# Patient Record
Sex: MALE | Race: WHITE | Employment: UNEMPLOYED | URBAN - METROPOLITAN AREA
[De-identification: names, ages, dates, MRNs, and addresses within clinical notes are randomized per-mention and may not be internally consistent; named-entity substitution may affect disease eponyms.]

---

## 2017-02-16 RX ORDER — METOPROLOL TARTRATE 50 MG/1
TABLET, FILM COATED ORAL
Qty: 180 TABLET | Refills: 0 | Status: SHIPPED | OUTPATIENT
Start: 2017-02-16 | End: 2017-07-10

## 2017-07-11 RX ORDER — METOPROLOL TARTRATE 50 MG/1
TABLET, FILM COATED ORAL
Qty: 180 TABLET | Refills: 0 | Status: SHIPPED | OUTPATIENT
Start: 2017-07-11 | End: 2017-10-30

## 2017-10-30 ENCOUNTER — OFFICE VISIT (OUTPATIENT)
Dept: INTERNAL MEDICINE CLINIC | Facility: CLINIC | Age: 57
End: 2017-10-30

## 2017-10-30 VITALS
SYSTOLIC BLOOD PRESSURE: 172 MMHG | HEART RATE: 80 BPM | OXYGEN SATURATION: 97 % | TEMPERATURE: 99 F | BODY MASS INDEX: 33.27 KG/M2 | HEIGHT: 66 IN | DIASTOLIC BLOOD PRESSURE: 100 MMHG | WEIGHT: 207 LBS | RESPIRATION RATE: 20 BRPM

## 2017-10-30 DIAGNOSIS — R05.9 COUGH: Primary | ICD-10-CM

## 2017-10-30 PROCEDURE — 99212 OFFICE O/P EST SF 10 MIN: CPT | Performed by: INTERNAL MEDICINE

## 2017-10-30 RX ORDER — QUETIAPINE 25 MG/1
25 TABLET, FILM COATED ORAL 2 TIMES DAILY
COMMUNITY
End: 2021-02-10

## 2017-10-30 RX ORDER — SULFAMETHOXAZOLE AND TRIMETHOPRIM 800; 160 MG/1; MG/1
1 TABLET ORAL 2 TIMES DAILY
Qty: 20 TABLET | Refills: 0 | Status: SHIPPED | OUTPATIENT
Start: 2017-10-30 | End: 2017-11-09

## 2017-10-30 RX ORDER — METOPROLOL TARTRATE 100 MG/1
100 TABLET ORAL 2 TIMES DAILY
Qty: 60 TABLET | Refills: 1 | Status: SHIPPED | OUTPATIENT
Start: 2017-10-30 | End: 2017-12-29

## 2017-10-30 RX ORDER — CODEINE PHOSPHATE AND GUAIFENESIN 10; 100 MG/5ML; MG/5ML
5 SOLUTION ORAL EVERY 6 HOURS PRN
Qty: 240 ML | Refills: 0 | Status: SHIPPED | OUTPATIENT
Start: 2017-10-30 | End: 2017-11-09

## 2017-10-30 NOTE — PROGRESS NOTES
Tracy Roach  10/22/1960 is a 65year old male who presents for upper respiratory symptoms    Patient presents with:  URI        HPI:   Pt reports  respiratory symptoms for  Few days   .        Current Outpatient Prescriptions:  QUEtiapine Fumarate auscultation. air entry equal.no chest wall tenderness. wheeze trace  CARDIO: RRR without murmur  GI: good BS's,no masses, HSM or tenderness    ASSESSMENT AND PLAN:   Rubi Smart was seen today for uri.     Diagnoses and all orders for this visit:    Cough  -

## 2018-02-14 RX ORDER — METOPROLOL TARTRATE 100 MG/1
TABLET ORAL
Qty: 60 TABLET | Refills: 1 | Status: SHIPPED | OUTPATIENT
Start: 2018-02-14 | End: 2018-06-18

## 2018-06-20 RX ORDER — METOPROLOL TARTRATE 100 MG/1
TABLET ORAL
Qty: 60 TABLET | Refills: 0 | Status: SHIPPED | OUTPATIENT
Start: 2018-06-20 | End: 2018-06-25

## 2018-06-25 ENCOUNTER — OFFICE VISIT (OUTPATIENT)
Dept: INTERNAL MEDICINE CLINIC | Facility: CLINIC | Age: 58
End: 2018-06-25

## 2018-06-25 VITALS
RESPIRATION RATE: 16 BRPM | SYSTOLIC BLOOD PRESSURE: 140 MMHG | WEIGHT: 246 LBS | TEMPERATURE: 98 F | BODY MASS INDEX: 40 KG/M2 | HEART RATE: 118 BPM | OXYGEN SATURATION: 97 % | DIASTOLIC BLOOD PRESSURE: 90 MMHG

## 2018-06-25 DIAGNOSIS — I10 ESSENTIAL HYPERTENSION, BENIGN: Primary | ICD-10-CM

## 2018-06-25 DIAGNOSIS — Z00.00 LABORATORY EXAMINATION ORDERED AS PART OF A ROUTINE GENERAL MEDICAL EXAMINATION: ICD-10-CM

## 2018-06-25 PROCEDURE — 99213 OFFICE O/P EST LOW 20 MIN: CPT | Performed by: INTERNAL MEDICINE

## 2018-06-25 RX ORDER — METOPROLOL TARTRATE 100 MG/1
TABLET ORAL
Qty: 180 TABLET | Refills: 0 | Status: SHIPPED | OUTPATIENT
Start: 2018-06-25 | End: 2019-02-19

## 2018-06-25 NOTE — PROGRESS NOTES
Angelia Smith  10/22/1960 is a 65year old male. Patient presents with:   Follow - Up       HPI:   BP meds for 2 weeks could not get a refill    Current Outpatient Prescriptions:  Metoprolol Tartrate 100 MG Oral Tab TAKE 1 TABLET BY MOUTH TWO TIMES up.    Diagnoses and all orders for this visit:    Essential hypertension, benign  -     EKG 12-LEAD;  Future  -     URINALYSIS, ROUTINE; Future    Laboratory examination ordered as part of a routine general medical examination  -     Metoprolol Tartrate 10

## 2019-02-19 ENCOUNTER — TELEPHONE (OUTPATIENT)
Dept: INTERNAL MEDICINE CLINIC | Facility: CLINIC | Age: 59
End: 2019-02-19

## 2019-02-19 DIAGNOSIS — Z00.00 LABORATORY EXAMINATION ORDERED AS PART OF A ROUTINE GENERAL MEDICAL EXAMINATION: ICD-10-CM

## 2019-02-19 RX ORDER — METOPROLOL TARTRATE 100 MG/1
TABLET ORAL
Qty: 60 TABLET | Refills: 0 | Status: SHIPPED | OUTPATIENT
Start: 2019-02-19 | End: 2019-02-26 | Stop reason: ALTCHOICE

## 2019-02-19 NOTE — TELEPHONE ENCOUNTER
Pt stated he has been taking it daily and that is why he just now needs a refill. Pt notified he would need to get labs done as well as schedule cpx since he is overdue.  Pt verbalized understanding and stated that he would have labs done this coming Monday

## 2019-02-19 NOTE — TELEPHONE ENCOUNTER
See refill for additional documentation. Pt originally called requesting order for EKG as he was instructed to have it done in 6/2018. Notified pt that EKG is already placed and he could have it done whenever he was able.  Pt stated that he was going to

## 2019-02-19 NOTE — TELEPHONE ENCOUNTER
Medication(s) to Refill:   Requested Prescriptions     Pending Prescriptions Disp Refills   • Metoprolol Tartrate 100 MG Oral Tab 180 tablet 0     Sig: TAKE 1 TABLET BY MOUTH TWO TIMES DAILY       Last Time Medication was Filled:  6/25/18      Last Offic

## 2019-02-25 ENCOUNTER — HOSPITAL (OUTPATIENT)
Dept: OTHER | Age: 59
End: 2019-02-25
Attending: INTERNAL MEDICINE

## 2019-02-25 ENCOUNTER — DIAGNOSTIC TRANS (OUTPATIENT)
Dept: OTHER | Age: 59
End: 2019-02-25

## 2019-02-25 LAB
AMB EXT CHOL/HDL RATIO: 5.7
AMB EXT CHOLESTEROL, TOTAL: 189 MG/DL
AMB EXT HDL CHOLESTEROL: 33 MG/DL
AMB EXT LDL CHOLESTEROL, DIRECT: 98 MG/DL
AMB EXT NON HDL CHOL: 156 MG/DL
AMB EXT TRIGLYCERIDES: 292 MG/DL

## 2019-02-26 ENCOUNTER — OFFICE VISIT (OUTPATIENT)
Dept: INTERNAL MEDICINE CLINIC | Facility: CLINIC | Age: 59
End: 2019-02-26

## 2019-02-26 VITALS
DIASTOLIC BLOOD PRESSURE: 106 MMHG | WEIGHT: 224.5 LBS | SYSTOLIC BLOOD PRESSURE: 180 MMHG | BODY MASS INDEX: 36.08 KG/M2 | OXYGEN SATURATION: 97 % | TEMPERATURE: 98 F | HEIGHT: 66 IN | RESPIRATION RATE: 18 BRPM | HEART RATE: 68 BPM

## 2019-02-26 DIAGNOSIS — E78.1 PURE HYPERGLYCERIDEMIA: ICD-10-CM

## 2019-02-26 DIAGNOSIS — R79.89 ELEVATED SERUM CREATININE: ICD-10-CM

## 2019-02-26 DIAGNOSIS — I10 ESSENTIAL HYPERTENSION, BENIGN: Primary | Chronic | ICD-10-CM

## 2019-02-26 PROCEDURE — 99212 OFFICE O/P EST SF 10 MIN: CPT | Performed by: INTERNAL MEDICINE

## 2019-02-26 RX ORDER — METOPROLOL SUCCINATE 200 MG/1
200 TABLET, EXTENDED RELEASE ORAL DAILY
Qty: 30 TABLET | Refills: 3 | Status: SHIPPED | OUTPATIENT
Start: 2019-02-26 | End: 2019-06-28

## 2019-02-26 RX ORDER — LOSARTAN POTASSIUM AND HYDROCHLOROTHIAZIDE 25; 100 MG/1; MG/1
1 TABLET ORAL DAILY
Qty: 90 TABLET | Refills: 3 | Status: SHIPPED | OUTPATIENT
Start: 2019-02-26 | End: 2021-07-22 | Stop reason: ALTCHOICE

## 2019-02-26 NOTE — PATIENT INSTRUCTIONS
Patient wants limited care and because of financial constraints  Does not want any additional workup

## 2019-02-26 NOTE — PROGRESS NOTES
Mor Moss  10/22/1960 is a 63year old male. Patient presents with: Follow - Up       HPI:   Here  just for follow-up blood pressure. Cannot afford a complete physical since he is self-pay.   Had EKG done at [facility] will send that over    Cu .   Heart sounds: normal S1S2. Murmurs: none. Rhythm: regular. LUNGS:   Airflow: normal air movement. Auscultation: no wheezing/rhonchi/rales.    Breath sounds bilaterally: symmetrical.       ASSESSMENT AND PLAN:   Edith Bales was seen today for follow -

## 2019-06-28 DIAGNOSIS — I10 ESSENTIAL HYPERTENSION, BENIGN: Chronic | ICD-10-CM

## 2019-06-29 RX ORDER — METOPROLOL SUCCINATE 200 MG/1
TABLET, EXTENDED RELEASE ORAL
Qty: 30 TABLET | Refills: 2 | Status: SHIPPED | OUTPATIENT
Start: 2019-06-29 | End: 2019-09-25

## 2019-06-29 NOTE — TELEPHONE ENCOUNTER
Last OV: 2/26/19 with Dr. Crispin Pascual  Last refill date: 2/26/19   #/refills: #30, 3 refills  When pt was asked to return for OV: 4 weeks  Upcoming appt: no upcoming appt  Last labs 2/25/19

## 2019-09-25 DIAGNOSIS — I10 ESSENTIAL HYPERTENSION, BENIGN: Chronic | ICD-10-CM

## 2019-09-26 NOTE — TELEPHONE ENCOUNTER
Protocol failed - labs and appointment needed - detailed message left for pt to contact office to schedule CPX.      Requesting METOPROLOL SUCCINATE  MG Oral Tablet 24 Hr  LOV: 2/26/19  RTC: 4 weeks  Last Relevant Labs: N/A  Filled: 6/29/19 #30 with 2

## 2019-09-30 RX ORDER — METOPROLOL SUCCINATE 200 MG/1
TABLET, EXTENDED RELEASE ORAL
Qty: 30 TABLET | Refills: 1 | Status: SHIPPED | OUTPATIENT
Start: 2019-09-30 | End: 2019-12-13

## 2019-12-13 DIAGNOSIS — I10 ESSENTIAL HYPERTENSION, BENIGN: Chronic | ICD-10-CM

## 2019-12-13 DIAGNOSIS — Z00.00 LABORATORY EXAMINATION ORDERED AS PART OF A ROUTINE GENERAL MEDICAL EXAMINATION: Primary | ICD-10-CM

## 2019-12-16 RX ORDER — METOPROLOL SUCCINATE 200 MG/1
TABLET, EXTENDED RELEASE ORAL
Qty: 30 TABLET | Refills: 0 | Status: SHIPPED | OUTPATIENT
Start: 2019-12-16 | End: 2020-01-13

## 2019-12-16 NOTE — TELEPHONE ENCOUNTER
Metoprolol er 200 mg 1 tab daily filled 9/30/19 30 with 1 refill     LOV 2/26/19  Return in about 4 weeks (around 3/26/2019  No upcoming apt on file   Labs no labs on file     Apt made for 1/2/20 advised pt to get labs done prior to office visit.

## 2020-01-02 ENCOUNTER — TELEPHONE (OUTPATIENT)
Dept: INTERNAL MEDICINE CLINIC | Facility: CLINIC | Age: 60
End: 2020-01-02

## 2020-01-02 ENCOUNTER — OFFICE VISIT (OUTPATIENT)
Dept: INTERNAL MEDICINE CLINIC | Facility: CLINIC | Age: 60
End: 2020-01-02

## 2020-01-02 VITALS
SYSTOLIC BLOOD PRESSURE: 110 MMHG | TEMPERATURE: 98 F | HEART RATE: 88 BPM | HEIGHT: 65.75 IN | OXYGEN SATURATION: 97 % | RESPIRATION RATE: 16 BRPM | DIASTOLIC BLOOD PRESSURE: 70 MMHG | WEIGHT: 223 LBS | BODY MASS INDEX: 36.27 KG/M2

## 2020-01-02 DIAGNOSIS — I10 ESSENTIAL HYPERTENSION, BENIGN: Primary | Chronic | ICD-10-CM

## 2020-01-02 LAB
A/G RATIO: 1.3
ALBUMIN: 4.3 G/DL
ALKALINE PHOSPHATASE: 53
ALT: 35
ANION GAP: 12
AST: 24
BILIRUBIN, TOTAL: 0.8 MG/DL
BUN/CREATININE RATIO: 11
BUN: 17
CALCIUM: 10.2
CARBON DIOXIDE: 29
CHLORIDE: 105
CREATININE: 1.52 MG/DL
GFR AFRICAN AMERICAN: 58
GFR NON-AFRICAN AMERICAN: 50
GLOBULIN: 3.3
GLUCOSE: 92
POTASSIUM: 3.9
PROTEIN: 7.6
SODIUM: 142

## 2020-01-02 PROCEDURE — 99212 OFFICE O/P EST SF 10 MIN: CPT | Performed by: INTERNAL MEDICINE

## 2020-01-02 RX ORDER — LOSARTAN POTASSIUM 100 MG/1
100 TABLET ORAL DAILY
Qty: 90 TABLET | Refills: 3 | Status: SHIPPED | OUTPATIENT
Start: 2020-01-02 | End: 2020-04-01

## 2020-01-02 NOTE — PROGRESS NOTES
Dave Farr  10/22/1960 is a 63year old male. Patient presents with:  Medication Follow-Up       HPI:   For med refill. Cannot afford any diagnostic testing.   House is currently in foreclosure  Current Outpatient Medications   Medication Sig D none.   Rhythm: regular. LUNGS:   Airflow: normal air movement. Auscultation: no wheezing/rhonchi/rales. Breath sounds bilaterally: symmetrical.       ASSESSMENT AND PLAN:   Eugene Menchaca was seen today for medication follow-up.     Diagnoses and all orders f

## 2020-01-08 ENCOUNTER — TELEPHONE (OUTPATIENT)
Dept: INTERNAL MEDICINE CLINIC | Facility: CLINIC | Age: 60
End: 2020-01-08

## 2020-01-08 DIAGNOSIS — I10 ESSENTIAL HYPERTENSION, BENIGN: Chronic | ICD-10-CM

## 2020-01-08 NOTE — TELEPHONE ENCOUNTER
Spoke with pharmacist Zaheer Sharma and notified her no end date is supposed to be on there. She verbalized understanding.

## 2020-01-08 NOTE — TELEPHONE ENCOUNTER
Manisha Brown calling to clarify directions for losartan 100 MG Oral Tab. 90 tablets dispensed with 3 fills however End date 04/01/2020. Please call back to clarify end date.   [phone]

## 2020-01-12 DIAGNOSIS — I10 ESSENTIAL HYPERTENSION, BENIGN: Chronic | ICD-10-CM

## 2020-01-13 RX ORDER — METOPROLOL SUCCINATE 200 MG/1
TABLET, EXTENDED RELEASE ORAL
Qty: 30 TABLET | Refills: 0 | Status: SHIPPED | OUTPATIENT
Start: 2020-01-13 | End: 2020-02-11

## 2020-01-13 NOTE — TELEPHONE ENCOUNTER
Passed protocol    Requesting METOPROLOL SUCCINATE  MG Oral Tablet 24 Hr  LOV: 1/2/2020  RTC: 6 months  Last Relevant Labs: NA  Filled: 12/16/19 #30 with 0 refills    No future appointments.

## 2020-02-10 DIAGNOSIS — I10 ESSENTIAL HYPERTENSION, BENIGN: Chronic | ICD-10-CM

## 2020-02-11 RX ORDER — METOPROLOL SUCCINATE 200 MG/1
TABLET, EXTENDED RELEASE ORAL
Qty: 30 TABLET | Refills: 0 | Status: SHIPPED | OUTPATIENT
Start: 2020-02-11 | End: 2020-03-11

## 2020-02-11 NOTE — TELEPHONE ENCOUNTER
Passed protocol    Requesting METOPROLOL SUCCINATE  MG Oral Tablet 24 Hr  LOV: 1/2/2020  RTC: 6 months  Last Relevant Labs: 2/25/19  Filled: 1/13/2020 #30 with 0 refills    No future appointments.

## 2020-03-10 DIAGNOSIS — I10 ESSENTIAL HYPERTENSION, BENIGN: Chronic | ICD-10-CM

## 2020-03-11 RX ORDER — METOPROLOL SUCCINATE 200 MG/1
TABLET, EXTENDED RELEASE ORAL
Qty: 30 TABLET | Refills: 5 | Status: SHIPPED | OUTPATIENT
Start: 2020-03-11 | End: 2020-09-11

## 2020-03-11 NOTE — TELEPHONE ENCOUNTER
Last OV: 1/2/2020 with Dr. Tamra Moody  Last refill date: 2/11/2020     #/refills: #30, 0 refills  When pt was asked to return for OV: 6 months  Upcoming appt/reason: no upcoming appt  Last labs 2/25/19

## 2020-09-10 DIAGNOSIS — I10 ESSENTIAL HYPERTENSION, BENIGN: Chronic | ICD-10-CM

## 2020-09-11 RX ORDER — METOPROLOL SUCCINATE 200 MG/1
TABLET, EXTENDED RELEASE ORAL
Qty: 30 TABLET | Refills: 0 | Status: SHIPPED | OUTPATIENT
Start: 2020-09-11 | End: 2020-10-15

## 2020-10-08 DIAGNOSIS — I10 ESSENTIAL HYPERTENSION, BENIGN: Chronic | ICD-10-CM

## 2020-10-09 NOTE — TELEPHONE ENCOUNTER
Labs and appointment needed - LVM for pt to contact office - pt due for annual physical. Please call and schedule     Requesting METOPROLOL SUCCINATE  MG Oral Tablet 24 Hr  LOV: 1/2/20  RTC: 6 months  Last Relevant Labs: 2/25/19  Filled: 9/11/20 #30

## 2020-10-15 RX ORDER — METOPROLOL SUCCINATE 200 MG/1
TABLET, EXTENDED RELEASE ORAL
Qty: 30 TABLET | Refills: 0 | Status: SHIPPED | OUTPATIENT
Start: 2020-10-15 | End: 2020-11-09

## 2020-10-15 NOTE — TELEPHONE ENCOUNTER
Medication(s) to Refill:   Requested Prescriptions     Pending Prescriptions Disp Refills   • METOPROLOL SUCCINATE  MG Oral Tablet 24 Hr [Pharmacy Med Name: Metoprolol Succinate ER Oral Tablet Extended Release 24 Hour 200 MG] 30 tablet 0     Sig: JUSTYNA

## 2020-11-09 ENCOUNTER — OFFICE VISIT (OUTPATIENT)
Dept: INTERNAL MEDICINE CLINIC | Facility: CLINIC | Age: 60
End: 2020-11-09

## 2020-11-09 VITALS
SYSTOLIC BLOOD PRESSURE: 124 MMHG | RESPIRATION RATE: 20 BRPM | DIASTOLIC BLOOD PRESSURE: 88 MMHG | OXYGEN SATURATION: 97 % | BODY MASS INDEX: 38.74 KG/M2 | TEMPERATURE: 98 F | WEIGHT: 238.19 LBS | HEIGHT: 65.75 IN | HEART RATE: 79 BPM

## 2020-11-09 DIAGNOSIS — I10 ESSENTIAL HYPERTENSION, BENIGN: Primary | Chronic | ICD-10-CM

## 2020-11-09 DIAGNOSIS — E78.1 PURE HYPERGLYCERIDEMIA: ICD-10-CM

## 2020-11-09 PROCEDURE — 3008F BODY MASS INDEX DOCD: CPT | Performed by: INTERNAL MEDICINE

## 2020-11-09 PROCEDURE — 99212 OFFICE O/P EST SF 10 MIN: CPT | Performed by: INTERNAL MEDICINE

## 2020-11-09 PROCEDURE — 3079F DIAST BP 80-89 MM HG: CPT | Performed by: INTERNAL MEDICINE

## 2020-11-09 PROCEDURE — 3074F SYST BP LT 130 MM HG: CPT | Performed by: INTERNAL MEDICINE

## 2020-11-09 RX ORDER — METOPROLOL SUCCINATE 200 MG/1
200 TABLET, EXTENDED RELEASE ORAL DAILY
Qty: 90 TABLET | Refills: 1 | Status: SHIPPED | OUTPATIENT
Start: 2020-11-09 | End: 2021-02-11

## 2020-11-09 NOTE — PROGRESS NOTES
Neeru Morse  10/22/1960 is a 62year old male.     Patient presents with:  Hypertension  Patient here for BP check cannot afford to go to a physical cannot go through any blood work just wants a refill on his medicines     HPI:   Patient refused a c none.   Rhythm: regular. LUNGS:   Airflow: normal air movement. Auscultation: no wheezing/rhonchi/rales. Breath sounds bilaterally: symmetrical.       ASSESSMENT AND PLAN:   Irlanda Jo was seen today for hypertension.     Diagnoses and all orders for this

## 2020-11-09 NOTE — PATIENT INSTRUCTIONS
Patient will try to get the blood work done at a place that is affordable  Commendations once lab work is available

## 2020-12-26 ENCOUNTER — TELEPHONE (OUTPATIENT)
Dept: INTERNAL MEDICINE CLINIC | Facility: CLINIC | Age: 60
End: 2020-12-26

## 2020-12-28 NOTE — TELEPHONE ENCOUNTER
Future Appointments   Date Time Provider Chris aHrvey   12/29/2020  3:45 PM Akil Jackson MD [facility] [place]     Vm are you ok with waiting to see pt tomorrow? Per Bev, records from [facility] have been received.

## 2020-12-29 ENCOUNTER — OFFICE VISIT (OUTPATIENT)
Dept: INTERNAL MEDICINE CLINIC | Facility: CLINIC | Age: 60
End: 2020-12-29
Payer: COMMERCIAL

## 2020-12-29 VITALS
RESPIRATION RATE: 18 BRPM | DIASTOLIC BLOOD PRESSURE: 72 MMHG | OXYGEN SATURATION: 99 % | TEMPERATURE: 98 F | SYSTOLIC BLOOD PRESSURE: 90 MMHG | HEART RATE: 80 BPM

## 2020-12-29 DIAGNOSIS — C41.9 METASTATIC BONE CANCER (HCC): ICD-10-CM

## 2020-12-29 DIAGNOSIS — R91.8 HILAR MASS: Primary | ICD-10-CM

## 2020-12-29 DIAGNOSIS — Z00.00 LABORATORY EXAMINATION ORDERED AS PART OF A ROUTINE GENERAL MEDICAL EXAMINATION: ICD-10-CM

## 2020-12-29 PROCEDURE — 99213 OFFICE O/P EST LOW 20 MIN: CPT | Performed by: INTERNAL MEDICINE

## 2020-12-29 PROCEDURE — 3078F DIAST BP <80 MM HG: CPT | Performed by: INTERNAL MEDICINE

## 2020-12-29 PROCEDURE — 3074F SYST BP LT 130 MM HG: CPT | Performed by: INTERNAL MEDICINE

## 2020-12-29 RX ORDER — LISINOPRIL 10 MG/1
1 TABLET ORAL DAILY
COMMUNITY
End: 2021-01-15 | Stop reason: ALTCHOICE

## 2020-12-29 RX ORDER — HYDROCODONE BITARTRATE AND ACETAMINOPHEN 5; 325 MG/1; MG/1
1 TABLET ORAL EVERY 6 HOURS PRN
Qty: 50 TABLET | Refills: 0 | Status: SHIPPED | OUTPATIENT
Start: 2020-12-29 | End: 2021-01-18

## 2020-12-29 NOTE — PROGRESS NOTES
Keyana Daugherty  10/22/1960 is a 62year old male. Patient presents with:  ER F/U       HPI:   Patient and to [facility] for low back pain and hemoptysis.   Had a chest x-ray done which showed the patient to have a left hilar mass CT of the lumba (36.4 °C) (Oral)   Resp 18   SpO2 99%   HEENT:   jvp not raised. Ear canals: normal.   Ear drums: normal .   Ears: unremarkable. Mouth: unremarkable. Nasal septum: midline. Pharynx: normal.   Sinuses: non-tender.    HEART:   Clicks: no.   Distal Pul

## 2021-01-08 ENCOUNTER — TELEPHONE (OUTPATIENT)
Dept: HEMATOLOGY/ONCOLOGY | Facility: HOSPITAL | Age: 61
End: 2021-01-08

## 2021-01-08 PROBLEM — R91.8 LUNG MASS: Status: ACTIVE | Noted: 2021-01-08

## 2021-01-08 PROBLEM — M54.50 ACUTE MIDLINE LOW BACK PAIN, UNSPECIFIED WHETHER SCIATICA PRESENT: Status: ACTIVE | Noted: 2021-01-08

## 2021-01-08 PROBLEM — D49.2 LUMBAR SPINE TUMOR: Status: ACTIVE | Noted: 2021-01-08

## 2021-01-08 PROBLEM — J44.9 CHRONIC OBSTRUCTIVE PULMONARY DISEASE, UNSPECIFIED COPD TYPE (HCC): Status: ACTIVE | Noted: 2021-01-08

## 2021-01-08 NOTE — TELEPHONE ENCOUNTER
Spoke to patient. Got message from Dr Belinda Feng today. Had appt ont eh 21st, but needs to see me sooner - will squeeze him in at noon on Tuesday the 12th. He needs to go to [facility] and get all his imaging on disc and bring it with him.   He i

## 2021-01-11 ENCOUNTER — SOCIAL WORK SERVICES (OUTPATIENT)
Dept: HEMATOLOGY/ONCOLOGY | Facility: HOSPITAL | Age: 61
End: 2021-01-11

## 2021-01-11 ENCOUNTER — TELEPHONE (OUTPATIENT)
Dept: HEMATOLOGY/ONCOLOGY | Facility: HOSPITAL | Age: 61
End: 2021-01-11

## 2021-01-11 NOTE — PROGRESS NOTES
was transferred call as Patient does not have Cancer Care coverage under his Insurance.   educated on [employer], advised Patient to contact his Insurance to see if there is any expansion coverage they could get, and educate

## 2021-01-11 NOTE — TELEPHONE ENCOUNTER
Spoke with pt had to cx appt due to pts ins does not have any cancer treatment care on policy. I transferred patient to [facility] to assist with financial assistance. Spoke with Marshall Carpenter at [facility] to confirm policy ref# Marshall Carpenter [id number] spencer.

## 2021-01-12 ENCOUNTER — APPOINTMENT (OUTPATIENT)
Dept: HEMATOLOGY/ONCOLOGY | Facility: HOSPITAL | Age: 61
End: 2021-01-12
Attending: INTERNAL MEDICINE
Payer: COMMERCIAL

## 2021-01-12 NOTE — TELEPHONE ENCOUNTER
On 1/11 spoke with the patient and explained to him that his ins policy has minimal coverage and does not cover for cancer care.  Taiwo Huang than called his ins company to check and Leonides Law at [facility] explained to him that he is only covered under a multiplan with

## 2021-01-14 DIAGNOSIS — R91.8 HILAR MASS: ICD-10-CM

## 2021-01-14 DIAGNOSIS — C41.9 METASTATIC BONE CANCER (HCC): ICD-10-CM

## 2021-01-14 NOTE — TELEPHONE ENCOUNTER
Failed protocol - labs needed - Labs ordered.      Requesting losartan 100 MG Oral Tab   LOV: 11/9/20  RTC: 3 months  Last Relevant Labs: 3/6/19  Filled: 1/2/20 #90 with 03 refills    Future Appointments   Date Time Provider Chris Harvey   1/20/2021  9

## 2021-01-15 NOTE — TELEPHONE ENCOUNTER
Spoke with pt and was able to clean up his med list - while speaking with him he also mentioned that he needs a refill of the norco.     Orders pended.

## 2021-01-18 RX ORDER — HYDROCODONE BITARTRATE AND ACETAMINOPHEN 5; 325 MG/1; MG/1
1 TABLET ORAL EVERY 6 HOURS PRN
Qty: 50 TABLET | Refills: 0 | Status: SHIPPED | OUTPATIENT
Start: 2021-01-18 | End: 2021-02-18

## 2021-01-18 RX ORDER — LOSARTAN POTASSIUM 100 MG/1
TABLET ORAL
Qty: 90 TABLET | Refills: 0 | Status: SHIPPED | OUTPATIENT
Start: 2021-01-18 | End: 2021-02-18 | Stop reason: DRUGHIGH

## 2021-01-20 ENCOUNTER — OFFICE VISIT (OUTPATIENT)
Dept: PAIN CLINIC | Facility: CLINIC | Age: 61
End: 2021-01-20
Payer: COMMERCIAL

## 2021-01-20 VITALS
BODY MASS INDEX: 32.14 KG/M2 | SYSTOLIC BLOOD PRESSURE: 108 MMHG | OXYGEN SATURATION: 98 % | HEART RATE: 78 BPM | DIASTOLIC BLOOD PRESSURE: 62 MMHG | HEIGHT: 66 IN | WEIGHT: 200 LBS | RESPIRATION RATE: 16 BRPM

## 2021-01-20 DIAGNOSIS — D49.2 LUMBAR SPINE TUMOR: ICD-10-CM

## 2021-01-20 DIAGNOSIS — M84.48XA PATHOLOGICAL FRACTURE OF LUMBAR VERTEBRA, INITIAL ENCOUNTER: Primary | ICD-10-CM

## 2021-01-20 DIAGNOSIS — R91.8 LUNG MASS: ICD-10-CM

## 2021-01-20 DIAGNOSIS — M54.50 ACUTE MIDLINE LOW BACK PAIN, UNSPECIFIED WHETHER SCIATICA PRESENT: ICD-10-CM

## 2021-01-20 PROCEDURE — 3008F BODY MASS INDEX DOCD: CPT | Performed by: ANESTHESIOLOGY

## 2021-01-20 PROCEDURE — 3078F DIAST BP <80 MM HG: CPT | Performed by: ANESTHESIOLOGY

## 2021-01-20 PROCEDURE — 3074F SYST BP LT 130 MM HG: CPT | Performed by: ANESTHESIOLOGY

## 2021-01-20 PROCEDURE — 99204 OFFICE O/P NEW MOD 45 MIN: CPT | Performed by: ANESTHESIOLOGY

## 2021-01-20 RX ORDER — GABAPENTIN 300 MG/1
300 CAPSULE ORAL 3 TIMES DAILY
Qty: 90 CAPSULE | Refills: 0 | Status: SHIPPED | OUTPATIENT
Start: 2021-01-20 | End: 2021-02-18

## 2021-01-20 NOTE — H&P
Name: Nicolas Dailey   : 10/22/1960   DOS: 2021     Chief complaint: Low back pain    History of present illness:  Nicolas Dailey is a 62year old male who presents today for evaluation of bilateral low back pain with aching and throbbing down Grandparents,Family history of CAD    • Other (Blood Clots) Other         Aunt, Family history of problem with boold clots      Social History    Tobacco Use      Smoking status: Current Every Day Smoker        Packs/day: 1.00        Years: 45.00 tumor  Acute midline low back pain, unspecified whether sciatica present. Plan:     The patient is a pleasant 64-year-old gentleman with recent finding of hilar mass with likely metastatic disease to the lumbar spine.   He is still pending further onco

## 2021-01-20 NOTE — PATIENT INSTRUCTIONS
Refill policies:    • Allow 2-3 business days for refills; controlled substances may take longer.   • Contact your pharmacy at least 5 days prior to running out of medication and have them send an electronic request or submit request through the “request re Depending on your insurance carrier, approval may take 3-10 days. It is highly recommended patients contact their insurance carrier directly to determine coverage.   If test is done without insurance authorization, patient may be responsible for the entire pharmacy at least 5 days prior to running out of medication and have them send an electronic request or submit request through the “request refill” option in your [facility] account.   Refills are not addressed on weekends; covering physicians do not authorize directly to determine coverage. If test is done without insurance authorization, patient may be responsible for the entire amount billed. Precertification and Prior Authorizations:   If your physician has recommended that you have a procedure or addit

## 2021-01-20 NOTE — PROGRESS NOTES
Location of Pain: both pelvic area and both legs    Date Pain Began: 12/20/2020          Work Related:   No        Receiving Work Comp/Disability:   No    Numeric Rating Scale:  Pain at Present:  4

## 2021-01-21 ENCOUNTER — APPOINTMENT (OUTPATIENT)
Dept: HEMATOLOGY/ONCOLOGY | Age: 61
End: 2021-01-21
Attending: INTERNAL MEDICINE
Payer: COMMERCIAL

## 2021-02-05 ENCOUNTER — TELEPHONE (OUTPATIENT)
Dept: INTERNAL MEDICINE CLINIC | Facility: CLINIC | Age: 61
End: 2021-02-05

## 2021-02-05 NOTE — TELEPHONE ENCOUNTER
Forms received for patient's FMLA. Mychart sent to patient, needs phone visit or in person visit to complete these forms.     Forms my dr. Dae Tinajero upcoming appts folder in pod # 2

## 2021-02-10 ENCOUNTER — NURSE ONLY (OUTPATIENT)
Dept: HEMATOLOGY/ONCOLOGY | Facility: HOSPITAL | Age: 61
End: 2021-02-10
Attending: INTERNAL MEDICINE
Payer: COMMERCIAL

## 2021-02-10 VITALS
DIASTOLIC BLOOD PRESSURE: 57 MMHG | HEIGHT: 66 IN | HEART RATE: 78 BPM | TEMPERATURE: 98 F | WEIGHT: 227 LBS | RESPIRATION RATE: 18 BRPM | OXYGEN SATURATION: 98 % | BODY MASS INDEX: 36.48 KG/M2 | SYSTOLIC BLOOD PRESSURE: 97 MMHG

## 2021-02-10 DIAGNOSIS — M54.10 RADICULAR PAIN OF BOTH LOWER EXTREMITIES: ICD-10-CM

## 2021-02-10 DIAGNOSIS — R91.8 LUNG MASS: Primary | ICD-10-CM

## 2021-02-10 DIAGNOSIS — M89.9 LYTIC BONE LESIONS ON XRAY: ICD-10-CM

## 2021-02-10 LAB
ALBUMIN SERPL-MCNC: 3.6 G/DL (ref 3.4–5)
ALBUMIN/GLOB SERPL: 0.9 {RATIO} (ref 1–2)
ALP LIVER SERPL-CCNC: 81 U/L
ALT SERPL-CCNC: 21 U/L
ANION GAP SERPL CALC-SCNC: 5 MMOL/L (ref 0–18)
AST SERPL-CCNC: 12 U/L (ref 15–37)
BASOPHILS # BLD AUTO: 0.06 X10(3) UL (ref 0–0.2)
BASOPHILS NFR BLD AUTO: 0.6 %
BILIRUB SERPL-MCNC: 0.3 MG/DL (ref 0.1–2)
BUN BLD-MCNC: 18 MG/DL (ref 7–18)
BUN/CREAT SERPL: 14 (ref 10–20)
CALCIUM BLD-MCNC: 9.7 MG/DL (ref 8.5–10.1)
CEA SERPL-MCNC: 12 NG/ML (ref ?–5)
CHLORIDE SERPL-SCNC: 104 MMOL/L (ref 98–112)
CO2 SERPL-SCNC: 30 MMOL/L (ref 21–32)
CREAT BLD-MCNC: 1.29 MG/DL
DEPRECATED RDW RBC AUTO: 42.7 FL (ref 35.1–46.3)
EOSINOPHIL # BLD AUTO: 0.18 X10(3) UL (ref 0–0.7)
EOSINOPHIL NFR BLD AUTO: 1.9 %
ERYTHROCYTE [DISTWIDTH] IN BLOOD BY AUTOMATED COUNT: 13.6 % (ref 11–15)
GLOBULIN PLAS-MCNC: 3.9 G/DL (ref 2.8–4.4)
GLUCOSE BLD-MCNC: 96 MG/DL (ref 70–99)
HCT VFR BLD AUTO: 42.8 %
HGB BLD-MCNC: 14.4 G/DL
IMM GRANULOCYTES # BLD AUTO: 0.1 X10(3) UL (ref 0–1)
IMM GRANULOCYTES NFR BLD: 1.1 %
LDH SERPL L TO P-CCNC: 152 U/L
LYMPHOCYTES # BLD AUTO: 2.26 X10(3) UL (ref 1–4)
LYMPHOCYTES NFR BLD AUTO: 24.1 %
M PROTEIN MFR SERPL ELPH: 7.5 G/DL (ref 6.4–8.2)
MCH RBC QN AUTO: 30.5 PG (ref 26–34)
MCHC RBC AUTO-ENTMCNC: 33.6 G/DL (ref 31–37)
MCV RBC AUTO: 90.7 FL
MONOCYTES # BLD AUTO: 0.77 X10(3) UL (ref 0.1–1)
MONOCYTES NFR BLD AUTO: 8.2 %
NEUTROPHILS # BLD AUTO: 6 X10 (3) UL (ref 1.5–7.7)
NEUTROPHILS # BLD AUTO: 6 X10(3) UL (ref 1.5–7.7)
NEUTROPHILS NFR BLD AUTO: 64.1 %
OSMOLALITY SERPL CALC.SUM OF ELEC: 290 MOSM/KG (ref 275–295)
PATIENT FASTING Y/N/NP: NO
PLATELET # BLD AUTO: 181 10(3)UL (ref 150–450)
POTASSIUM SERPL-SCNC: 3.9 MMOL/L (ref 3.5–5.1)
RBC # BLD AUTO: 4.72 X10(6)UL
SODIUM SERPL-SCNC: 139 MMOL/L (ref 136–145)
WBC # BLD AUTO: 9.4 X10(3) UL (ref 4–11)

## 2021-02-10 PROCEDURE — 36415 COLL VENOUS BLD VENIPUNCTURE: CPT

## 2021-02-10 PROCEDURE — 99205 OFFICE O/P NEW HI 60 MIN: CPT | Performed by: INTERNAL MEDICINE

## 2021-02-10 RX ORDER — MIRTAZAPINE 45 MG/1
45 TABLET, FILM COATED ORAL NIGHTLY
Qty: 90 TABLET | Refills: 3 | Status: SHIPPED | OUTPATIENT
Start: 2021-02-10 | End: 2021-06-07

## 2021-02-10 RX ORDER — QUETIAPINE 100 MG/1
25 TABLET, FILM COATED ORAL NIGHTLY
Qty: 90 TABLET | Refills: 3 | Status: SHIPPED | OUTPATIENT
Start: 2021-02-10 | End: 2021-02-18

## 2021-02-10 RX ORDER — HYDROCODONE BITARTRATE AND ACETAMINOPHEN 10; 325 MG/1; MG/1
1-2 TABLET ORAL EVERY 6 HOURS PRN
Qty: 120 TABLET | Refills: 0 | Status: SHIPPED | OUTPATIENT
Start: 2021-02-10 | End: 2021-02-22

## 2021-02-11 ENCOUNTER — TELEPHONE (OUTPATIENT)
Dept: HEMATOLOGY/ONCOLOGY | Facility: HOSPITAL | Age: 61
End: 2021-02-11

## 2021-02-11 ENCOUNTER — SOCIAL WORK SERVICES (OUTPATIENT)
Dept: HEMATOLOGY/ONCOLOGY | Facility: HOSPITAL | Age: 61
End: 2021-02-11

## 2021-02-11 DIAGNOSIS — I10 ESSENTIAL HYPERTENSION, BENIGN: Chronic | ICD-10-CM

## 2021-02-11 RX ORDER — METOPROLOL SUCCINATE 200 MG/1
200 TABLET, EXTENDED RELEASE ORAL DAILY
Qty: 90 TABLET | Refills: 0 | Status: ON HOLD | OUTPATIENT
Start: 2021-02-11 | End: 2021-03-06

## 2021-02-11 NOTE — TELEPHONE ENCOUNTER
Kenisha Cullen calling asking if Dr Dominic Javier will fill Metoprolol Succinate  MG Oral Tablet 24 as his new ins with previous doctor is out of network.  He also said that tl will only give him a 7 day supply on HYDROcodone-acetaminophen  MG Oral Tab a

## 2021-02-11 NOTE — TELEPHONE ENCOUNTER
Patient has not read nooked message. Please call patient to make appt for FMLA paperwork.  In office or video

## 2021-02-11 NOTE — CONSULTS
[facility]    PATIENT'S NAME: Natalya Bjorn PHYSICIAN: Nickie Estevez MD   PATIENT ACCOUNT #: [de-identified] LOCATION: [address] [place] [address] RECORD #: [id number] YOB: 1960   CONSULTATION DATE: 02/10/2021       CANCER determine the best biopsy target. He thought he would need an EBUS to reach the left hilar mass. He thought that it was most likely to be stage IV malignancy based upon the lytic lesion in the spine.   His biggest concern and biggest symptom have been juli ago, but then he reportedly recovered normally from this. He has a history of alcohol and tobacco abuse. He was a heavy drinker in the past but stopped drinking about 7 years ago, but he has gone back to drinking 4 beers per day to relieve the pain.   He He has not had a colonoscopy at any recent time. The remainder of a 10-point complete review of systems is unremarkable with pertinent positives and negatives in the HPI. PHYSICAL EXAMINATION:    GENERAL:  He is a relatively well-appearing male.   He small cell and non-small cell carcinoma, such as adenocarcinoma, squamous cell carcinoma, or large cell carcinoma. We talked about the role for molecular testing, including looking for targetable mutations and PD-L1 testing.   We also discussed the fact th

## 2021-02-11 NOTE — PROGRESS NOTES
called and spoke to Patient.  Patient stated that he “thinks he does not have a job anymore” as he had filled out paperwork for his leave, but does not have any benefits through his employer, and stated that he did not fully understand the exp

## 2021-02-11 NOTE — TELEPHONE ENCOUNTER
ANNABEL Walker  asked me to call Chris Campos about his refill request for Metoprolol Succinate. ANNABEL Walker has already sent a refill order to [facility]. It will be available for pickup tomorrow. Chris Campos said he wants the refill to go to [place] in [facility].  He wants nayeli

## 2021-02-11 NOTE — TELEPHONE ENCOUNTER
Spoke with patient. He is scheduled for the CT scan tomorrow and Bone Scan next Friday. Metoprolol refilled by ANNABEL Walker. Will await form from [place] for Hydrocodone dose limit.  Gave patient phone number to call to schedule an appointment with a PCP in [place]

## 2021-02-12 ENCOUNTER — HOSPITAL ENCOUNTER (OUTPATIENT)
Dept: CT IMAGING | Facility: HOSPITAL | Age: 61
Discharge: HOME | End: 2021-02-12
Attending: INTERNAL MEDICINE
Payer: COMMERCIAL

## 2021-02-12 DIAGNOSIS — R91.8 LUNG MASS: ICD-10-CM

## 2021-02-12 DIAGNOSIS — C78.7 LIVER METASTASES (HCC): ICD-10-CM

## 2021-02-12 DIAGNOSIS — M89.9 LYTIC BONE LESIONS ON XRAY: ICD-10-CM

## 2021-02-12 DIAGNOSIS — R91.8 MASS OF LEFT LUNG: Primary | ICD-10-CM

## 2021-02-12 PROCEDURE — 71260 CT THORAX DX C+: CPT | Performed by: INTERNAL MEDICINE

## 2021-02-12 PROCEDURE — 74177 CT ABD & PELVIS W/CONTRAST: CPT | Performed by: INTERNAL MEDICINE

## 2021-02-15 ENCOUNTER — LAB ENCOUNTER (OUTPATIENT)
Dept: LAB | Age: 61
End: 2021-02-15
Attending: INTERNAL MEDICINE
Payer: COMMERCIAL

## 2021-02-15 ENCOUNTER — TELEPHONE (OUTPATIENT)
Dept: HEMATOLOGY/ONCOLOGY | Facility: HOSPITAL | Age: 61
End: 2021-02-15

## 2021-02-15 DIAGNOSIS — R91.8 MASS OF LEFT LUNG: ICD-10-CM

## 2021-02-15 DIAGNOSIS — C78.7 LIVER METASTASES (HCC): ICD-10-CM

## 2021-02-15 LAB — SARS-COV-2 RNA RESP QL NAA+PROBE: NOT DETECTED

## 2021-02-15 NOTE — TELEPHONE ENCOUNTER
Patient was informed that he is schedule for covid test today at 2:45 and on 2/17 for biopsy of the liver at 12 pm. Address and locations given to the patient.  He agrees with the plan

## 2021-02-15 NOTE — PROGRESS NOTES
Nursing Consultation Note  Patient: Angelia Smith  YOB: 1960  Age: 62year old  Radiation Oncologist: Dr. Lanney Leyden  Referring Physician: Drea Junior, Dr. Lillian Brar, Dr. Pete Varghese, Dr. Nan Montalvo  Diagnosis: BONE METS  Consult Date: 2 for frequency. Musculoskeletal: Positive for back pain. Skin: Negative. Allergic/Immunologic: Negative. Neurological: Negative. Hematological: Negative. Psychiatric/Behavioral: Negative.            Allergies:    Pcn [Penicillins]           C Laterality Date   • HERNIA SURGERY  1990    Hernia repair    • TONSILLECTOMY      as a child       Social History    Socioeconomic History      Marital status: Single      Spouse name: Not on file      Number of children: 0      Years of education: Not on Asked         Service: Not Asked        Blood Transfusions: Not Asked        Occupational Exposure: Not Asked        Hobby Hazards: Not Asked        Sleep Concern: Not Asked        Back Care: Not Asked        Bike Helmet: Not Asked        Self-Exam

## 2021-02-16 ENCOUNTER — HOSPITAL ENCOUNTER (OUTPATIENT)
Dept: RADIATION ONCOLOGY | Facility: HOSPITAL | Age: 61
Discharge: HOME | End: 2021-02-16
Attending: RADIOLOGY
Payer: COMMERCIAL

## 2021-02-16 VITALS
BODY MASS INDEX: 36.77 KG/M2 | RESPIRATION RATE: 20 BRPM | HEIGHT: 65.98 IN | WEIGHT: 228.81 LBS | HEART RATE: 70 BPM | SYSTOLIC BLOOD PRESSURE: 120 MMHG | DIASTOLIC BLOOD PRESSURE: 82 MMHG | TEMPERATURE: 98 F | OXYGEN SATURATION: 96 %

## 2021-02-16 DIAGNOSIS — C79.51 SECONDARY MALIGNANT NEOPLASM OF BONE (HCC): Primary | ICD-10-CM

## 2021-02-16 PROCEDURE — 99214 OFFICE O/P EST MOD 30 MIN: CPT

## 2021-02-16 RX ORDER — SODIUM CHLORIDE 9 MG/ML
INJECTION, SOLUTION INTRAVENOUS CONTINUOUS
Status: CANCELLED | OUTPATIENT
Start: 2021-02-16

## 2021-02-16 RX ORDER — MIDAZOLAM HYDROCHLORIDE 1 MG/ML
1 INJECTION INTRAMUSCULAR; INTRAVENOUS EVERY 5 MIN PRN
Status: CANCELLED | OUTPATIENT
Start: 2021-02-17 | End: 2021-02-17

## 2021-02-16 RX ORDER — DEXAMETHASONE 4 MG/1
TABLET ORAL
Qty: 6 TABLET | Refills: 0 | Status: SHIPPED | OUTPATIENT
Start: 2021-02-16 | End: 2021-02-25 | Stop reason: ALTCHOICE

## 2021-02-16 RX ORDER — NALOXONE HYDROCHLORIDE 0.4 MG/ML
80 INJECTION, SOLUTION INTRAMUSCULAR; INTRAVENOUS; SUBCUTANEOUS AS NEEDED
Status: CANCELLED | OUTPATIENT
Start: 2021-02-16

## 2021-02-16 RX ORDER — MIDAZOLAM HYDROCHLORIDE 1 MG/ML
1 INJECTION INTRAMUSCULAR; INTRAVENOUS EVERY 5 MIN PRN
Status: CANCELLED | OUTPATIENT
Start: 2021-02-16

## 2021-02-16 RX ORDER — FLUMAZENIL 0.1 MG/ML
0.2 INJECTION, SOLUTION INTRAVENOUS AS NEEDED
Status: CANCELLED | OUTPATIENT
Start: 2021-02-16

## 2021-02-16 NOTE — PATIENT INSTRUCTIONS
- WE WILL CALL TO SCHEDULE YOUR CT SIMULATION/MAPPING SESSION FOR RADIATION      - IF YOU HAVE ANY QUESTIONS OR CONCERNS REGARDING RADIATION THERAPY, PLEASE CALL [phone]

## 2021-02-17 ENCOUNTER — HOSPITAL ENCOUNTER (OUTPATIENT)
Dept: RADIATION ONCOLOGY | Facility: HOSPITAL | Age: 61
Discharge: HOME | End: 2021-02-17
Attending: RADIOLOGY
Payer: COMMERCIAL

## 2021-02-17 ENCOUNTER — TELEPHONE (OUTPATIENT)
Dept: HEMATOLOGY/ONCOLOGY | Facility: HOSPITAL | Age: 61
End: 2021-02-17

## 2021-02-17 ENCOUNTER — HOSPITAL ENCOUNTER (OUTPATIENT)
Dept: ULTRASOUND IMAGING | Facility: HOSPITAL | Age: 61
Discharge: HOME | End: 2021-02-17
Attending: INTERNAL MEDICINE
Payer: COMMERCIAL

## 2021-02-17 ENCOUNTER — TELEPHONE (OUTPATIENT)
Dept: INTERNAL MEDICINE CLINIC | Facility: CLINIC | Age: 61
End: 2021-02-17

## 2021-02-17 ENCOUNTER — NURSE ONLY (OUTPATIENT)
Dept: LAB | Facility: HOSPITAL | Age: 61
End: 2021-02-17
Attending: INTERNAL MEDICINE
Payer: COMMERCIAL

## 2021-02-17 VITALS
RESPIRATION RATE: 14 BRPM | HEART RATE: 84 BPM | HEIGHT: 66 IN | WEIGHT: 220 LBS | TEMPERATURE: 97 F | BODY MASS INDEX: 35.36 KG/M2 | SYSTOLIC BLOOD PRESSURE: 90 MMHG | DIASTOLIC BLOOD PRESSURE: 72 MMHG | OXYGEN SATURATION: 97 %

## 2021-02-17 DIAGNOSIS — R91.8 MASS OF LEFT LUNG: ICD-10-CM

## 2021-02-17 DIAGNOSIS — C78.7 LIVER METASTASES (HCC): ICD-10-CM

## 2021-02-17 DIAGNOSIS — R91.8 MASS OF LEFT LUNG: Primary | ICD-10-CM

## 2021-02-17 LAB
INR BLD: 0.98 (ref 0.89–1.11)
PSA SERPL DL<=0.01 NG/ML-MCNC: 13.3 SECONDS (ref 12.4–14.6)

## 2021-02-17 PROCEDURE — 77470 SPECIAL RADIATION TREATMENT: CPT | Performed by: RADIOLOGY

## 2021-02-17 PROCEDURE — 77290 THER RAD SIMULAJ FIELD CPLX: CPT | Performed by: RADIOLOGY

## 2021-02-17 PROCEDURE — 36415 COLL VENOUS BLD VENIPUNCTURE: CPT

## 2021-02-17 PROCEDURE — 77334 RADIATION TREATMENT AID(S): CPT | Performed by: RADIOLOGY

## 2021-02-17 PROCEDURE — 85610 PROTHROMBIN TIME: CPT

## 2021-02-17 NOTE — CONSULTS
[facility] RADIATION [facility]COLOGY CONSULTATION     PATIENT:   Neeraj Drew MD:  Drea Junior MD      DIAGNOSIS:   Probable metastatic lung cancer       CC: Bone mets    HPI   69-year-old man here for consult.     He presented w rhythm  ABDOMEN: Soft and nontender  EXT:  No focal weakness    IMPRESSION:   52-year-old man with a large left lung mass, mediastinal adenopathy, multiple lytic bone lesions, left kidney mass, right liver mass    -Consistent with metastatic lung cancer an

## 2021-02-17 NOTE — TELEPHONE ENCOUNTER
Received call from Radiology that patient arrived for liver biopsy today, but due to hypotension, biopsy was cancelled. Patient's cardiologist has already been notified and will reach out to patient for further instructions. Dr. Robin Mccray notified.

## 2021-02-17 NOTE — IMAGING NOTE
Lynnette Kaye received at [facility]. For US Liver Biopsy. Patient's BP was low SBP 84-90. Dr Brandy Zabala was informed. Procedure was cancelled and needs to be rescheduled. Pt was informed of the plan.  Dr Michael Britton was also informed of low BP and may need

## 2021-02-17 NOTE — TELEPHONE ENCOUNTER
Nichelle with [facility] radiology called stating that pt presented today for liver bx. BP is 84/68 and per Preeti Madison, pt will not be able to have bx today. Pt did take all BP meds last [address] and per Preeti Madison pt is asymptomatic at this time.  Preeti Madison inquiring if pt needs to pro

## 2021-02-17 NOTE — IMAGING NOTE
Pt came in for Liver bx and  Has BP 84/68. Dr Petersen Parents notified as  Per his  Recommendation procedure cancelled and  reached out to Dr Julieta Huynh office to notify the vitals. Pt remain asymptomatic. Dr Julieta Huynh office will follow up with patient. Patient discharg

## 2021-02-18 ENCOUNTER — TELEPHONE (OUTPATIENT)
Dept: HEMATOLOGY/ONCOLOGY | Facility: HOSPITAL | Age: 61
End: 2021-02-18

## 2021-02-18 ENCOUNTER — OFFICE VISIT (OUTPATIENT)
Dept: INTERNAL MEDICINE CLINIC | Facility: CLINIC | Age: 61
End: 2021-02-18
Payer: COMMERCIAL

## 2021-02-18 VITALS
RESPIRATION RATE: 20 BRPM | TEMPERATURE: 99 F | WEIGHT: 231 LBS | OXYGEN SATURATION: 99 % | DIASTOLIC BLOOD PRESSURE: 68 MMHG | SYSTOLIC BLOOD PRESSURE: 96 MMHG | HEART RATE: 86 BPM | BODY MASS INDEX: 37.12 KG/M2 | HEIGHT: 66 IN

## 2021-02-18 DIAGNOSIS — I10 ESSENTIAL HYPERTENSION, BENIGN: Primary | ICD-10-CM

## 2021-02-18 PROCEDURE — 99213 OFFICE O/P EST LOW 20 MIN: CPT | Performed by: INTERNAL MEDICINE

## 2021-02-18 PROCEDURE — 3078F DIAST BP <80 MM HG: CPT | Performed by: INTERNAL MEDICINE

## 2021-02-18 PROCEDURE — 3008F BODY MASS INDEX DOCD: CPT | Performed by: INTERNAL MEDICINE

## 2021-02-18 PROCEDURE — 3074F SYST BP LT 130 MM HG: CPT | Performed by: INTERNAL MEDICINE

## 2021-02-18 RX ORDER — QUETIAPINE 100 MG/1
100 TABLET, FILM COATED ORAL NIGHTLY
Qty: 90 TABLET | Refills: 3 | Status: SHIPPED | OUTPATIENT
Start: 2021-02-18 | End: 2021-11-23

## 2021-02-18 RX ORDER — GABAPENTIN 300 MG/1
300 CAPSULE ORAL 3 TIMES DAILY
Qty: 90 CAPSULE | Refills: 0 | Status: ON HOLD | OUTPATIENT
Start: 2021-02-18 | End: 2021-03-04

## 2021-02-18 NOTE — PROGRESS NOTES
Angelia Smith [facility] 10/22/1960 is a 62year old male. Patient presents with:  Hypertension       HPI:   Had a liver biopsy scheduled blood pressure was systolic 85-97 patient was asymptomatic however that was deferred till his blood pressure came up. extremities no. Dizziness no. Dyspnea on exertion none. Fainting none. Fatigue no. High blood pressure on medication(s). Irregular heart beat no. Leg edema no. Murmurs no. Orthopnea no.       EXAM:   BP 96/68   Pulse 86   Temp 98.6 °F (37 °C) (Oral)   Resp

## 2021-02-18 NOTE — TELEPHONE ENCOUNTER
Appointment scheduled    Future Appointments   Date Time Provider Chris Harvey   2/18/2021  3:30 PM Mickey Barber MD [facility] [place]

## 2021-02-18 NOTE — TELEPHONE ENCOUNTER
Spoke to patient. Biopsy cancelled due to hypotension. Seeing Dr Edgar Schaefer today. He will call and reschedule biopsy. Getting bone scan done tomorrow. Hasn't taken dex yet - picking it up today.     Martha Barajas

## 2021-02-19 ENCOUNTER — HOSPITAL ENCOUNTER (OUTPATIENT)
Dept: NUCLEAR MEDICINE | Facility: HOSPITAL | Age: 61
Discharge: HOME | End: 2021-02-19
Attending: INTERNAL MEDICINE
Payer: COMMERCIAL

## 2021-02-19 DIAGNOSIS — M89.9 LYTIC BONE LESIONS ON XRAY: ICD-10-CM

## 2021-02-19 DIAGNOSIS — M89.9 LYTIC BONE LESIONS ON XRAY: Primary | ICD-10-CM

## 2021-02-19 DIAGNOSIS — R91.8 LUNG MASS: ICD-10-CM

## 2021-02-19 PROCEDURE — 77290 THER RAD SIMULAJ FIELD CPLX: CPT | Performed by: RADIOLOGY

## 2021-02-19 PROCEDURE — 77307 TELETHX ISODOSE PLAN CPLX: CPT | Performed by: RADIOLOGY

## 2021-02-19 PROCEDURE — 77334 RADIATION TREATMENT AID(S): CPT | Performed by: RADIOLOGY

## 2021-02-19 PROCEDURE — 78306 BONE IMAGING WHOLE BODY: CPT | Performed by: INTERNAL MEDICINE

## 2021-02-19 PROCEDURE — 78832 RP LOCLZJ TUM SPECT W/CT 2: CPT | Performed by: INTERNAL MEDICINE

## 2021-02-22 ENCOUNTER — OFFICE VISIT (OUTPATIENT)
Dept: INTERNAL MEDICINE CLINIC | Facility: CLINIC | Age: 61
End: 2021-02-22
Payer: COMMERCIAL

## 2021-02-22 ENCOUNTER — TELEPHONE (OUTPATIENT)
Dept: INTERNAL MEDICINE CLINIC | Facility: CLINIC | Age: 61
End: 2021-02-22

## 2021-02-22 VITALS — SYSTOLIC BLOOD PRESSURE: 142 MMHG | DIASTOLIC BLOOD PRESSURE: 80 MMHG

## 2021-02-22 DIAGNOSIS — I10 ESSENTIAL HYPERTENSION, BENIGN: Primary | ICD-10-CM

## 2021-02-22 PROCEDURE — 3077F SYST BP >= 140 MM HG: CPT | Performed by: INTERNAL MEDICINE

## 2021-02-22 PROCEDURE — 3079F DIAST BP 80-89 MM HG: CPT | Performed by: INTERNAL MEDICINE

## 2021-02-22 RX ORDER — HYDROCODONE BITARTRATE AND ACETAMINOPHEN 10; 325 MG/1; MG/1
1-2 TABLET ORAL EVERY 6 HOURS PRN
Qty: 120 TABLET | Refills: 0 | Status: SHIPPED | OUTPATIENT
Start: 2021-02-22 | End: 2021-02-24

## 2021-02-22 NOTE — TELEPHONE ENCOUNTER
Per VM   Pt's bp is good and he is good to go for his liver biopsy   Pt informed   Verbalizes understanding

## 2021-02-22 NOTE — PROGRESS NOTES
Ir Floor  10/22/1960 is a 62year old male.     Patient presents with:  Blood Pressure       HPI:   Here for BP check  Current Outpatient Medications   Medication Sig Dispense Refill   • umeclidinium-vilanterol 62.5-25 MCG/INH Inhalation Aerosol Use      Smoking status: Current Every Day Smoker        Packs/day: 1.00        Years: 45.00        Pack years: 39        Types: Cigarettes        Start date: 1/8/1981      Smokeless tobacco: Never Used    Vaping Use      Vaping Use: Never used    Alcohol

## 2021-02-23 ENCOUNTER — HOSPITAL ENCOUNTER (OUTPATIENT)
Dept: RADIATION ONCOLOGY | Facility: HOSPITAL | Age: 61
Discharge: HOME | End: 2021-02-23
Attending: RADIOLOGY
Payer: COMMERCIAL

## 2021-02-23 PROCEDURE — 77412 RADIATION TX DELIVERY LVL 3: CPT | Performed by: RADIOLOGY

## 2021-02-23 PROCEDURE — 77280 THER RAD SIMULAJ FIELD SMPL: CPT | Performed by: RADIOLOGY

## 2021-02-24 ENCOUNTER — TELEPHONE (OUTPATIENT)
Dept: HEMATOLOGY/ONCOLOGY | Facility: HOSPITAL | Age: 61
End: 2021-02-24

## 2021-02-24 ENCOUNTER — ORDER TRANSCRIPTION (OUTPATIENT)
Dept: ADMINISTRATIVE | Facility: HOSPITAL | Age: 61
End: 2021-02-24

## 2021-02-24 DIAGNOSIS — Z01.818 PREOP EXAMINATION: Primary | ICD-10-CM

## 2021-02-24 DIAGNOSIS — C79.51 SECONDARY MALIGNANT NEOPLASM OF BONE (HCC): Primary | ICD-10-CM

## 2021-02-24 DIAGNOSIS — Z11.59 ENCOUNTER FOR SCREENING FOR OTHER VIRAL DISEASES: ICD-10-CM

## 2021-02-24 PROCEDURE — 77412 RADIATION TX DELIVERY LVL 3: CPT | Performed by: RADIOLOGY

## 2021-02-24 PROCEDURE — 77387 GUIDANCE FOR RADJ TX DLVR: CPT | Performed by: RADIOLOGY

## 2021-02-24 RX ORDER — HYDROCODONE BITARTRATE AND ACETAMINOPHEN 10; 325 MG/1; MG/1
1-2 TABLET ORAL EVERY 6 HOURS PRN
Qty: 120 TABLET | Refills: 0 | Status: SHIPPED | OUTPATIENT
Start: 2021-02-24 | End: 2021-03-17

## 2021-02-24 NOTE — TELEPHONE ENCOUNTER
Rescheduled liver biopsy for March 3rd @ 10 am. Covid test is on Sunday 2/28 @ 215 pm. Patient made aware of these appointments.

## 2021-02-24 NOTE — TELEPHONE ENCOUNTER
Jes Arias calling trying to reschedule liver bx but is getting the run around from scheduling because they are not sure if he has to do covid test again.  Thank you milad

## 2021-02-25 PROCEDURE — 77387 GUIDANCE FOR RADJ TX DLVR: CPT | Performed by: RADIOLOGY

## 2021-02-25 PROCEDURE — 77412 RADIATION TX DELIVERY LVL 3: CPT | Performed by: RADIOLOGY

## 2021-02-25 RX ORDER — IBUPROFEN 200 MG
400 TABLET ORAL EVERY 6 HOURS PRN
Status: ON HOLD | COMMUNITY
End: 2021-03-04

## 2021-02-25 NOTE — PATIENT INSTRUCTIONS
POST-RADIATION INSTRUCTIONS:   - CALL [phone] FOR A FOLLOW-UP WITH DR. CHRISTOPHER 1 MONTH AFTER RADIATION COMPLETION  - FOLLOW-UP WITH DR. BAXTER AFTER RADIATION COMPLETION  - SIDE EFFECTS OF RADIATION WILL GRADUALLY SUBSIDE.  IT MAY TAKE 1-2 WEEKS POST-RA

## 2021-02-26 ENCOUNTER — HOSPITAL ENCOUNTER (OUTPATIENT)
Dept: RADIATION ONCOLOGY | Facility: HOSPITAL | Age: 61
Discharge: HOME | End: 2021-02-26
Attending: RADIOLOGY
Payer: COMMERCIAL

## 2021-02-26 ENCOUNTER — DOCUMENTATION ONLY (OUTPATIENT)
Dept: RADIATION ONCOLOGY | Facility: HOSPITAL | Age: 61
End: 2021-02-26

## 2021-02-26 DIAGNOSIS — C79.51 SECONDARY MALIGNANT NEOPLASM OF BONE (HCC): Primary | ICD-10-CM

## 2021-02-26 PROCEDURE — 77336 RADIATION PHYSICS CONSULT: CPT | Performed by: RADIOLOGY

## 2021-02-26 PROCEDURE — 77387 GUIDANCE FOR RADJ TX DLVR: CPT | Performed by: RADIOLOGY

## 2021-02-26 PROCEDURE — 77412 RADIATION TX DELIVERY LVL 3: CPT | Performed by: RADIOLOGY

## 2021-02-26 RX ORDER — SENNA AND DOCUSATE SODIUM 50; 8.6 MG/1; MG/1
TABLET, FILM COATED ORAL
Qty: 60 TABLET | Refills: 2 | Status: ON HOLD | OUTPATIENT
Start: 2021-02-26 | End: 2021-06-04

## 2021-02-28 ENCOUNTER — LAB ENCOUNTER (OUTPATIENT)
Dept: LAB | Facility: HOSPITAL | Age: 61
End: 2021-02-28
Attending: INTERNAL MEDICINE
Payer: COMMERCIAL

## 2021-02-28 DIAGNOSIS — Z01.818 PREOP EXAMINATION: ICD-10-CM

## 2021-02-28 DIAGNOSIS — Z11.59 ENCOUNTER FOR SCREENING FOR OTHER VIRAL DISEASES: ICD-10-CM

## 2021-03-01 ENCOUNTER — HOSPITAL ENCOUNTER (OUTPATIENT)
Dept: RADIATION ONCOLOGY | Facility: HOSPITAL | Age: 61
Discharge: HOME | End: 2021-03-01
Attending: RADIOLOGY
Payer: COMMERCIAL

## 2021-03-01 LAB — SARS-COV-2 RNA RESP QL NAA+PROBE: NOT DETECTED

## 2021-03-02 RX ORDER — FLUMAZENIL 0.1 MG/ML
0.2 INJECTION, SOLUTION INTRAVENOUS AS NEEDED
Status: DISCONTINUED | OUTPATIENT
Start: 2021-03-02 | End: 2021-03-05

## 2021-03-02 RX ORDER — SODIUM CHLORIDE 9 MG/ML
INJECTION, SOLUTION INTRAVENOUS CONTINUOUS
Status: DISCONTINUED | OUTPATIENT
Start: 2021-03-02 | End: 2021-03-05

## 2021-03-02 RX ORDER — MIDAZOLAM HYDROCHLORIDE 1 MG/ML
1 INJECTION INTRAMUSCULAR; INTRAVENOUS EVERY 5 MIN PRN
Status: DISCONTINUED | OUTPATIENT
Start: 2021-03-02 | End: 2021-03-05

## 2021-03-02 RX ORDER — FLUMAZENIL 0.1 MG/ML
0.2 INJECTION, SOLUTION INTRAVENOUS AS NEEDED
Status: DISCONTINUED | OUTPATIENT
Start: 2021-03-02 | End: 2021-03-02

## 2021-03-02 RX ORDER — NALOXONE HYDROCHLORIDE 0.4 MG/ML
80 INJECTION, SOLUTION INTRAMUSCULAR; INTRAVENOUS; SUBCUTANEOUS AS NEEDED
Status: DISCONTINUED | OUTPATIENT
Start: 2021-03-02 | End: 2021-03-02

## 2021-03-02 RX ORDER — NALOXONE HYDROCHLORIDE 0.4 MG/ML
80 INJECTION, SOLUTION INTRAMUSCULAR; INTRAVENOUS; SUBCUTANEOUS AS NEEDED
Status: DISCONTINUED | OUTPATIENT
Start: 2021-03-02 | End: 2021-03-05

## 2021-03-02 RX ORDER — MIDAZOLAM HYDROCHLORIDE 1 MG/ML
1 INJECTION INTRAMUSCULAR; INTRAVENOUS EVERY 5 MIN PRN
Status: DISCONTINUED | OUTPATIENT
Start: 2021-03-02 | End: 2021-03-02

## 2021-03-03 ENCOUNTER — TELEPHONE (OUTPATIENT)
Dept: INTERNAL MEDICINE CLINIC | Facility: CLINIC | Age: 61
End: 2021-03-03

## 2021-03-03 ENCOUNTER — HOSPITAL ENCOUNTER (OUTPATIENT)
Dept: ULTRASOUND IMAGING | Facility: HOSPITAL | Age: 61
Discharge: HOME | End: 2021-03-03
Attending: INTERNAL MEDICINE
Payer: COMMERCIAL

## 2021-03-03 ENCOUNTER — APPOINTMENT (OUTPATIENT)
Dept: CT IMAGING | Facility: HOSPITAL | Age: 61
DRG: 309 | End: 2021-03-03
Attending: EMERGENCY MEDICINE
Payer: COMMERCIAL

## 2021-03-03 ENCOUNTER — APPOINTMENT (OUTPATIENT)
Dept: GENERAL RADIOLOGY | Facility: HOSPITAL | Age: 61
DRG: 309 | End: 2021-03-03
Attending: EMERGENCY MEDICINE
Payer: COMMERCIAL

## 2021-03-03 ENCOUNTER — EKG ENCOUNTER (OUTPATIENT)
Dept: LAB | Facility: HOSPITAL | Age: 61
End: 2021-03-03
Attending: INTERNAL MEDICINE
Payer: COMMERCIAL

## 2021-03-03 ENCOUNTER — HOSPITAL ENCOUNTER (INPATIENT)
Facility: HOSPITAL | Age: 61
LOS: 3 days | Discharge: HOME | DRG: 309 | End: 2021-03-06
Attending: EMERGENCY MEDICINE | Admitting: HOSPITALIST
Payer: COMMERCIAL

## 2021-03-03 VITALS
HEART RATE: 99 BPM | OXYGEN SATURATION: 99 % | HEIGHT: 66 IN | RESPIRATION RATE: 26 BRPM | BODY MASS INDEX: 35.52 KG/M2 | TEMPERATURE: 97 F | DIASTOLIC BLOOD PRESSURE: 79 MMHG | WEIGHT: 221 LBS | SYSTOLIC BLOOD PRESSURE: 121 MMHG

## 2021-03-03 DIAGNOSIS — F17.200 SMOKER: Chronic | ICD-10-CM

## 2021-03-03 DIAGNOSIS — C79.51 SECONDARY CANCER OF BONE (HCC): ICD-10-CM

## 2021-03-03 DIAGNOSIS — E78.1 PURE HYPERGLYCERIDEMIA: ICD-10-CM

## 2021-03-03 DIAGNOSIS — R07.89 CHEST PAIN, ATYPICAL: ICD-10-CM

## 2021-03-03 DIAGNOSIS — J44.9 CHRONIC OBSTRUCTIVE PULMONARY DISEASE, UNSPECIFIED COPD TYPE (HCC): ICD-10-CM

## 2021-03-03 DIAGNOSIS — F32.A DEPRESSION, UNSPECIFIED DEPRESSION TYPE: Chronic | ICD-10-CM

## 2021-03-03 DIAGNOSIS — I48.92 NEW ONSET ATRIAL FLUTTER (HCC): ICD-10-CM

## 2021-03-03 DIAGNOSIS — C78.7 LIVER METASTASES (HCC): ICD-10-CM

## 2021-03-03 DIAGNOSIS — R06.02 SHORTNESS OF BREATH: Primary | ICD-10-CM

## 2021-03-03 DIAGNOSIS — I10 ESSENTIAL HYPERTENSION, BENIGN: Chronic | ICD-10-CM

## 2021-03-03 DIAGNOSIS — R45.851 SUICIDAL IDEATION: ICD-10-CM

## 2021-03-03 DIAGNOSIS — R91.8 LUNG MASS: ICD-10-CM

## 2021-03-03 DIAGNOSIS — G89.3 NEOPLASM RELATED PAIN: ICD-10-CM

## 2021-03-03 DIAGNOSIS — R91.8 MASS OF LEFT LUNG: Primary | ICD-10-CM

## 2021-03-03 LAB
ALBUMIN SERPL-MCNC: 3.1 G/DL (ref 3.4–5)
ALBUMIN/GLOB SERPL: 0.9 {RATIO} (ref 1–2)
ALP LIVER SERPL-CCNC: 61 U/L
ALT SERPL-CCNC: 17 U/L
ANION GAP SERPL CALC-SCNC: 3 MMOL/L (ref 0–18)
AST SERPL-CCNC: 14 U/L (ref 15–37)
ATRIAL RATE: 288 BPM
ATRIAL RATE: 326 BPM
BASOPHILS # BLD AUTO: 0.04 X10(3) UL (ref 0–0.2)
BASOPHILS NFR BLD AUTO: 0.6 %
BILIRUB SERPL-MCNC: 0.3 MG/DL (ref 0.1–2)
BUN BLD-MCNC: 15 MG/DL (ref 7–18)
BUN/CREAT SERPL: 12.3 (ref 10–20)
CALCIUM BLD-MCNC: 10.3 MG/DL (ref 8.5–10.1)
CHLORIDE SERPL-SCNC: 103 MMOL/L (ref 98–112)
CO2 SERPL-SCNC: 29 MMOL/L (ref 21–32)
CREAT BLD-MCNC: 1.22 MG/DL
D-DIMER: 3.16 UG/ML FEU (ref ?–0.59)
DEPRECATED RDW RBC AUTO: 52 FL (ref 35.1–46.3)
EOSINOPHIL # BLD AUTO: 0.18 X10(3) UL (ref 0–0.7)
EOSINOPHIL NFR BLD AUTO: 2.5 %
ERYTHROCYTE [DISTWIDTH] IN BLOOD BY AUTOMATED COUNT: 15.2 % (ref 11–15)
GLOBULIN PLAS-MCNC: 3.6 G/DL (ref 2.8–4.4)
GLUCOSE BLD-MCNC: 95 MG/DL (ref 70–99)
HCT VFR BLD AUTO: 42.8 %
HGB BLD-MCNC: 14.1 G/DL
IMM GRANULOCYTES # BLD AUTO: 0.05 X10(3) UL (ref 0–1)
IMM GRANULOCYTES NFR BLD: 0.7 %
INR BLD: 0.96 (ref 0.89–1.11)
LYMPHOCYTES # BLD AUTO: 1.09 X10(3) UL (ref 1–4)
LYMPHOCYTES NFR BLD AUTO: 15.2 %
M PROTEIN MFR SERPL ELPH: 6.7 G/DL (ref 6.4–8.2)
MCH RBC QN AUTO: 31.5 PG (ref 26–34)
MCHC RBC AUTO-ENTMCNC: 32.9 G/DL (ref 31–37)
MCV RBC AUTO: 95.7 FL
MONOCYTES # BLD AUTO: 0.7 X10(3) UL (ref 0.1–1)
MONOCYTES NFR BLD AUTO: 9.8 %
NEUTROPHILS # BLD AUTO: 5.1 X10 (3) UL (ref 1.5–7.7)
NEUTROPHILS # BLD AUTO: 5.1 X10(3) UL (ref 1.5–7.7)
NEUTROPHILS NFR BLD AUTO: 71.2 %
OSMOLALITY SERPL CALC.SUM OF ELEC: 281 MOSM/KG (ref 275–295)
PLATELET # BLD AUTO: 164 10(3)UL (ref 150–450)
POTASSIUM SERPL-SCNC: 5 MMOL/L (ref 3.5–5.1)
PROCALCITONIN SERPL-MCNC: 0.13 NG/ML (ref ?–0.16)
PSA SERPL DL<=0.01 NG/ML-MCNC: 13.1 SECONDS (ref 12.4–14.6)
Q-T INTERVAL: 326 MS
Q-T INTERVAL: 328 MS
QRS DURATION: 72 MS
QRS DURATION: 76 MS
QTC CALCULATION (BEZET): 394 MS
QTC CALCULATION (BEZET): 405 MS
R AXIS: -12 DEGREES
R AXIS: 3 DEGREES
RBC # BLD AUTO: 4.47 X10(6)UL
SARS-COV-2 RNA RESP QL NAA+PROBE: NOT DETECTED
SODIUM SERPL-SCNC: 135 MMOL/L (ref 136–145)
T AXIS: 32 DEGREES
T AXIS: 43 DEGREES
TROPONIN I SERPL-MCNC: <0.045 NG/ML (ref ?–0.04)
TSI SER-ACNC: 1.22 MIU/ML (ref 0.36–3.74)
VENTRICULAR RATE: 88 BPM
VENTRICULAR RATE: 92 BPM
WBC # BLD AUTO: 7.2 X10(3) UL (ref 4–11)

## 2021-03-03 PROCEDURE — 88342 IMHCHEM/IMCYTCHM 1ST ANTB: CPT | Performed by: INTERNAL MEDICINE

## 2021-03-03 PROCEDURE — 99223 1ST HOSP IP/OBS HIGH 75: CPT | Performed by: HOSPITALIST

## 2021-03-03 PROCEDURE — 36415 COLL VENOUS BLD VENIPUNCTURE: CPT

## 2021-03-03 PROCEDURE — 85610 PROTHROMBIN TIME: CPT

## 2021-03-03 PROCEDURE — 71045 X-RAY EXAM CHEST 1 VIEW: CPT | Performed by: EMERGENCY MEDICINE

## 2021-03-03 PROCEDURE — 99152 MOD SED SAME PHYS/QHP 5/>YRS: CPT | Performed by: INTERNAL MEDICINE

## 2021-03-03 PROCEDURE — 71275 CT ANGIOGRAPHY CHEST: CPT | Performed by: EMERGENCY MEDICINE

## 2021-03-03 PROCEDURE — 76942 ECHO GUIDE FOR BIOPSY: CPT | Performed by: INTERNAL MEDICINE

## 2021-03-03 PROCEDURE — 47000 NEEDLE BIOPSY OF LIVER PERQ: CPT | Performed by: INTERNAL MEDICINE

## 2021-03-03 PROCEDURE — 93005 ELECTROCARDIOGRAM TRACING: CPT

## 2021-03-03 PROCEDURE — 88307 TISSUE EXAM BY PATHOLOGIST: CPT | Performed by: INTERNAL MEDICINE

## 2021-03-03 PROCEDURE — 93010 ELECTROCARDIOGRAM REPORT: CPT | Performed by: INTERNAL MEDICINE

## 2021-03-03 PROCEDURE — 88341 IMHCHEM/IMCYTCHM EA ADD ANTB: CPT | Performed by: INTERNAL MEDICINE

## 2021-03-03 PROCEDURE — 74177 CT ABD & PELVIS W/CONTRAST: CPT | Performed by: EMERGENCY MEDICINE

## 2021-03-03 RX ORDER — MIDAZOLAM HYDROCHLORIDE 1 MG/ML
1 INJECTION INTRAMUSCULAR; INTRAVENOUS EVERY 5 MIN PRN
Status: DISCONTINUED | OUTPATIENT
Start: 2021-03-03 | End: 2021-03-05

## 2021-03-03 RX ORDER — HYDROMORPHONE HYDROCHLORIDE 1 MG/ML
0.5 INJECTION, SOLUTION INTRAMUSCULAR; INTRAVENOUS; SUBCUTANEOUS EVERY 30 MIN PRN
Status: DISCONTINUED | OUTPATIENT
Start: 2021-03-03 | End: 2021-03-06

## 2021-03-03 RX ORDER — METOPROLOL SUCCINATE 50 MG/1
200 TABLET, EXTENDED RELEASE ORAL EVERY EVENING
Status: DISCONTINUED | OUTPATIENT
Start: 2021-03-03 | End: 2021-03-05

## 2021-03-03 RX ORDER — FLUMAZENIL 0.1 MG/ML
0.2 INJECTION, SOLUTION INTRAVENOUS AS NEEDED
Status: DISCONTINUED | OUTPATIENT
Start: 2021-03-03 | End: 2021-03-05

## 2021-03-03 RX ORDER — MIDAZOLAM HYDROCHLORIDE 1 MG/ML
INJECTION INTRAMUSCULAR; INTRAVENOUS
Status: DISCONTINUED
Start: 2021-03-03 | End: 2021-03-03

## 2021-03-03 RX ORDER — GABAPENTIN 300 MG/1
300 CAPSULE ORAL 3 TIMES DAILY
Status: DISCONTINUED | OUTPATIENT
Start: 2021-03-03 | End: 2021-03-04

## 2021-03-03 RX ORDER — MIRTAZAPINE 15 MG/1
45 TABLET, FILM COATED ORAL NIGHTLY
Status: DISCONTINUED | OUTPATIENT
Start: 2021-03-03 | End: 2021-03-06

## 2021-03-03 RX ORDER — HYDROCODONE BITARTRATE AND ACETAMINOPHEN 10; 325 MG/1; MG/1
1 TABLET ORAL EVERY 4 HOURS PRN
Status: DISCONTINUED | OUTPATIENT
Start: 2021-03-03 | End: 2021-03-06

## 2021-03-03 RX ORDER — HYDROCODONE BITARTRATE AND ACETAMINOPHEN 10; 325 MG/1; MG/1
1-2 TABLET ORAL EVERY 4 HOURS PRN
Status: DISCONTINUED | OUTPATIENT
Start: 2021-03-03 | End: 2021-03-03

## 2021-03-03 RX ORDER — NICOTINE 21 MG/24HR
1 PATCH, TRANSDERMAL 24 HOURS TRANSDERMAL DAILY
Status: DISCONTINUED | OUTPATIENT
Start: 2021-03-03 | End: 2021-03-06

## 2021-03-03 RX ORDER — HYDROMORPHONE HYDROCHLORIDE 1 MG/ML
0.5 INJECTION, SOLUTION INTRAMUSCULAR; INTRAVENOUS; SUBCUTANEOUS EVERY 30 MIN PRN
Status: CANCELLED | OUTPATIENT
Start: 2021-03-03 | End: 2021-03-03

## 2021-03-03 RX ORDER — FLUMAZENIL 0.1 MG/ML
INJECTION, SOLUTION INTRAVENOUS
Status: DISCONTINUED
Start: 2021-03-03 | End: 2021-03-03 | Stop reason: WASHOUT

## 2021-03-03 RX ORDER — CALCIUM CARBONATE 200(500)MG
500 TABLET,CHEWABLE ORAL 3 TIMES DAILY PRN
Status: COMPLETED | OUTPATIENT
Start: 2021-03-03 | End: 2021-03-04

## 2021-03-03 RX ORDER — IPRATROPIUM BROMIDE AND ALBUTEROL SULFATE 2.5; .5 MG/3ML; MG/3ML
3 SOLUTION RESPIRATORY (INHALATION)
Status: DISCONTINUED | OUTPATIENT
Start: 2021-03-03 | End: 2021-03-04

## 2021-03-03 RX ORDER — KETOROLAC TROMETHAMINE 30 MG/ML
30 INJECTION, SOLUTION INTRAMUSCULAR; INTRAVENOUS ONCE
Status: COMPLETED | OUTPATIENT
Start: 2021-03-03 | End: 2021-03-03

## 2021-03-03 RX ORDER — CALCIUM CARBONATE 200(500)MG
1000 TABLET,CHEWABLE ORAL 3 TIMES DAILY PRN
Status: DISCONTINUED | OUTPATIENT
Start: 2021-03-03 | End: 2021-03-03

## 2021-03-03 RX ORDER — ONDANSETRON 2 MG/ML
4 INJECTION INTRAMUSCULAR; INTRAVENOUS EVERY 4 HOURS PRN
Status: CANCELLED | OUTPATIENT
Start: 2021-03-03 | End: 2021-03-03

## 2021-03-03 RX ORDER — ENOXAPARIN SODIUM 100 MG/ML
40 INJECTION SUBCUTANEOUS DAILY
Status: DISCONTINUED | OUTPATIENT
Start: 2021-03-04 | End: 2021-03-05

## 2021-03-03 RX ORDER — NALOXONE HYDROCHLORIDE 0.4 MG/ML
INJECTION, SOLUTION INTRAMUSCULAR; INTRAVENOUS; SUBCUTANEOUS
Status: DISCONTINUED
Start: 2021-03-03 | End: 2021-03-03 | Stop reason: WASHOUT

## 2021-03-03 RX ORDER — NALOXONE HYDROCHLORIDE 0.4 MG/ML
80 INJECTION, SOLUTION INTRAMUSCULAR; INTRAVENOUS; SUBCUTANEOUS AS NEEDED
Status: DISCONTINUED | OUTPATIENT
Start: 2021-03-03 | End: 2021-03-05

## 2021-03-03 RX ORDER — CALCIUM CARBONATE 200(500)MG
500 TABLET,CHEWABLE ORAL 3 TIMES DAILY PRN
Status: DISCONTINUED | OUTPATIENT
Start: 2021-03-03 | End: 2021-03-03

## 2021-03-03 RX ORDER — SODIUM CHLORIDE 9 MG/ML
INJECTION, SOLUTION INTRAVENOUS CONTINUOUS
Status: DISCONTINUED | OUTPATIENT
Start: 2021-03-03 | End: 2021-03-05

## 2021-03-03 RX ORDER — ACETAMINOPHEN 325 MG/1
650 TABLET ORAL EVERY 6 HOURS PRN
Status: DISCONTINUED | OUTPATIENT
Start: 2021-03-03 | End: 2021-03-06

## 2021-03-03 RX ORDER — MAGNESIUM HYDROXIDE/ALUMINUM HYDROXICE/SIMETHICONE 120; 1200; 1200 MG/30ML; MG/30ML; MG/30ML
30 SUSPENSION ORAL 4 TIMES DAILY PRN
Status: DISCONTINUED | OUTPATIENT
Start: 2021-03-03 | End: 2021-03-06

## 2021-03-03 RX ORDER — ONDANSETRON 2 MG/ML
4 INJECTION INTRAMUSCULAR; INTRAVENOUS EVERY 6 HOURS PRN
Status: DISCONTINUED | OUTPATIENT
Start: 2021-03-03 | End: 2021-03-06

## 2021-03-03 RX ORDER — SODIUM CHLORIDE 9 MG/ML
INJECTION, SOLUTION INTRAVENOUS ONCE
Status: COMPLETED | OUTPATIENT
Start: 2021-03-03 | End: 2021-03-03

## 2021-03-03 RX ORDER — HYDROCODONE BITARTRATE AND ACETAMINOPHEN 10; 325 MG/1; MG/1
2 TABLET ORAL EVERY 4 HOURS PRN
Status: DISCONTINUED | OUTPATIENT
Start: 2021-03-03 | End: 2021-03-06

## 2021-03-03 RX ORDER — QUETIAPINE 100 MG/1
100 TABLET, FILM COATED ORAL NIGHTLY
Status: DISCONTINUED | OUTPATIENT
Start: 2021-03-03 | End: 2021-03-06

## 2021-03-03 RX ORDER — MELATONIN
3 NIGHTLY PRN
Status: DISCONTINUED | OUTPATIENT
Start: 2021-03-03 | End: 2021-03-06

## 2021-03-03 RX ADMIN — SODIUM CHLORIDE: 9 INJECTION, SOLUTION INTRAVENOUS at 11:38:00

## 2021-03-03 RX ADMIN — MIDAZOLAM HYDROCHLORIDE 1 MG: 1 INJECTION INTRAMUSCULAR; INTRAVENOUS at 11:41:00

## 2021-03-03 NOTE — ED NOTES
During assessment patient stated \"I am seeing someone for anxiety and depression. I don't want to be a burden to anyone anymore. I have a bunch of pills. I would probably just take all of those\". Denies intent or previous attempt.   Informed RN and MD

## 2021-03-03 NOTE — H&P
[facility]  History and Physical     Neeru Morse Patient Status:  Emergency    10/22/1960 MRN [id number]   Location [address] Attending Genny Gaytan MD   Hosp Day # 0 PCP Jarek Freed MD     Chief Complaint: (VERSED) 2 MG/2ML injection 1 mg, 1 mg, Intravenous, Q5 Min PRN, Messi ROCHA MD, 1 mg at 03/03/21 1141    •  Naloxone HCl (NARCAN) 0.4 MG/ML injection 80 mcg, 80 mcg, Intravenous, PRN, Maine Isaacs MD    •  Flumazenil (ROMAZICON) injection 0.2 mg, 0 14 point review of systems was completed. Pertinent positives and negatives noted in the HPI.     Physical Exam:    BP 96/67   Pulse 92   Temp 98.8 °F (37.1 °C) (Temporal)   Resp 21   Ht 5' 6\" (1.676 m)   Wt 220 lb (99.8 kg)   SpO2 97%   BMI 35.51 kg/m² dependence    Dispo: as above. Check echo. CHADVASC score low (1) if echo unremarkable.        Quality:  · DVT Prophylaxis: SCD  · CODE status: full  · Tirado: no    Plan of care discussed with ED physician    Deidra Lucas MD  3/3/2021

## 2021-03-03 NOTE — IMAGING NOTE
Received patient in holding. A flutter rate 90'. 12 lead EKG to confirm rhythm. Spoke with Cheo Schmidt RN in Dr Rosalino Mcgee office. Discussed the above.    Patient repots \" I feel frustrated, need this biopsy so I can get my cancer treated, this is second time it

## 2021-03-03 NOTE — CONSULTS
Pulmonary H&P/Consult     NAME: Rolando Portillo - ROOM: [facility] - MRN: [id number] - Age: 62year old - :  10/22/1960    Date of Admission: 3/3/2021 12:13 PM  Admission Diagnosis: No admission diagnoses are documented for this encounter.     Assessment/Plan Essential hypertension, benign    • Former cigarette smoker    • High blood pressure    • Insomnia     Dr. Linda Webber   • Muscle weakness    • Neuropathy    • Problems with swallowing    • Visual impairment     glasses     Past Surgical History:   Procedure La Labs   Lab 03/03/21  1229   GLU 95   BUN 15   CREATSERUM 1.22   GFRAA 75   GFRNAA 64   CA 10.3*   ALB 3.1*   *   K 5.0      CO2 29.0   ALKPHO 61   AST 14*   ALT 17   BILT 0.3   TP 6.7     No results for input(s): BNP in the last 168 hours.   Rec

## 2021-03-03 NOTE — PROGRESS NOTES
[facility]  TREATMENT SUMMARY     PATIENT:  Alexia Gallo MD: Baldomero Roman MD  DIAGNOSIS:  Presumed metastatic lung cancer    HISTORY   13-year-old man with a smoking history presented with low back pain and ri

## 2021-03-03 NOTE — PROCEDURES
[facility]  Procedure Note    Daniel Felton Patient Status:  Outpatient    10/22/1960 MRN [id number]   Location [address] [place] [address] Attending Sylvia Moreno MD   Hosp Day # 0 PCP Andrew Guillen MD     Procedure: focal liver biopsy    P

## 2021-03-03 NOTE — ED PROVIDER NOTES
Patient Seen in: [facility] Emergency Department      History   Patient presents with:  Arrythmia/Palpitations  Difficulty Breathing    Stated Complaint: aflutter during liver biopsy today    HPI/Subjective:   HPI    Patient diagnosed with a right l Visual impairment     glasses              Past Surgical History:   Procedure Laterality Date   • HERNIA SURGERY  1990    Hernia repair    • TONSILLECTOMY      as a child                Social History    Tobacco Use      Smoking status: Current Every Day S noted.  Extremities: Unremarkable. Calves nonswollen, symmetric, nontender. No pedal edema. Neurologic:  Mental status as above. Patient moves all extremities with good strength and coordination.      ED Course     Labs Reviewed   COMP METABOLIC PANEL ( characterized using MRI with Eovist.     3. Ill-defined hypoenhancing lesion within the left lower pole of the kidney which may represent a metastatic lesion.    4. Worsening metastatic lytic lesion of L4 with destruction of the posterior endplate and evide atelectasis and a left hilar mass. Sharlee Johnson is otherwise no acute abnormality. CTA chest  CONCLUSION:       1. No CT evidence of acute pulmonary embolism or acute aortic dissection.     2.  Interval complete atelectasis/consolidation of the left upper lobe diagnosis)  Chest pain, atypical  Lung mass  Chronic obstructive pulmonary disease, unspecified COPD type ([facility] [address]  Smoker  Essential hypertension, benign  Depression, unspecified depression type  Suicidal ideation  New onset atrial flutter ([facility]    German Seal

## 2021-03-03 NOTE — TELEPHONE ENCOUNTER
Antonio Silva RN from radiology called stating:    Patient currently there for liver biopsy. Patient was hypotensive at last appt and appt was r/s for today, patient again is hypotensive (88/70), and noted flutter.     Patient had order for EKG in system from

## 2021-03-03 NOTE — IMAGING NOTE
Pt here for liver biopsy. His mother Diego is waiting in her car. Pre-procedure vitals reveal atrial fibrillation w/controlled rate in the 90's. BP in the 82'X systolic bilaterally and pt denies any c/o dizziness or SOB.  No history of atrial fibrillation in

## 2021-03-03 NOTE — IMAGING NOTE
NPO status verified  Pertinent labs and radiology imaging reviewed  Consent signed and on file    Patient tolerated procedural sedation without any immediate complications noted. Biopsy site to right upper quarter with tegaderm  dressing. C/D/I.  Patient d

## 2021-03-03 NOTE — ED INITIAL ASSESSMENT (HPI)
Aflutter noted during out patient liver biopsy today. New onset for patient. Patient denies chest pain or palpitations. C/O SOB since procedure and left hip pain.

## 2021-03-03 NOTE — ED NOTES
Pt states he is at the end of his patience, has been here forever and we're not taking care of his pain,  No relief from toradol

## 2021-03-04 ENCOUNTER — APPOINTMENT (OUTPATIENT)
Dept: ULTRASOUND IMAGING | Facility: HOSPITAL | Age: 61
DRG: 309 | End: 2021-03-04
Attending: HOSPITALIST
Payer: COMMERCIAL

## 2021-03-04 ENCOUNTER — APPOINTMENT (OUTPATIENT)
Dept: CV DIAGNOSTICS | Facility: HOSPITAL | Age: 61
DRG: 309 | End: 2021-03-04
Attending: HOSPITALIST
Payer: COMMERCIAL

## 2021-03-04 PROBLEM — E83.52 HYPERCALCEMIA: Status: ACTIVE | Noted: 2021-03-04

## 2021-03-04 PROBLEM — G89.3 NEOPLASM RELATED PAIN: Status: ACTIVE | Noted: 2021-03-04

## 2021-03-04 PROBLEM — C79.51 SECONDARY CANCER OF BONE (HCC): Status: ACTIVE | Noted: 2021-03-04

## 2021-03-04 LAB
ALBUMIN SERPL-MCNC: 3 G/DL (ref 3.4–5)
ALBUMIN/GLOB SERPL: 0.9 {RATIO} (ref 1–2)
ALP LIVER SERPL-CCNC: 52 U/L
ALT SERPL-CCNC: 13 U/L
ANION GAP SERPL CALC-SCNC: 7 MMOL/L (ref 0–18)
AST SERPL-CCNC: 7 U/L (ref 15–37)
BILIRUB SERPL-MCNC: 0.5 MG/DL (ref 0.1–2)
BUN BLD-MCNC: 20 MG/DL (ref 7–18)
BUN/CREAT SERPL: 16.8 (ref 10–20)
CALCIUM BLD-MCNC: 9.6 MG/DL (ref 8.5–10.1)
CEA SERPL-MCNC: 22.1 NG/ML (ref ?–5)
CHLORIDE SERPL-SCNC: 104 MMOL/L (ref 98–112)
CO2 SERPL-SCNC: 26 MMOL/L (ref 21–32)
CREAT BLD-MCNC: 1.19 MG/DL
GLOBULIN PLAS-MCNC: 3.4 G/DL (ref 2.8–4.4)
GLUCOSE BLD-MCNC: 71 MG/DL (ref 70–99)
M PROTEIN MFR SERPL ELPH: 6.4 G/DL (ref 6.4–8.2)
OSMOLALITY SERPL CALC.SUM OF ELEC: 285 MOSM/KG (ref 275–295)
POTASSIUM SERPL-SCNC: 4.2 MMOL/L (ref 3.5–5.1)
SARS-COV-2 RNA RESP QL NAA+PROBE: NOT DETECTED
SODIUM SERPL-SCNC: 137 MMOL/L (ref 136–145)

## 2021-03-04 PROCEDURE — 93306 TTE W/DOPPLER COMPLETE: CPT | Performed by: HOSPITALIST

## 2021-03-04 PROCEDURE — 93970 EXTREMITY STUDY: CPT | Performed by: HOSPITALIST

## 2021-03-04 PROCEDURE — 99232 SBSQ HOSP IP/OBS MODERATE 35: CPT | Performed by: HOSPITALIST

## 2021-03-04 PROCEDURE — 99254 IP/OBS CNSLTJ NEW/EST MOD 60: CPT | Performed by: INTERNAL MEDICINE

## 2021-03-04 PROCEDURE — 90792 PSYCH DIAG EVAL W/MED SRVCS: CPT | Performed by: OTHER

## 2021-03-04 PROCEDURE — 99232 SBSQ HOSP IP/OBS MODERATE 35: CPT | Performed by: INTERNAL MEDICINE

## 2021-03-04 RX ORDER — GABAPENTIN 300 MG/1
600 CAPSULE ORAL 3 TIMES DAILY
Status: DISCONTINUED | OUTPATIENT
Start: 2021-03-04 | End: 2021-03-06

## 2021-03-04 RX ORDER — ALBUTEROL SULFATE 90 UG/1
2 AEROSOL, METERED RESPIRATORY (INHALATION) EVERY 4 HOURS PRN
Status: DISCONTINUED | OUTPATIENT
Start: 2021-03-04 | End: 2021-03-06

## 2021-03-04 RX ORDER — ALBUTEROL SULFATE 90 UG/1
2 AEROSOL, METERED RESPIRATORY (INHALATION) EVERY 4 HOURS PRN
Qty: 1 INHALER | Refills: 0 | Status: SHIPPED | OUTPATIENT
Start: 2021-03-04 | End: 2021-03-13

## 2021-03-04 RX ORDER — GABAPENTIN 300 MG/1
600 CAPSULE ORAL 3 TIMES DAILY
Qty: 90 CAPSULE | Refills: 0 | Status: SHIPPED | OUTPATIENT
Start: 2021-03-04 | End: 2021-03-25

## 2021-03-04 RX ORDER — IPRATROPIUM BROMIDE AND ALBUTEROL SULFATE 2.5; .5 MG/3ML; MG/3ML
3 SOLUTION RESPIRATORY (INHALATION)
Status: DISCONTINUED | OUTPATIENT
Start: 2021-03-04 | End: 2021-03-06

## 2021-03-04 RX ORDER — MORPHINE SULFATE 30 MG/1
30 TABLET, FILM COATED, EXTENDED RELEASE ORAL EVERY 12 HOURS SCHEDULED
Status: DISCONTINUED | OUTPATIENT
Start: 2021-03-04 | End: 2021-03-06

## 2021-03-04 RX ORDER — CALCIUM CARBONATE 200(500)MG
1000 TABLET,CHEWABLE ORAL 3 TIMES DAILY PRN
Status: DISCONTINUED | OUTPATIENT
Start: 2021-03-04 | End: 2021-03-06

## 2021-03-04 RX ORDER — ALBUTEROL SULFATE 90 UG/1
2 AEROSOL, METERED RESPIRATORY (INHALATION) 4 TIMES DAILY
Status: DISCONTINUED | OUTPATIENT
Start: 2021-03-04 | End: 2021-03-04

## 2021-03-04 RX ORDER — MORPHINE SULFATE 15 MG/1
30 TABLET, FILM COATED, EXTENDED RELEASE ORAL EVERY 12 HOURS SCHEDULED
Status: DISCONTINUED | OUTPATIENT
Start: 2021-03-04 | End: 2021-03-04

## 2021-03-04 NOTE — CONSULTS
[facility]  Report of Psychiatric Consultation    Irl Floor Patient Status:  Inpatient    10/22/1960 MRN [id number]   [facility] 3NE-A Attending Kanika Corbett MD   Hosp Day # 1 PCP Christi Sanchez MD     Date of Admission: 3/3/ him feel helpless and hopeless. He had NO intention or plan or hurting himself. He just had the transient thought that being dead would be easier, since he wouldn't have to worry about fighting cancer. Psychiatry Review Systems: No voices or visions.  Selvin Aaron drugs.     Allergies:    Pcn [Penicillins]           Comment:Allergy as a child    Medications:    Current Facility-Administered Medications:   •  ipratropium-albuterol (DUONEB) nebulizer solution 3 mL, 3 mL, Nebulization, QID  •  morphINE Sulfate ER (MS CO Flumazenil (ROMAZICON) injection 0.2 mg, 0.2 mg, Intravenous, PRN  •  Midazolam HCl (VERSED) 2 MG/2ML injection 1 mg, 1 mg, Intravenous, Q5 Min PRN  •  0.9% NaCl infusion, , Intravenous, Continuous  •  Naloxone HCl (NARCAN) 0.4 MG/ML injection 80 mcg, 80 m

## 2021-03-04 NOTE — PROGRESS NOTES
NURSING ADMISSION NOTE      Patient admitted via Cart  Oriented to room. Safety precautions initiated. Bed in low position. Call light in reach.     Pt A&Ox3 Room Air  Sitting upright in bed  C/O of sacral pain 10/10 Norco given per Mar.  C/O upset sto

## 2021-03-04 NOTE — CONSULTS
[facility]    Report of Cardiology Consultation    Leleavelina Cardona Patient Status:  Inpatient    10/22/1960 MRN [id number]   [facility] 3NE-A Attending Silviano Mcmullen MD   Hosp Day # 1 PCP Mary Brown MD     Date of Admission:  3/ History  Patient Parents    John Nithya (Mother)    Other Topics            Concern  Caffeine Concern        Yes  Exercise                No    Social History Narrative    Single, currently living with mother            Current Medications:    •  HY Midazolam HCl (VERSED) 2 MG/2ML injection 1 mg, 1 mg, Intravenous, Q5 Min PRN    •  0.9% NaCl infusion, , Intravenous, Continuous    •  Naloxone HCl (NARCAN) 0.4 MG/ML injection 80 mcg, 80 mcg, Intravenous, PRN        •  Senna-Docusate Sodium 8.6-50 MG Ora murmur. Lungs: Clear with normal effort. Normal excursions and effort. Abdomen: Soft, non-tender. BS-present. Extremities: Without clubbing, cyanosis. Peripheral pulsespresent. Neurologic: Alert and oriented, normal affect. Motor ok.   Skin: Warm and

## 2021-03-04 NOTE — PLAN OF CARE
Assumed patient care at 7:30  Droplet and contact precautions awaiting PCR   A/O x4   Room air   NSR on tele   Voids per bathroom and urinal   Pain noted in the back and gave medications per MAR   Up with SBA   Regular diet   Right AC SL  Electrolyte kaden

## 2021-03-04 NOTE — CONSULTS
[facility]  Report of Consultation    Yesi Law Patient Status:  Inpatient    10/22/1960 MRN [id number]   [facility] 3NE-A Attending Nini Senior MD   Hosp Day # 1 PCP Khadijah Bob MD     Reason for Consultation:    Ant Granados Hernia repair    • TONSILLECTOMY      as a child     Family History   Problem Relation Age of Onset   • Hypertension Other         Grandparents, Family history of   • Heart Disease Other         Grandparents,Family history of CAD    • Other (Blood Clots QID PRN  •  Calcium Carbonate Antacid (TUMS) chewable tab 500 mg, 500 mg, Oral, TID PRN    Facility-Administered Medications Ordered in Other Encounters:   •  0.9% NaCl infusion, , Intravenous, Continuous  •  Midazolam HCl (VERSED) 2 MG/2ML injection 1 mg, Time: 03/03/21  8:57 AM   Result Value Ref Range    PT 13.1 12.4 - 14.6 seconds    INR 0.96 0.89 - 1.11   EKG 12-LEAD    Collection Time: 03/03/21 10:01 AM   Result Value Ref Range    Ventricular rate 88 BPM    Atrial rate 326 BPM    P-R Interval  ms    QR I    Collection Time: 03/03/21 12:29 PM   Result Value Ref Range    Troponin <0.045 <0.045 ng/mL   CBC W/ DIFFERENTIAL    Collection Time: 03/03/21 12:29 PM   Result Value Ref Range    WBC 7.2 4.0 - 11.0 x10(3) uL    RBC 4.47 4.30 - 5.70 x10(6)uL    HGB 14 275 - 295 mOsm/kg    GFR, Non- 66 >=60    GFR, -American 77 >=60    AST 7 (L) 15 - 37 U/L    ALT 13 (L) 16 - 61 U/L    Alkaline Phosphatase 52 45 - 117 U/L    Bilirubin, Total 0.5 0.1 - 2.0 mg/dL    Total Protein 6.4 6.4 - 8.2 g/dL from COVID-19 infection, developing pneumonia, with nonspecific pneumonitis, or other etiologies not entirely excluded. Clinical correlation recommended.   There is bronchial wall thickening and scattered mucus plugging that could be due to underlying colon.  No large or small bowel dilatation. No free air or free fluid. ABDOMINAL WALL:  Medium-sized fat containing right inguinal hernia. Small fat containing left inguinal hernia.   PELVIC ORGANS:  Urinary bladder wall thickening could be due to chroni acetabulum. 7.  Splenomegaly. 8. Urinary bladder wall thickening could be due to chronic changes of outlet obstruction, with cystitis not excluded. Clinical correlation recommended.      9. Stable 5 mm noncalcified pulmonary nodules in the right sujatha Elevated D dimer - likely due to malignancy. OK to check US.     7)  COPD - per pulmonary    Drea Junior MD  3/4/2021  7:41 AM

## 2021-03-04 NOTE — PROGRESS NOTES
[facility] [address] Patient Status:  Inpatient    10/22/1960 MRN [id number]   [facility] 3NE-A Attending Nini Senior MD   Hosp Day # 1 PCP Khadijah Bob MD     SUBJECTIVE: Pt denies SOB, having some lower back pain. mcg, Intravenous, PRN  •  Flumazenil (ROMAZICON) injection 0.2 mg, 0.2 mg, Intravenous, PRN  •  0.9% NaCl infusion, , Intravenous, Continuous  •  fentaNYL citrate (SUBLIMAZE) 0.05 MG/ML injection 50 mcg, 50 mcg, Intravenous, Q5 Min PRN  •  Flumazenil (VIELKA ASSESSMENT/PLAN:  1. Shortness of breath  - In the setting of progressive likely primary lung malignancy with collapse of the left upper lobe. I suspect this is from extrinsic compression or endobronchial tumor burden.    - Pt also in aflutter which is ne

## 2021-03-04 NOTE — PROGRESS NOTES
PSYCH CONSULT    Date of Admission: 3/3/21  Date of Consult: 3/4/21  Reason for Consultation: Suicidal ideation    Impression:  Primary Psychiatric Diagnosis:  Passive suicidal ideation with NO intention or plan or hurting himself.      Major depressive dis

## 2021-03-04 NOTE — PAYOR COMM NOTE
--------------  ADMISSION REVIEW     Payor: Milady Grewal JOHN REINOSO  Subscriber #:  [id number]  Authorization Number: [id number]    Admit date: 3/3/21  Admit time: 2002     Admitting Physician: Donna Perry MD  Attending Physician:  Donna Perry MD Patient was questioned on suicidal thought. Patient tells me that he has had thoughts of suicide. He tells me that he has \"plenty of medicine that will do it \". He states that he is frustrated about his condition.  He is also frustrated that he is been fo Abdomen: Soft, protuberant but nondistended. Some tenderness in the right upper quadrant and suprapubic abdomen noted. Extremities: Unremarkable. Calves nonswollen, symmetric, nontender. No pedal edema. Neurologic:  Mental status as above.   Patient mov 5. Additional bony lesions within the right superior acetabulum and left iliac bone consistent with metastatic disease     MDM      Patient complaining of shortness of breath all day. With his history of cancer, pulmonary embolism must be excluded.   He pr   2. Interval complete atelectasis/consolidation of the left upper lobe due to central obstructing mass as seen on previous CT of the chest which is now obscured.  There is stable narrowing of the arterial vasculature of the left upper lobe near the hilum. New onset atrial flutter ([facility] [address]    Disposition:  Admit  3/3/2021  3:56 pm    Present on Admission  Date Reviewed: 2/26/2021          ICD-10-CM Noted POA    Shortness of breath R06.02 3/3/2021 Unknown     Luis Gregg MD on 3/3/2021  3:56 PM         H&P si Neurologic: No focal neurological deficits. CNII-XII grossly intact. Musculoskeletal: Moves all extremities. Extremities: No edema or cyanosis. Integument: No rashes or lesions. Psychiatric: Appropriate mood and affect.     Diagnostic Data:      Recent BP 96/67   Pulse 92   Temp 98.8 °F (37.1 °C) (Temporal)   Resp 21   Ht 5' 6\" (1.676 m)   Wt 220 lb (99.8 kg)   SpO2 97%   BMI 35.51 kg/m²     Assessment/Plan:  1.  Shortness of breath  - In the setting of progressive likely primary lung malignancy with col 1)  Probable metastatic lung cancer. Biopsy finally obtained. Call in to path to get results asap. Will send tissue for PDL-1 and next gen sequencing to see if he's a candidate for immunotherapy or targeted therapy.   In the meantime will likely start on Enoxaparin Sodium (LOVENOX) 40 MG/0.4ML injection 40 mg   Dose: 40 mg  Freq: Daily Route: SC  Start: 03/04/21 0930     8286-Given                gabapentin (NEURONTIN) cap 300 mg   Dose: 300 mg  Freq: 3 times daily Route: OR  Start: 03/03/21 2100 2120-G Dose: 4 mg  Freq:  Once Route: IV  Last Dose: 4 mg (03/04/21 0829)  Start: 03/04/21 0830 End: 03/04/21 0849    Admin Instructions:   administer as a single-dose IV infusion in a vented line separate from other drugs; infuse over no less than 15 minutes, ove FOR REVIEW/APPROVAL OF INPT ADMISSION

## 2021-03-04 NOTE — PROGRESS NOTES
[facility]  Progress Note     Mulu Wright Patient Status:  Inpatient    10/22/1960 MRN [id number]   [facility] 3NE-A Attending Jaron Staples MD   Hosp Day # 1 PCP Leroy Obrien MD     Chief Complaint: dyspnea    S: Patient f Nebulization QID   • morphINE Sulfate ER  30 mg Oral Q12H   • umeclidinium-vilanterol  1 puff Inhalation Daily   • Albuterol Sulfate HFA  2 puff Inhalation QID   • gabapentin  600 mg Oral TID   • mirtazapine  45 mg Oral Nightly   • Metoprolol Succinate ER

## 2021-03-05 ENCOUNTER — APPOINTMENT (OUTPATIENT)
Dept: HEMATOLOGY/ONCOLOGY | Facility: HOSPITAL | Age: 61
End: 2021-03-05
Attending: INTERNAL MEDICINE
Payer: COMMERCIAL

## 2021-03-05 ENCOUNTER — NURSE NAVIGATOR ENCOUNTER (OUTPATIENT)
Dept: HEMATOLOGY/ONCOLOGY | Facility: HOSPITAL | Age: 61
End: 2021-03-05

## 2021-03-05 ENCOUNTER — APPOINTMENT (OUTPATIENT)
Dept: MRI IMAGING | Facility: HOSPITAL | Age: 61
DRG: 309 | End: 2021-03-05
Attending: INTERNAL MEDICINE
Payer: COMMERCIAL

## 2021-03-05 LAB
ANION GAP SERPL CALC-SCNC: 7 MMOL/L (ref 0–18)
BUN BLD-MCNC: 17 MG/DL (ref 7–18)
BUN/CREAT SERPL: 13.5 (ref 10–20)
CALCIUM BLD-MCNC: 8.8 MG/DL (ref 8.5–10.1)
CHLORIDE SERPL-SCNC: 104 MMOL/L (ref 98–112)
CO2 SERPL-SCNC: 25 MMOL/L (ref 21–32)
CREAT BLD-MCNC: 1.26 MG/DL
GLUCOSE BLD-MCNC: 79 MG/DL (ref 70–99)
OSMOLALITY SERPL CALC.SUM OF ELEC: 282 MOSM/KG (ref 275–295)
POTASSIUM SERPL-SCNC: 4 MMOL/L (ref 3.5–5.1)
SODIUM SERPL-SCNC: 136 MMOL/L (ref 136–145)

## 2021-03-05 PROCEDURE — 70553 MRI BRAIN STEM W/O & W/DYE: CPT | Performed by: INTERNAL MEDICINE

## 2021-03-05 PROCEDURE — 99232 SBSQ HOSP IP/OBS MODERATE 35: CPT | Performed by: OTHER

## 2021-03-05 PROCEDURE — 05HB33Z INSERTION OF INFUSION DEVICE INTO RIGHT BASILIC VEIN, PERCUTANEOUS APPROACH: ICD-10-PCS | Performed by: HOSPITALIST

## 2021-03-05 PROCEDURE — 99232 SBSQ HOSP IP/OBS MODERATE 35: CPT | Performed by: INTERNAL MEDICINE

## 2021-03-05 PROCEDURE — 99233 SBSQ HOSP IP/OBS HIGH 50: CPT | Performed by: NURSE PRACTITIONER

## 2021-03-05 PROCEDURE — B54MZZA ULTRASONOGRAPHY OF RIGHT UPPER EXTREMITY VEINS, GUIDANCE: ICD-10-PCS | Performed by: HOSPITALIST

## 2021-03-05 PROCEDURE — 99233 SBSQ HOSP IP/OBS HIGH 50: CPT | Performed by: HOSPITALIST

## 2021-03-05 PROCEDURE — 3E03305 INTRODUCTION OF OTHER ANTINEOPLASTIC INTO PERIPHERAL VEIN, PERCUTANEOUS APPROACH: ICD-10-PCS | Performed by: INTERNAL MEDICINE

## 2021-03-05 RX ORDER — SENNOSIDES 8.6 MG
8.6 TABLET ORAL 2 TIMES DAILY
Status: DISCONTINUED | OUTPATIENT
Start: 2021-03-05 | End: 2021-03-06

## 2021-03-05 RX ORDER — DIPHENHYDRAMINE HYDROCHLORIDE 50 MG/ML
50 INJECTION INTRAMUSCULAR; INTRAVENOUS ONCE
Status: COMPLETED | OUTPATIENT
Start: 2021-03-05 | End: 2021-03-05

## 2021-03-05 RX ORDER — POLYETHYLENE GLYCOL 3350 17 G/17G
17 POWDER, FOR SOLUTION ORAL DAILY
Status: DISCONTINUED | OUTPATIENT
Start: 2021-03-05 | End: 2021-03-06

## 2021-03-05 RX ORDER — SODIUM CHLORIDE 9 MG/ML
30 INJECTION INTRAVENOUS ONCE
Status: DISCONTINUED | OUTPATIENT
Start: 2021-03-05 | End: 2021-03-06

## 2021-03-05 RX ORDER — FAMOTIDINE 10 MG/ML
20 INJECTION, SOLUTION INTRAVENOUS ONCE
Status: COMPLETED | OUTPATIENT
Start: 2021-03-05 | End: 2021-03-05

## 2021-03-05 RX ORDER — METOPROLOL TARTRATE 100 MG/1
100 TABLET ORAL
Status: DISCONTINUED | OUTPATIENT
Start: 2021-03-05 | End: 2021-03-06

## 2021-03-05 RX ORDER — DILTIAZEM HYDROCHLORIDE 5 MG/ML
5 INJECTION INTRAVENOUS EVERY 2 HOUR PRN
Status: DISCONTINUED | OUTPATIENT
Start: 2021-03-05 | End: 2021-03-06

## 2021-03-05 NOTE — PROGRESS NOTES
Heme/Onc Progress Note    Patient Name: Andrade Nunez   YOB: 1960   Medical Record Number: [id number]   CSN: [id number]   Attending Physician: Ange Chanel M.D. Subjective:  Patient seen twice today - early am and now.   Has iopsy Oral, TID  •  Albuterol Sulfate  (90 Base) MCG/ACT inhaler 2 puff, 2 puff, Inhalation, Q4H PRN  •  Calcium Carbonate Antacid (TUMS) chewable tab 1,000 mg, 1,000 mg, Oral, TID PRN  •  HYDROmorphone HCl (DILAUDID) 1 MG/ML injection 0.5 mg, 0.5 mg, Int mg/dL    Sodium 136 136 - 145 mmol/L    Potassium 4.0 3.5 - 5.1 mmol/L    Chloride 104 98 - 112 mmol/L    CO2 25.0 21.0 - 32.0 mmol/L    Anion Gap 7 0 - 18 mmol/L    BUN 17 7 - 18 mg/dL    Creatinine 1.26 0.70 - 1.30 mg/dL    BUN/CREA Ratio 13.5 10.0 - 20. PDL-1 and next gen sequencing to see if he's a candidate for immunotherapy alone or targeted therapy. Given progressive bone destruction in pelvis will gave zometa  (had mild hypercalcemia on presentation - better with hydration).   Will then finalize maki

## 2021-03-05 NOTE — PLAN OF CARE
Assumed care at 1. Pt is A&O x4. On Ra, . 02 sats WNL, denies any SOB. NSR on tele. l Voids. C/o hip and leg pain, gave Mozier PRN per [facility] and scheduled MS contin PO. Standby assist with cane. Liver biopsy site C/D/I. Regular diet. Saline locked.  Cards

## 2021-03-05 NOTE — PROGRESS NOTES
[facility]UL[facility]ITICAL C[facility]    S: pt is having some dyspnea, not much cough.      Meds:  • metoprolol tartrate  100 mg Oral 2x Daily(Beta Blocker)   • ipratropium-albuterol  3 mL Nebulization QID   • morphINE Sulfate ER  30 mg Oral Q12H   • umeclidinium-christina secondary to atrial flutter, metastatic lung cancer with collapse of the left upper lobe, and underlying severe COPD. CTA was neg for PE  - aflutter as below  - COPD Meds as below  - treatment of lung cancer per onc  2.  Aflutter/fib- Rate controlled  - Car

## 2021-03-05 NOTE — PROGRESS NOTES
IV Chemotherapy Education    Drug names:   Carboplatin, paclitaxel     Learner:  Patient    Barriers / Limitations:  Drowsy from medication    Chemotherapy education goals:  · Learn the drug names  · Administration schedule  · Routes of administration  · T

## 2021-03-05 NOTE — PROGRESS NOTES
[facility]  Progress Note     Mulu Wright Patient Status:  Inpatient    10/22/1960 MRN [id number]   [address] Attending Jaron Staples MD   Hosp Day # 2 PCP Leroy Obrien MD     Chief Complaint: dyspnea    S: Patient f Markers  Recent Labs   Lab 03/03/21  1229   DDIMER 3.16*       Imaging: Imaging data reviewed in Epic.     Medications:   • metoprolol tartrate  100 mg Oral 2x Daily(Beta Blocker)   • PACLItaxel (TAXOL) chemo infusion  175 mg/m2 Intravenous Once    Followed

## 2021-03-05 NOTE — PROGRESS NOTES
Pharmacy note: Chemotherapy order clarification    (1) Paclitaxel dose was rounded to 366  mg based on BSA from updated weight in Epic and will be diluted in 500  ml NS which will run over 3 hours per protocol.     (2) Carboplatin dose was calculated at 580

## 2021-03-05 NOTE — PROGRESS NOTES
Next generation genomic sequencing order sent in online to [facility] for tumor only testing. Specimen [id number] collected 03/03/2021 to be tested;  Additional testing requested: PDL1, MMR.

## 2021-03-05 NOTE — PROGRESS NOTES
[facility]  Report of Psychiatric Progress Note    Lynnette Kaye Patient Status:  Inpatient    10/22/1960 MRN [id number]   [facility] 3NE-A Attending Ashu Molina MD   Hosp Day # 2 PCP Jodie Powell MD     Date of Admission: 3/3 NO intention or plan or hurting himself. He just had the transient thought that being dead would be easier, since he wouldn't have to worry about fighting cancer. Interval Hx:  3/5/21- He feels less anxious and less helpless today.  He is glad the biops pack-year smoking history. He has never used smokeless tobacco. He reports current alcohol use of about 4.0 standard drinks of alcohol per week. He reports that he does not use drugs.     Allergies:    Pcn [Penicillins]           Comment:Allergy as a child mg, Oral, Nightly PRN  •  nicotine (NICODERM CQ) 21 MG/24HR 1 patch, 1 patch, Transdermal, Daily  •  HYDROcodone-acetaminophen (NORCO)  MG per tab 1 tablet, 1 tablet, Oral, Q4H PRN **OR** HYDROcodone-acetaminophen (NORCO)  MG per tab 2 tablet,

## 2021-03-05 NOTE — PROGRESS NOTES
[facility]  Cardiology Progress Note    Ochopee Si Patient Status:  Inpatient    10/22/1960 MRN [id number]   [facility] 3NE-A Attending Jovani Peck MD   Hosp Day # 2 PCP Elvina Bence, MD       Subjective:  Sitting up in bed. There was no diagnostic evidence for regional wall motion abnormalities. Impressions:  No previous study was available for comparison. Assessment:  • New onset Afib/flutter following liver biopsy  • Probable metastatic lung cancer, heme following.

## 2021-03-05 NOTE — PROGRESS NOTES
Resumed care at [facility] to name and , and place only  Up with SBA and cane  Vs wnl, RA, afib on tele  S/w Wendi APN for cardiology in regards to afib, controlled rate, elevated tho overnight.  Awaiting brain MRI prior to starting any anticoags

## 2021-03-05 NOTE — PROGRESS NOTES
Transferred from [facility]. A&O times 4. MRI done. Chemo orders received. IV team to place midline. Complaints of back pain when returning from MRI, dilaudid ivp given per mar with good relief. Afib on monitor, -150s cardizem 5mg given.   HR now in 11

## 2021-03-06 VITALS
HEART RATE: 87 BPM | WEIGHT: 225.13 LBS | OXYGEN SATURATION: 95 % | SYSTOLIC BLOOD PRESSURE: 117 MMHG | BODY MASS INDEX: 36.18 KG/M2 | TEMPERATURE: 98 F | DIASTOLIC BLOOD PRESSURE: 72 MMHG | RESPIRATION RATE: 15 BRPM | HEIGHT: 66 IN

## 2021-03-06 PROCEDURE — 99233 SBSQ HOSP IP/OBS HIGH 50: CPT | Performed by: SPECIALIST

## 2021-03-06 PROCEDURE — 99232 SBSQ HOSP IP/OBS MODERATE 35: CPT | Performed by: NURSE PRACTITIONER

## 2021-03-06 PROCEDURE — 99239 HOSP IP/OBS DSCHRG MGMT >30: CPT | Performed by: HOSPITALIST

## 2021-03-06 RX ORDER — METOPROLOL TARTRATE 100 MG/1
100 TABLET ORAL
Qty: 60 TABLET | Refills: 3 | Status: SHIPPED | OUTPATIENT
Start: 2021-03-06 | End: 2021-06-08

## 2021-03-06 RX ORDER — MORPHINE SULFATE 30 MG/1
30 TABLET, FILM COATED, EXTENDED RELEASE ORAL EVERY 12 HOURS SCHEDULED
Qty: 60 TABLET | Refills: 0 | Status: SHIPPED | OUTPATIENT
Start: 2021-03-06 | End: 2021-04-09

## 2021-03-06 NOTE — PROGRESS NOTES
Taxol and carboplatnin completed and tolerated well. Good blood return via midline. Up to bathroom to void. Using call light for assistance. No shortness of breath. Heart rate in 90's. Rhythm afib. In good spirits. Used 2 norco for pain.

## 2021-03-06 NOTE — PROGRESS NOTES
Heme/Onc Progress Note    Patient Name: Jorje Salazar   YOB: 1960   Medical Record Number: [id number]     Subjective:  Had chemotherapy yesterday. No adverse effects. Pain is well controlled. Breathing is comfortable.      Objective:    Vi Q6H PRN  •  melatonin tab 3 mg, 3 mg, Oral, Nightly PRN  •  nicotine (NICODERM CQ) 21 MG/24HR 1 patch, 1 patch, Transdermal, Daily  •  HYDROcodone-acetaminophen (NORCO)  MG per tab 1 tablet, 1 tablet, Oral, Q4H PRN **OR** HYDROcodone-acetaminophen (N

## 2021-03-06 NOTE — PROGRESS NOTES
[facility]    S: Patient was started on chemotherapy. He tolerated it well. He says his back pain is better. He is breathing ok. Wants to go home.     Meds:  • metoprolol tartrate  100 mg Oral 2x Daily(Beta Blocker)   • apixaban  5 mg Oral Imaging reviewed    Assessment and Plan    1. Dyspnea - secondary to atrial flutter, metastatic lung cancer with collapse of the left upper lobe, and underlying severe COPD.  CTA was neg for PE  - aflutter as below  - COPD Meds as below  - treatment

## 2021-03-06 NOTE — CM/SW NOTE
Call from Raciel Garza [phone] regarding order placed today to check cost of Eliquis. Script sent electronically to pts [facility] in [facility]. CM called there [phone]. SPoke with Radha Almeida.  Scripts is filled, no prior authorization was needed, pts copay is $

## 2021-03-06 NOTE — PROGRESS NOTES
[facility]  Cardiology Progress Note    Vida Landa Patient Status:  Inpatient    10/22/1960 MRN [id number]   [facility] 3NE-A Attending Harsh Gilmore MD   Hosp Day # 3 PCP Evin Naranjo MD       Subjective:  Converted back to fraction was 65-70%.    There was no diagnostic evidence for regional wall motion abnormalities. Impressions:  No previous study was available for comparison.      Assessment:  • New onset Afib/flutter following liver biopsy  o Echo with preserved EF,

## 2021-03-07 NOTE — DISCHARGE SUMMARY
[facility]  DISCHARGE SUMMARY     Keyana Daugherty Patient Status:  Inpatient    10/22/1960 MRN [id number]   [facility] 4NW-A Attending No att. providers found   Hosp Day # 3 PCP Subha Armas MD     Date of Admission: 3/3/2021  Da the lungs every 4 (four) hours as needed for Wheezing or Shortness of Breath. Quantity: 1 Inhaler  Refills: 0     apixaban 5 MG Tabs  Commonly known as: ELIQUIS      Take 1 tablet (5 mg total) by mouth 2 (two) times daily.    Quantity: 60 tablet  Refills: medications were sent to [place] [address] [id number] Lore Perera, [address], [phone], [address], [id number]    Phone: [phone]   · apixaban 5 MG Tabs  · Metoprolol T your test, avoid carbohydrates (no candy, gum, mints, ice cream, cake, sweets, or soda pop). -Do not eat any food for 6 hours before your appointment time; please only drink plain water. ON THE DAY OF YOUR EXAM  -Dress warmly and comfortably.   No meta with your estimated remaining deductible and coinsurance balance for your services at the time of check in.    - (Payment) Please be aware that you may be asked for payment at the time of service.    - (Questions) If you would like more information about y [employer] facility. Please bring your insurance card and photo ID. You will also need to bring your doctor's order unless your physician's office submitted the order electronically or faxed the order.  Without the order, your test may be to a physician visit. -No visitors are allowed in the infusion area.  -All visitors and patients will be screened for a temperatue greater than 100 degrees before entering our sites. -We encourage patients, who are receiving treatments in the infusion area,

## 2021-03-07 NOTE — PROGRESS NOTES
Patient denies having any pain or discomfort. Patient discharged from all  Md's, tolerated chemo yesterday with no complaints. Vitals stable, afebrile, chemo precautions in place. Patient left per wheelchair, see discharge information record.

## 2021-03-08 ENCOUNTER — HOSPITAL ENCOUNTER (OUTPATIENT)
Dept: NUCLEAR MEDICINE | Facility: HOSPITAL | Age: 61
Discharge: HOME | End: 2021-03-08
Attending: INTERNAL MEDICINE
Payer: COMMERCIAL

## 2021-03-08 DIAGNOSIS — C34.92 PRIMARY MALIGNANT NEOPLASM OF LEFT LUNG METASTATIC TO OTHER SITE (HCC): Primary | ICD-10-CM

## 2021-03-08 DIAGNOSIS — C79.51 SECONDARY CANCER OF BONE (HCC): ICD-10-CM

## 2021-03-08 DIAGNOSIS — R91.8 LUNG MASS: ICD-10-CM

## 2021-03-08 LAB — GLUCOSE BLD-MCNC: 124 MG/DL (ref 70–99)

## 2021-03-08 PROCEDURE — 78815 PET IMAGE W/CT SKULL-THIGH: CPT | Performed by: INTERNAL MEDICINE

## 2021-03-08 PROCEDURE — 82962 GLUCOSE BLOOD TEST: CPT

## 2021-03-08 RX ORDER — FAMOTIDINE 10 MG/ML
20 INJECTION, SOLUTION INTRAVENOUS ONCE
Status: CANCELLED | OUTPATIENT
Start: 2021-03-10

## 2021-03-08 RX ORDER — DIPHENHYDRAMINE HYDROCHLORIDE 50 MG/ML
50 INJECTION INTRAMUSCULAR; INTRAVENOUS ONCE
Status: CANCELLED | OUTPATIENT
Start: 2021-03-10

## 2021-03-08 RX ORDER — PROCHLORPERAZINE MALEATE 10 MG
10 TABLET ORAL EVERY 6 HOURS PRN
Qty: 30 TABLET | Refills: 3 | Status: SHIPPED | OUTPATIENT
Start: 2021-03-08 | End: 2021-03-13 | Stop reason: ALTCHOICE

## 2021-03-08 RX ORDER — ONDANSETRON HYDROCHLORIDE 8 MG/1
8 TABLET, FILM COATED ORAL EVERY 8 HOURS PRN
Qty: 30 TABLET | Refills: 3 | Status: SHIPPED | OUTPATIENT
Start: 2021-03-08 | End: 2021-09-14

## 2021-03-09 ENCOUNTER — OFFICE VISIT (OUTPATIENT)
Dept: HEMATOLOGY/ONCOLOGY | Age: 61
End: 2021-03-09
Attending: INTERNAL MEDICINE
Payer: COMMERCIAL

## 2021-03-09 VITALS
DIASTOLIC BLOOD PRESSURE: 60 MMHG | BODY MASS INDEX: 34.98 KG/M2 | OXYGEN SATURATION: 93 % | HEIGHT: 66 IN | SYSTOLIC BLOOD PRESSURE: 88 MMHG | HEART RATE: 76 BPM | TEMPERATURE: 99 F | WEIGHT: 217.63 LBS | RESPIRATION RATE: 18 BRPM

## 2021-03-09 DIAGNOSIS — G89.3 NEOPLASM RELATED PAIN: ICD-10-CM

## 2021-03-09 DIAGNOSIS — Z51.12 ENCOUNTER FOR ANTINEOPLASTIC IMMUNOTHERAPY: ICD-10-CM

## 2021-03-09 DIAGNOSIS — C34.92 PRIMARY MALIGNANT NEOPLASM OF LEFT LUNG METASTATIC TO OTHER SITE (HCC): ICD-10-CM

## 2021-03-09 DIAGNOSIS — C79.51 SECONDARY CANCER OF BONE (HCC): ICD-10-CM

## 2021-03-09 DIAGNOSIS — C34.92 PRIMARY MALIGNANT NEOPLASM OF LEFT LUNG METASTATIC TO OTHER SITE (HCC): Primary | ICD-10-CM

## 2021-03-09 DIAGNOSIS — G89.3 CHRONIC PAIN DUE TO MALIGNANT NEOPLASTIC DISEASE: ICD-10-CM

## 2021-03-09 DIAGNOSIS — Z71.9 ENCOUNTER FOR HEALTH EDUCATION: Primary | ICD-10-CM

## 2021-03-09 PROCEDURE — 99214 OFFICE O/P EST MOD 30 MIN: CPT | Performed by: CLINICAL NURSE SPECIALIST

## 2021-03-09 PROCEDURE — 96413 CHEMO IV INFUSION 1 HR: CPT

## 2021-03-09 NOTE — PROGRESS NOTES
[facility]   Arrives Ambulating independently, accompanied by Self           Pregnancy screening: Not applicable    Modifications in dose or schedule: No     Frequency of blood return and site check throughout administration: Prior to administrat

## 2021-03-09 NOTE — PROGRESS NOTES
ANP Visit Note    Patient Name: Perla Frazier   YOB: 1960   Medical Record Number: [id number]   CSN: [id number]   Date of visit: 3/9/2021   Elvina Bence, MD   No primary care provider on file.      Chief Complaint/Reason for Visit:  Aziza Aaron Chronic pain due to malignant neoplastic disease       Medical History:  Past Medical History:   Diagnosis Date   • Anxiety     Dr. Waleska Franklin   • Back problem    • COPD (chronic obstructive pulmonary disease) ([facility]    • Depression     Dr. Waleska Franklin   • Essential Asked        Weight Concern: Not Asked         Service: Not Asked        Blood Transfusions: Not Asked        Occupational Exposure: Not Asked        Hobby Hazards: Not Asked        Sleep Concern: Not Asked        Back Care: Not Asked        Bike H Daily(Beta Blocker). , Disp: 60 tablet, Rfl: 3  •  gabapentin 300 MG Oral Cap, Take 2 capsules (600 mg total) by mouth 3 (three) times daily. , Disp: 90 capsule, Rfl: 0  •  umeclidinium-vilanterol 62.5-25 MCG/INH Inhalation Aerosol Powder, Breath Activated, appropriate. Labs:     Recent Results (from the past 72 hour(s))   POCT GLUCOSE    Collection Time: 03/08/21 11:03 AM   Result Value Ref Range    POC Glucose 124 (H) 70 - 99 mg/dL           Impression/Plan:  1.  Metastatic Squamous Cell Carcinoma: revie

## 2021-03-09 NOTE — PROGRESS NOTES
Reveiwed results. Discussed seperately with Dr Alina Mott. He facilitated a biopsy. Was positive for cancer. He otto be seeing Dr Alina Mott / onc team today.

## 2021-03-09 NOTE — PROGRESS NOTES
Reveiwed results. Discussed seperately with Dr Robin Mccray. He facilitated a biopsy. Was positive for cancer.  He otto be seeing Dr Robin Mccray / onc team today

## 2021-03-10 ENCOUNTER — TELEPHONE (OUTPATIENT)
Dept: HEMATOLOGY/ONCOLOGY | Facility: HOSPITAL | Age: 61
End: 2021-03-10

## 2021-03-10 NOTE — TELEPHONE ENCOUNTER
Toxicities: C1 D1 Paclitaxel/Carboplatin on 3/5/2021 inpatient  C1 Pembrolizumab on 3/9/2021     Dyspnea/Nausea/Diarrhea/Pain      Dyspnea: Grade 1 jGeo is having dyspnea with he is laying in bed and when up walking. He has not taken his inhalers today.

## 2021-03-13 ENCOUNTER — OFFICE VISIT (OUTPATIENT)
Dept: INTERNAL MEDICINE CLINIC | Facility: CLINIC | Age: 61
End: 2021-03-13
Payer: COMMERCIAL

## 2021-03-13 VITALS
OXYGEN SATURATION: 98 % | DIASTOLIC BLOOD PRESSURE: 64 MMHG | HEART RATE: 112 BPM | TEMPERATURE: 98 F | SYSTOLIC BLOOD PRESSURE: 100 MMHG | RESPIRATION RATE: 20 BRPM

## 2021-03-13 DIAGNOSIS — L30.4 INTERTRIGO: ICD-10-CM

## 2021-03-13 DIAGNOSIS — J44.9 CHRONIC OBSTRUCTIVE PULMONARY DISEASE, UNSPECIFIED COPD TYPE (HCC): ICD-10-CM

## 2021-03-13 DIAGNOSIS — C34.90 MALIGNANT NEOPLASM OF LUNG, UNSPECIFIED LATERALITY, UNSPECIFIED PART OF LUNG (HCC): Primary | ICD-10-CM

## 2021-03-13 PROBLEM — R79.89 ELEVATED SERUM CREATININE: Status: RESOLVED | Noted: 2019-02-26 | Resolved: 2021-03-13

## 2021-03-13 PROBLEM — C79.51 SECONDARY CANCER OF BONE (HCC): Chronic | Status: ACTIVE | Noted: 2021-03-04

## 2021-03-13 PROBLEM — E78.1 PURE HYPERGLYCERIDEMIA: Status: RESOLVED | Noted: 2019-02-26 | Resolved: 2021-03-13

## 2021-03-13 PROBLEM — E83.52 HYPERCALCEMIA: Chronic | Status: ACTIVE | Noted: 2021-03-04

## 2021-03-13 PROBLEM — G89.3 NEOPLASM RELATED PAIN: Chronic | Status: ACTIVE | Noted: 2021-03-04

## 2021-03-13 PROBLEM — R07.89 CHEST PAIN, ATYPICAL: Status: RESOLVED | Noted: 2021-03-03 | Resolved: 2021-03-13

## 2021-03-13 PROBLEM — R06.02 SHORTNESS OF BREATH: Chronic | Status: ACTIVE | Noted: 2021-03-03

## 2021-03-13 PROBLEM — D49.2 LUMBAR SPINE TUMOR: Chronic | Status: ACTIVE | Noted: 2021-01-08

## 2021-03-13 PROCEDURE — 3074F SYST BP LT 130 MM HG: CPT | Performed by: INTERNAL MEDICINE

## 2021-03-13 PROCEDURE — 3008F BODY MASS INDEX DOCD: CPT | Performed by: INTERNAL MEDICINE

## 2021-03-13 PROCEDURE — 3078F DIAST BP <80 MM HG: CPT | Performed by: INTERNAL MEDICINE

## 2021-03-13 PROCEDURE — 99495 TRANSJ CARE MGMT MOD F2F 14D: CPT | Performed by: INTERNAL MEDICINE

## 2021-03-13 RX ORDER — ALBUTEROL SULFATE 90 UG/1
2 AEROSOL, METERED RESPIRATORY (INHALATION) EVERY 4 HOURS PRN
Qty: 1 INHALER | Refills: 0 | Status: SHIPPED | OUTPATIENT
Start: 2021-03-13 | End: 2021-03-26

## 2021-03-13 RX ORDER — CLOTRIMAZOLE AND BETAMETHASONE DIPROPIONATE 10; .64 MG/G; MG/G
1 CREAM TOPICAL 2 TIMES DAILY PRN
Qty: 45 G | Refills: 3 | Status: SHIPPED | OUTPATIENT
Start: 2021-03-13

## 2021-03-13 NOTE — PATIENT INSTRUCTIONS
15 minutes was spent updating the patient's med sheet bring in his problem list up to date  And advised to follow-up with his specialists per their recommendations

## 2021-03-13 NOTE — PROGRESS NOTES
Rolando Portillo  10/22/1960 is a 62year old male. Patient presents with:  Hospital F/U       HPI:   Post hospital follow-up.   Patient does have follow-up appointment with a cardiologist oncologist.  Currently does have a rash in the intergluteal re Depression     Dr. Mariela Bartholomew   • Essential hypertension, benign    • Former cigarette smoker    • High blood pressure    • Insomnia     Dr. Mariela Bartholomew   • Muscle weakness    • Neuropathy    • Problems with swallowing    • Visual impairment     glasses      Social Application topically 2 (two) times daily as needed. Chronic obstructive pulmonary disease, unspecified COPD type ([facility]    Other orders  -     umeclidinium-vilanterol 62.5-25 MCG/INH Inhalation Aerosol Powder, Breath Activated;  Inhale 1 puff into the sujatha

## 2021-03-17 DIAGNOSIS — C79.51 SECONDARY MALIGNANT NEOPLASM OF BONE (HCC): ICD-10-CM

## 2021-03-17 DIAGNOSIS — Z23 NEED FOR VACCINATION: ICD-10-CM

## 2021-03-17 RX ORDER — HYDROCODONE BITARTRATE AND ACETAMINOPHEN 10; 325 MG/1; MG/1
1-2 TABLET ORAL EVERY 6 HOURS PRN
Qty: 120 TABLET | Refills: 0 | Status: SHIPPED | OUTPATIENT
Start: 2021-03-17 | End: 2021-04-09

## 2021-03-18 ENCOUNTER — IMMUNIZATION (OUTPATIENT)
Dept: LAB | Age: 61
End: 2021-03-18
Attending: HOSPITALIST
Payer: COMMERCIAL

## 2021-03-18 DIAGNOSIS — Z23 NEED FOR VACCINATION: Primary | ICD-10-CM

## 2021-03-18 PROCEDURE — 0001A SARSCOV2 VAC 30MCG/0.3ML IM: CPT

## 2021-03-22 ENCOUNTER — DOCUMENTATION ONLY (OUTPATIENT)
Dept: HEMATOLOGY/ONCOLOGY | Facility: HOSPITAL | Age: 61
End: 2021-03-22

## 2021-03-22 NOTE — PROGRESS NOTES
Nutrition screen complete as triggered by Best Practice dx of Metastatic Squamous Cell NSCLC, Immunotherapy. Chart reviewed. Pt appears nutritionally stable at present. RD available on consult.

## 2021-03-23 ENCOUNTER — APPOINTMENT (OUTPATIENT)
Dept: HEMATOLOGY/ONCOLOGY | Facility: HOSPITAL | Age: 61
End: 2021-03-23
Attending: INTERNAL MEDICINE
Payer: COMMERCIAL

## 2021-03-23 PROBLEM — J41.8 MIXED SIMPLE AND MUCOPURULENT CHRONIC BRONCHITIS (HCC): Status: ACTIVE | Noted: 2021-03-23

## 2021-03-25 ENCOUNTER — OFFICE VISIT (OUTPATIENT)
Dept: HEMATOLOGY/ONCOLOGY | Age: 61
End: 2021-03-25
Attending: INTERNAL MEDICINE
Payer: COMMERCIAL

## 2021-03-25 VITALS
RESPIRATION RATE: 20 BRPM | HEIGHT: 65.98 IN | BODY MASS INDEX: 35.26 KG/M2 | SYSTOLIC BLOOD PRESSURE: 106 MMHG | WEIGHT: 219.38 LBS | OXYGEN SATURATION: 92 % | HEART RATE: 75 BPM | DIASTOLIC BLOOD PRESSURE: 68 MMHG | TEMPERATURE: 98 F

## 2021-03-25 DIAGNOSIS — C79.51 SECONDARY CANCER OF BONE (HCC): ICD-10-CM

## 2021-03-25 DIAGNOSIS — C34.92 PRIMARY MALIGNANT NEOPLASM OF LEFT LUNG METASTATIC TO OTHER SITE (HCC): Primary | ICD-10-CM

## 2021-03-25 DIAGNOSIS — C34.92 PRIMARY MALIGNANT NEOPLASM OF LEFT LUNG METASTATIC TO OTHER SITE (HCC): ICD-10-CM

## 2021-03-25 DIAGNOSIS — G89.3 CHRONIC PAIN DUE TO MALIGNANT NEOPLASTIC DISEASE: Primary | ICD-10-CM

## 2021-03-25 LAB
ALBUMIN SERPL-MCNC: 3 G/DL (ref 3.4–5)
ALBUMIN/GLOB SERPL: 0.9 {RATIO} (ref 1–2)
ALP LIVER SERPL-CCNC: 58 U/L
ALT SERPL-CCNC: 14 U/L
ANION GAP SERPL CALC-SCNC: 5 MMOL/L (ref 0–18)
AST SERPL-CCNC: 14 U/L (ref 15–37)
BASOPHILS # BLD AUTO: 0.06 X10(3) UL (ref 0–0.2)
BASOPHILS NFR BLD AUTO: 0.8 %
BILIRUB SERPL-MCNC: 0.4 MG/DL (ref 0.1–2)
BUN BLD-MCNC: 12 MG/DL (ref 7–18)
BUN/CREAT SERPL: 12.5 (ref 10–20)
CALCIUM BLD-MCNC: 9.1 MG/DL (ref 8.5–10.1)
CHLORIDE SERPL-SCNC: 108 MMOL/L (ref 98–112)
CO2 SERPL-SCNC: 25 MMOL/L (ref 21–32)
CREAT BLD-MCNC: 0.96 MG/DL
DEPRECATED RDW RBC AUTO: 51 FL (ref 35.1–46.3)
EOSINOPHIL # BLD AUTO: 0.15 X10(3) UL (ref 0–0.7)
EOSINOPHIL NFR BLD AUTO: 2.1 %
ERYTHROCYTE [DISTWIDTH] IN BLOOD BY AUTOMATED COUNT: 14.7 % (ref 11–15)
GLOBULIN PLAS-MCNC: 3.5 G/DL (ref 2.8–4.4)
GLUCOSE BLD-MCNC: 98 MG/DL (ref 70–99)
HCT VFR BLD AUTO: 35.3 %
HGB BLD-MCNC: 11.6 G/DL
IMM GRANULOCYTES # BLD AUTO: 0.07 X10(3) UL (ref 0–1)
IMM GRANULOCYTES NFR BLD: 1 %
LYMPHOCYTES # BLD AUTO: 1.26 X10(3) UL (ref 1–4)
LYMPHOCYTES NFR BLD AUTO: 17.6 %
M PROTEIN MFR SERPL ELPH: 6.5 G/DL (ref 6.4–8.2)
MCH RBC QN AUTO: 31.1 PG (ref 26–34)
MCHC RBC AUTO-ENTMCNC: 32.9 G/DL (ref 31–37)
MCV RBC AUTO: 94.6 FL
MONOCYTES # BLD AUTO: 0.5 X10(3) UL (ref 0.1–1)
MONOCYTES NFR BLD AUTO: 7 %
NEUTROPHILS # BLD AUTO: 5.1 X10 (3) UL (ref 1.5–7.7)
NEUTROPHILS # BLD AUTO: 5.1 X10(3) UL (ref 1.5–7.7)
NEUTROPHILS NFR BLD AUTO: 71.5 %
OSMOLALITY SERPL CALC.SUM OF ELEC: 286 MOSM/KG (ref 275–295)
PATIENT FASTING Y/N/NP: NO
PLATELET # BLD AUTO: 235 10(3)UL (ref 150–450)
POTASSIUM SERPL-SCNC: 4.6 MMOL/L (ref 3.5–5.1)
RBC # BLD AUTO: 3.73 X10(6)UL
SODIUM SERPL-SCNC: 138 MMOL/L (ref 136–145)
TSI SER-ACNC: 1.17 MIU/ML (ref 0.36–3.74)
WBC # BLD AUTO: 7.1 X10(3) UL (ref 4–11)

## 2021-03-25 PROCEDURE — 96417 CHEMO IV INFUS EACH ADDL SEQ: CPT

## 2021-03-25 PROCEDURE — S0028 INJECTION, FAMOTIDINE, 20 MG: HCPCS | Performed by: INTERNAL MEDICINE

## 2021-03-25 PROCEDURE — 96413 CHEMO IV INFUSION 1 HR: CPT

## 2021-03-25 PROCEDURE — 96415 CHEMO IV INFUSION ADDL HR: CPT

## 2021-03-25 PROCEDURE — 96375 TX/PRO/DX INJ NEW DRUG ADDON: CPT

## 2021-03-25 PROCEDURE — 96549 UNLISTED CHEMOTHERAPY PX: CPT

## 2021-03-25 PROCEDURE — 99214 OFFICE O/P EST MOD 30 MIN: CPT | Performed by: INTERNAL MEDICINE

## 2021-03-25 RX ORDER — DIPHENHYDRAMINE HYDROCHLORIDE 50 MG/ML
50 INJECTION INTRAMUSCULAR; INTRAVENOUS ONCE
Status: COMPLETED | OUTPATIENT
Start: 2021-03-25 | End: 2021-03-25

## 2021-03-25 RX ORDER — FAMOTIDINE 10 MG/ML
20 INJECTION, SOLUTION INTRAVENOUS ONCE
Status: CANCELLED | OUTPATIENT
Start: 2021-03-25

## 2021-03-25 RX ORDER — FAMOTIDINE 10 MG/ML
20 INJECTION, SOLUTION INTRAVENOUS ONCE
Status: COMPLETED | OUTPATIENT
Start: 2021-03-25 | End: 2021-03-25

## 2021-03-25 RX ORDER — DIPHENHYDRAMINE HYDROCHLORIDE 50 MG/ML
50 INJECTION INTRAMUSCULAR; INTRAVENOUS ONCE
Status: CANCELLED | OUTPATIENT
Start: 2021-03-25

## 2021-03-25 RX ORDER — GABAPENTIN 300 MG/1
600 CAPSULE ORAL 3 TIMES DAILY
Qty: 90 CAPSULE | Refills: 5 | Status: SHIPPED | OUTPATIENT
Start: 2021-03-25 | End: 2021-07-26

## 2021-03-25 RX ADMIN — DIPHENHYDRAMINE HYDROCHLORIDE 50 MG: 50 INJECTION INTRAMUSCULAR; INTRAVENOUS at 11:18:00

## 2021-03-25 RX ADMIN — FAMOTIDINE 20 MG: 10 INJECTION, SOLUTION INTRAVENOUS at 11:20:00

## 2021-03-25 NOTE — PROGRESS NOTES
Patient is here for MD f/leanna for Lung cancer and cycle 2 of Taxol/Carbo/Keytruda. Patient saw Dr Kanika Hendrix, pulmonologist on Monday. Steroid inhaler dose was increased. Patient reports SOB with exertion and cough. Decrease energy. In a wheelchair today.  Dayanara

## 2021-03-25 NOTE — PROGRESS NOTES
Pt here for C2D1.   Arrives Ambulating independently, accompanied by Self          Modifications in dose or schedule: No    Pt denies current pregnancy     Frequency of blood return and site check throughout administration: Prior to administration   Dischar

## 2021-03-26 ENCOUNTER — PATIENT MESSAGE (OUTPATIENT)
Dept: HEMATOLOGY/ONCOLOGY | Age: 61
End: 2021-03-26

## 2021-03-26 ENCOUNTER — TELEPHONE (OUTPATIENT)
Dept: HEMATOLOGY/ONCOLOGY | Facility: HOSPITAL | Age: 61
End: 2021-03-26

## 2021-03-26 RX ORDER — ALBUTEROL SULFATE 90 UG/1
AEROSOL, METERED RESPIRATORY (INHALATION)
Qty: 8.5 G | Refills: 0 | Status: SHIPPED | OUTPATIENT
Start: 2021-03-26

## 2021-03-26 NOTE — TELEPHONE ENCOUNTER
Medication(s) to Refill:   Requested Prescriptions     Pending Prescriptions Disp Refills   • ALBUTEROL SULFATE  (90 Base) MCG/ACT Inhalation Aero Soln [Pharmacy Med Name: ALBUTEROL HFA INH (200 PUFFS)8.5GM] 8.5 g 0     Sig: INHALE 2 PUFFS INTO THE [facility] VACCINE RESOURCE    Patient Instructions:     Important information about your COVID-19 vaccine  To minimize risk of COVID-19 exposure, please come alone to your appointment.  If you need a support partner du version  [email or web address] pdf        Location Instructions: Your appointment is scheduled at [facility].  The addres Your appointment is scheduled at the [facility] in [place]. The address is Una Carlson [address] [place] [address]. Please park at the [address] [place] [address] and enter through [address].  Once you arrive, please register at the [address] [place] [address] on

## 2021-03-26 NOTE — TELEPHONE ENCOUNTER
From: Ritu Bowman  To: Christo Holder MD  Sent: 3/26/2021 8:57 AM CDT  Subject: Other    How many chemo sessions do I need total?

## 2021-03-26 NOTE — TELEPHONE ENCOUNTER
Patient will be getting Zometa every 6 weeks. He received his first dose in the hospital. Next dose on 4/15. Patient made aware.

## 2021-03-27 ENCOUNTER — APPOINTMENT (OUTPATIENT)
Dept: CT IMAGING | Facility: HOSPITAL | Age: 61
End: 2021-03-27
Attending: EMERGENCY MEDICINE
Payer: COMMERCIAL

## 2021-03-27 ENCOUNTER — HOSPITAL ENCOUNTER (EMERGENCY)
Facility: HOSPITAL | Age: 61
Discharge: HOME | End: 2021-03-27
Attending: EMERGENCY MEDICINE
Payer: COMMERCIAL

## 2021-03-27 VITALS
TEMPERATURE: 100 F | OXYGEN SATURATION: 96 % | SYSTOLIC BLOOD PRESSURE: 110 MMHG | RESPIRATION RATE: 13 BRPM | BODY MASS INDEX: 35 KG/M2 | HEART RATE: 73 BPM | DIASTOLIC BLOOD PRESSURE: 65 MMHG | WEIGHT: 219.38 LBS

## 2021-03-27 DIAGNOSIS — J40 BRONCHITIS: Primary | ICD-10-CM

## 2021-03-27 DIAGNOSIS — R04.2 COUGH WITH HEMOPTYSIS: ICD-10-CM

## 2021-03-27 LAB
ALBUMIN SERPL-MCNC: 3.3 G/DL (ref 3.4–5)
ALBUMIN/GLOB SERPL: 0.9 {RATIO} (ref 1–2)
ALP LIVER SERPL-CCNC: 55 U/L
ALT SERPL-CCNC: 29 U/L
ANION GAP SERPL CALC-SCNC: 8 MMOL/L (ref 0–18)
AST SERPL-CCNC: 48 U/L (ref 15–37)
BASOPHILS # BLD AUTO: 0.02 X10(3) UL (ref 0–0.2)
BASOPHILS NFR BLD AUTO: 0.4 %
BILIRUB SERPL-MCNC: 0.5 MG/DL (ref 0.1–2)
BUN BLD-MCNC: 25 MG/DL (ref 7–18)
BUN/CREAT SERPL: 24.8 (ref 10–20)
CALCIUM BLD-MCNC: 10.1 MG/DL (ref 8.5–10.1)
CHLORIDE SERPL-SCNC: 104 MMOL/L (ref 98–112)
CO2 SERPL-SCNC: 25 MMOL/L (ref 21–32)
CREAT BLD-MCNC: 1.01 MG/DL
DEPRECATED RDW RBC AUTO: 49.2 FL (ref 35.1–46.3)
EOSINOPHIL # BLD AUTO: 0 X10(3) UL (ref 0–0.7)
EOSINOPHIL NFR BLD AUTO: 0 %
ERYTHROCYTE [DISTWIDTH] IN BLOOD BY AUTOMATED COUNT: 14.6 % (ref 11–15)
GLOBULIN PLAS-MCNC: 3.8 G/DL (ref 2.8–4.4)
GLUCOSE BLD-MCNC: 90 MG/DL (ref 70–99)
HCT VFR BLD AUTO: 35.1 %
HGB BLD-MCNC: 11.8 G/DL
IMM GRANULOCYTES # BLD AUTO: 0.04 X10(3) UL (ref 0–1)
IMM GRANULOCYTES NFR BLD: 0.7 %
LYMPHOCYTES # BLD AUTO: 0.88 X10(3) UL (ref 1–4)
LYMPHOCYTES NFR BLD AUTO: 16.1 %
M PROTEIN MFR SERPL ELPH: 7.1 G/DL (ref 6.4–8.2)
MCH RBC QN AUTO: 31 PG (ref 26–34)
MCHC RBC AUTO-ENTMCNC: 33.6 G/DL (ref 31–37)
MCV RBC AUTO: 92.1 FL
MONOCYTES # BLD AUTO: 0.32 X10(3) UL (ref 0.1–1)
MONOCYTES NFR BLD AUTO: 5.9 %
NEUTROPHILS # BLD AUTO: 4.19 X10 (3) UL (ref 1.5–7.7)
NEUTROPHILS # BLD AUTO: 4.19 X10(3) UL (ref 1.5–7.7)
NEUTROPHILS NFR BLD AUTO: 76.9 %
OSMOLALITY SERPL CALC.SUM OF ELEC: 288 MOSM/KG (ref 275–295)
PLATELET # BLD AUTO: 210 10(3)UL (ref 150–450)
POTASSIUM SERPL-SCNC: 4.7 MMOL/L (ref 3.5–5.1)
RBC # BLD AUTO: 3.81 X10(6)UL
SODIUM SERPL-SCNC: 137 MMOL/L (ref 136–145)
WBC # BLD AUTO: 5.5 X10(3) UL (ref 4–11)

## 2021-03-27 PROCEDURE — 99284 EMERGENCY DEPT VISIT MOD MDM: CPT

## 2021-03-27 PROCEDURE — 36415 COLL VENOUS BLD VENIPUNCTURE: CPT

## 2021-03-27 PROCEDURE — 71260 CT THORAX DX C+: CPT | Performed by: EMERGENCY MEDICINE

## 2021-03-27 PROCEDURE — 85025 COMPLETE CBC W/AUTO DIFF WBC: CPT | Performed by: EMERGENCY MEDICINE

## 2021-03-27 PROCEDURE — 80053 COMPREHEN METABOLIC PANEL: CPT | Performed by: EMERGENCY MEDICINE

## 2021-03-27 NOTE — ED PROVIDER NOTES
Patient Seen in: [facility] Emergency Department      History   Patient presents with:  Hemoptysis    Stated Complaint: coughing up blood started this am.     HPI/Subjective:   HPI    67-year-old male presents to the emergency department complaining negative except as noted above.     Physical Exam     ED Triage Vitals [03/27/21 1112]   /81   Pulse 93   Resp 14   Temp 99.6 °F (37.6 °C)   Temp src Temporal   SpO2 100 %   O2 Device None (Room air)       Current:/65   Pulse 73   Temp 99.6 °F ( Status                     ---------                               -----------         ------                     CBC W/ DIFFERENTIAL[[id number]]          Abnormal            Final result                 Please view results for these tests on the individual upper abdomen demonstrate known to cm right intrahepatic mass not as well appreciated as on previous CT performed 3/3/2021.     BONES:  No bony lesion or fracture.  Uptake noted on recent PET within the left 4th rib and scapula without definite CT abnormal follow-up as an outpatient. Told return for any worsening symptoms. Discharged in good condition.                        Disposition and Plan     Clinical Impression:  Bronchitis  (primary encounter diagnosis)  Cough with hemoptysis    Disposition:  Disch

## 2021-03-27 NOTE — ED INITIAL ASSESSMENT (HPI)
A/o x3, pt coughed up, \"spotted blood,'in sputum this morning, it happened at least every hour since 5am, denies chest pain or dizziness, fevers or night sweats but mild ZOHAIB with rest.

## 2021-03-30 NOTE — PROGRESS NOTES
[facility]    PATIENT'S NAME: Savita Negra   ATTENDING PHYSICIAN: Abbey Chong M.D.    PATIENT ACCOUNT #: [de-identified] LOCATION: [address] [place] [address] RECORD #: [id number] YOB: 1960   DATE OF SERVICE: 03/25/2021       CANCER GERHARD clotrimazole/betamethasone external cream b.i.d. p.r.n., Trelegy Ellipta 1 puff daily, gabapentin 600 mg 3 times a day, hydrocodone 10/325 1 to 2 tablets q.i.d. p.r.n.; metoprolol tartrate 100 mg b.i.d., mirtazapine 45 mg nightly, morphine sulfate extended M.D. Doreatha Meckel, M.D. Darlyne Reels, M.D.

## 2021-04-05 ENCOUNTER — TELEPHONE (OUTPATIENT)
Dept: INTERNAL MEDICINE CLINIC | Facility: CLINIC | Age: 61
End: 2021-04-05

## 2021-04-06 NOTE — TELEPHONE ENCOUNTER
Form has been completed and pt notified - form placed at  for . Copies made for scan and pod folder.

## 2021-04-07 ENCOUNTER — HOSPITAL ENCOUNTER (OUTPATIENT)
Dept: RADIATION ONCOLOGY | Facility: HOSPITAL | Age: 61
Discharge: HOME | End: 2021-04-07
Attending: RADIOLOGY
Payer: COMMERCIAL

## 2021-04-07 VITALS
DIASTOLIC BLOOD PRESSURE: 73 MMHG | OXYGEN SATURATION: 93 % | TEMPERATURE: 98 F | HEART RATE: 101 BPM | SYSTOLIC BLOOD PRESSURE: 114 MMHG | RESPIRATION RATE: 18 BRPM

## 2021-04-07 DIAGNOSIS — C79.51 SECONDARY MALIGNANT NEOPLASM OF BONE (HCC): Primary | ICD-10-CM

## 2021-04-07 PROCEDURE — 99213 OFFICE O/P EST LOW 20 MIN: CPT

## 2021-04-07 NOTE — PROGRESS NOTES
Nursing Follow-Up Note    Patient: Mor Moss  YOB: 1960  Age: 62year old  Radiation Oncologist: Dr. Juvenal Trujillo  Referring Physician: Melina Mcqueen  Chief Complaint: Patient presents with:   Follow - Up    Date: 4/7/2021      Vit

## 2021-04-07 NOTE — PROGRESS NOTES
[facility]  FOLLOW UP     PATIENT:   Irl Floor      10/22/1960    DIAGNOSIS:   Lung cancer      CANCER HISTORY   69-year-old man with a smoking history presented with low back pain and right LE radicular symptoms, with melanie needed    Darrick Chao MD  Radiation Oncology

## 2021-04-08 ENCOUNTER — IMMUNIZATION (OUTPATIENT)
Dept: LAB | Age: 61
End: 2021-04-08
Attending: HOSPITALIST
Payer: COMMERCIAL

## 2021-04-08 DIAGNOSIS — Z23 NEED FOR VACCINATION: Primary | ICD-10-CM

## 2021-04-08 PROCEDURE — 0002A SARSCOV2 VAC 30MCG/0.3ML IM: CPT

## 2021-04-09 DIAGNOSIS — C79.51 SECONDARY MALIGNANT NEOPLASM OF BONE (HCC): ICD-10-CM

## 2021-04-09 DIAGNOSIS — G89.3 NEOPLASM RELATED PAIN: ICD-10-CM

## 2021-04-09 RX ORDER — HYDROCODONE BITARTRATE AND ACETAMINOPHEN 10; 325 MG/1; MG/1
1-2 TABLET ORAL EVERY 6 HOURS PRN
Qty: 120 TABLET | Refills: 0 | Status: SHIPPED | OUTPATIENT
Start: 2021-04-09 | End: 2021-05-06

## 2021-04-09 RX ORDER — MORPHINE SULFATE 30 MG/1
30 TABLET, FILM COATED, EXTENDED RELEASE ORAL EVERY 12 HOURS SCHEDULED
Qty: 60 TABLET | Refills: 0 | Status: SHIPPED | OUTPATIENT
Start: 2021-04-09 | End: 2021-05-06

## 2021-04-13 ENCOUNTER — TELEPHONE (OUTPATIENT)
Dept: HEMATOLOGY/ONCOLOGY | Facility: HOSPITAL | Age: 61
End: 2021-04-13

## 2021-04-13 RX ORDER — GABAPENTIN 300 MG/1
600 CAPSULE ORAL 3 TIMES DAILY
Qty: 90 CAPSULE | Refills: 5 | Status: CANCELLED | OUTPATIENT
Start: 2021-04-13

## 2021-04-13 NOTE — TELEPHONE ENCOUNTER
Spoke with patient. Informed him he has 5 refills on file with [facility] AND [facility].  Asked patient to contact [facility] for refill request.

## 2021-04-13 NOTE — TELEPHONE ENCOUNTER
Called and talked to patient regarding apts this week. He was scheduled for smoking cessation tomorrow in [facility] and has chemo in [place] on Thursday. Offered to combine apts in [place] and pt agreed. Schedule changes made.

## 2021-04-14 ENCOUNTER — APPOINTMENT (OUTPATIENT)
Dept: HEMATOLOGY/ONCOLOGY | Facility: HOSPITAL | Age: 61
End: 2021-04-14
Attending: INTERNAL MEDICINE
Payer: COMMERCIAL

## 2021-04-15 ENCOUNTER — OFFICE VISIT (OUTPATIENT)
Dept: HEMATOLOGY/ONCOLOGY | Age: 61
End: 2021-04-15
Attending: INTERNAL MEDICINE
Payer: COMMERCIAL

## 2021-04-15 VITALS
BODY MASS INDEX: 33.65 KG/M2 | SYSTOLIC BLOOD PRESSURE: 116 MMHG | TEMPERATURE: 99 F | WEIGHT: 209.38 LBS | HEIGHT: 65.98 IN | HEART RATE: 83 BPM | OXYGEN SATURATION: 98 % | RESPIRATION RATE: 20 BRPM | DIASTOLIC BLOOD PRESSURE: 76 MMHG

## 2021-04-15 DIAGNOSIS — G89.3 NEOPLASM RELATED PAIN: Primary | ICD-10-CM

## 2021-04-15 DIAGNOSIS — D69.6 THROMBOCYTOPENIA (HCC): ICD-10-CM

## 2021-04-15 DIAGNOSIS — C34.92 PRIMARY MALIGNANT NEOPLASM OF LEFT LUNG METASTATIC TO OTHER SITE (HCC): ICD-10-CM

## 2021-04-15 DIAGNOSIS — C79.51 SECONDARY CANCER OF BONE (HCC): ICD-10-CM

## 2021-04-15 DIAGNOSIS — D63.0 ANEMIA COMPLICATING NEOPLASTIC DISEASE: ICD-10-CM

## 2021-04-15 DIAGNOSIS — C34.92 PRIMARY MALIGNANT NEOPLASM OF LEFT LUNG METASTATIC TO OTHER SITE (HCC): Primary | ICD-10-CM

## 2021-04-15 DIAGNOSIS — C79.51 SECONDARY MALIGNANT NEOPLASM OF BONE (HCC): ICD-10-CM

## 2021-04-15 DIAGNOSIS — C34.90 MALIGNANT NEOPLASM OF LUNG, UNSPECIFIED LATERALITY, UNSPECIFIED PART OF LUNG (HCC): ICD-10-CM

## 2021-04-15 PROCEDURE — 99214 OFFICE O/P EST MOD 30 MIN: CPT | Performed by: INTERNAL MEDICINE

## 2021-04-15 PROCEDURE — 96415 CHEMO IV INFUSION ADDL HR: CPT

## 2021-04-15 PROCEDURE — 96375 TX/PRO/DX INJ NEW DRUG ADDON: CPT

## 2021-04-15 PROCEDURE — 96417 CHEMO IV INFUS EACH ADDL SEQ: CPT

## 2021-04-15 PROCEDURE — 96413 CHEMO IV INFUSION 1 HR: CPT

## 2021-04-15 PROCEDURE — S0028 INJECTION, FAMOTIDINE, 20 MG: HCPCS | Performed by: INTERNAL MEDICINE

## 2021-04-15 RX ORDER — FAMOTIDINE 10 MG/ML
20 INJECTION, SOLUTION INTRAVENOUS ONCE
Status: COMPLETED | OUTPATIENT
Start: 2021-04-15 | End: 2021-04-15

## 2021-04-15 RX ORDER — DIPHENHYDRAMINE HYDROCHLORIDE 50 MG/ML
50 INJECTION INTRAMUSCULAR; INTRAVENOUS ONCE
Status: COMPLETED | OUTPATIENT
Start: 2021-04-15 | End: 2021-04-15

## 2021-04-15 RX ADMIN — FAMOTIDINE 20 MG: 10 INJECTION, SOLUTION INTRAVENOUS at 10:42:00

## 2021-04-15 RX ADMIN — DIPHENHYDRAMINE HYDROCHLORIDE 50 MG: 50 INJECTION INTRAMUSCULAR; INTRAVENOUS at 10:45:00

## 2021-04-15 NOTE — PROGRESS NOTES
Pt here for C3D1.   Arrives Via wheelchair, accompanied by Self           Pregnancy screening: Not applicable    Modifications in dose or schedule: No     Frequency of blood return and site check throughout administration: Prior to administration   Discharg

## 2021-04-15 NOTE — PROGRESS NOTES
ANP Smoking Cessation Visit    Date: [unfilled]  [facility] was referred for smoking cessation counseling. Current Tobacco Use Assessment  1. Have you smoked at least 100 cigarettes in your entire life? Yes  2.  How often do you currently smoke ciga Oral Tab CR, Take 1 tablet (30 mg total) by mouth every 12 (twelve) hours. , Disp: 60 tablet, Rfl: 0  ALBUTEROL SULFATE  (90 Base) MCG/ACT Inhalation Aero Soln, INHALE 2 PUFFS INTO THE LUNGS EVERY 4 HOURS AS NEEDED FOR WHEEZING OR SHORTNESS OF BREATH MD  pembrolizumab (KEYTRUDA) 200 mg in sodium chloride 0.9% 108 mL chemo-IVPB, 200 mg, Intravenous, Once, Markus Aviles MD  PACLitaxel (TAXOL) 420 mg in sodium chloride 0.9% 570 mL chemo-infusion, 200 mg/m2 (Treatment Plan Recorded), Intravenous, Once Height 168 cm   Weight 209 lb 6.4 oz   Weight 94.983 kg   BSA (m2) 2.04 m2   /76   Pulse 83   Resp 20   Temp 98.7   SpO2 98   Pain Score 2     Psych/Depression: mood and affect are appropriate     Impression/Plan:  Smoking Cessation   Physical: [place]

## 2021-04-15 NOTE — PROGRESS NOTES
Patient is here for MD f/u and cycle 3 of Carbo/Taxol/Keytruda. Patient c/o chronic constipation. Taking a stool softener and Senokot daily. Appetite is good. Ongoing pain in left hip. Denies cough or SOB.  Patient reports feeling sleep a lot but overall en

## 2021-04-19 PROBLEM — D69.6 THROMBOCYTOPENIA (HCC): Status: ACTIVE | Noted: 2021-04-19

## 2021-04-19 PROBLEM — D63.0 ANEMIA COMPLICATING NEOPLASTIC DISEASE: Status: ACTIVE | Noted: 2021-04-19

## 2021-04-19 NOTE — PROGRESS NOTES
[facility]    PATIENT'S NAME: Martin Sunshine   ATTENDING PHYSICIAN: Maurisio Last M.D.    PATIENT ACCOUNT #: [de-identified] LOCATION: [address] [place] [address] RECORD #: [id number] YOB: 1960   DATE OF SERVICE: 04/15/2021       CANCER GERHARD HFA inhaler 2 puffs q.4 h. p.r.n., apixaban 5 mg b.i.d., Trelegy Ellipta 1 puff daily, gabapentin 600 mg t.i.d., hydrocodone/acetaminophen 10/325 one to two tablets q.6 h. p.r.n., metoprolol tartrate 100 mg twice daily, mirtazapine 45 mg nightly, morphine pneumonitis. 2.   Anemia. It is modest and will be followed. No intervention is necessary for now. 3.   Thrombocytopenia. This is modest and will be followed. 4.   Tobacco use disorder. He is seeing our smoking cessation nurse practitioner today.

## 2021-04-22 ENCOUNTER — APPOINTMENT (OUTPATIENT)
Dept: HEMATOLOGY/ONCOLOGY | Age: 61
End: 2021-04-22
Attending: INTERNAL MEDICINE
Payer: COMMERCIAL

## 2021-04-29 NOTE — PROGRESS NOTES
Smoking Cessation Follow Up Visit    Andrade Nunez    4/29/21    S: I have not been able to make any progress, I wake up and think this will be the day and then I just can not follow through.      O:  Number of cigarettes per day at start: 20  Number of

## 2021-04-30 ENCOUNTER — TELEPHONE (OUTPATIENT)
Dept: INTERNAL MEDICINE CLINIC | Facility: CLINIC | Age: 61
End: 2021-04-30

## 2021-04-30 NOTE — TELEPHONE ENCOUNTER
Pt called to see if we recvd fax from disability. Pt stated that dr Jeremiah Valadez recvd fax. Advd that we have not recvd on our end and to have forms refaxed. Pt advd he will have dr Melissa Du office fax form to [phone].

## 2021-05-04 ENCOUNTER — TELEPHONE (OUTPATIENT)
Dept: INTERNAL MEDICINE CLINIC | Facility: CLINIC | Age: 61
End: 2021-05-04

## 2021-05-04 NOTE — TELEPHONE ENCOUNTER
Medical Records request/paperwork received from [employer]. Paperwork/request sent via  to med record dept.

## 2021-05-06 ENCOUNTER — OFFICE VISIT (OUTPATIENT)
Dept: HEMATOLOGY/ONCOLOGY | Age: 61
End: 2021-05-06
Attending: INTERNAL MEDICINE
Payer: COMMERCIAL

## 2021-05-06 VITALS
SYSTOLIC BLOOD PRESSURE: 106 MMHG | TEMPERATURE: 98 F | DIASTOLIC BLOOD PRESSURE: 68 MMHG | RESPIRATION RATE: 20 BRPM | HEART RATE: 75 BPM | WEIGHT: 209 LBS | HEIGHT: 65.98 IN | BODY MASS INDEX: 33.59 KG/M2 | OXYGEN SATURATION: 100 %

## 2021-05-06 DIAGNOSIS — G89.3 NEOPLASM RELATED PAIN: ICD-10-CM

## 2021-05-06 DIAGNOSIS — C79.51 SECONDARY MALIGNANT NEOPLASM OF BONE (HCC): ICD-10-CM

## 2021-05-06 DIAGNOSIS — C79.51 SECONDARY CANCER OF BONE (HCC): ICD-10-CM

## 2021-05-06 DIAGNOSIS — C34.92 PRIMARY MALIGNANT NEOPLASM OF LEFT LUNG METASTATIC TO OTHER SITE (HCC): Primary | ICD-10-CM

## 2021-05-06 DIAGNOSIS — D64.9 ANEMIA, NORMOCYTIC NORMOCHROMIC: Primary | ICD-10-CM

## 2021-05-06 DIAGNOSIS — C34.92 PRIMARY MALIGNANT NEOPLASM OF LEFT LUNG METASTATIC TO OTHER SITE (HCC): ICD-10-CM

## 2021-05-06 PROCEDURE — 99211 OFF/OP EST MAY X REQ PHY/QHP: CPT

## 2021-05-06 PROCEDURE — 99214 OFFICE O/P EST MOD 30 MIN: CPT | Performed by: INTERNAL MEDICINE

## 2021-05-06 RX ORDER — MORPHINE SULFATE 30 MG/1
30 TABLET, FILM COATED, EXTENDED RELEASE ORAL EVERY 12 HOURS SCHEDULED
Qty: 60 TABLET | Refills: 0 | Status: SHIPPED | OUTPATIENT
Start: 2021-05-06 | End: 2021-06-07

## 2021-05-06 RX ORDER — HYDROCODONE BITARTRATE AND ACETAMINOPHEN 10; 325 MG/1; MG/1
1-2 TABLET ORAL EVERY 6 HOURS PRN
Qty: 120 TABLET | Refills: 0 | Status: SHIPPED | OUTPATIENT
Start: 2021-05-06 | End: 2021-06-07

## 2021-05-06 NOTE — PROGRESS NOTES
No chemo today. Patient to return tomorrow for possible iron based on labs drawn today. Patient to return in 1 week for possible chemo. Reviewed appointments with patient.

## 2021-05-07 ENCOUNTER — APPOINTMENT (OUTPATIENT)
Dept: HEMATOLOGY/ONCOLOGY | Age: 61
End: 2021-05-07
Attending: INTERNAL MEDICINE
Payer: COMMERCIAL

## 2021-05-11 NOTE — PROGRESS NOTES
[facility]    PATIENT'S NAME: Ethel Augustine   ATTENDING PHYSICIAN: JETT Issa    PATIENT ACCOUNT #: [de-identified] LOCATION: [address] [place] [address] RECORD #: [id number] YOB: 1960   DATE OF SERVICE: 05/06/2021       CANCER CENTER P quetiapine fumarate 100 mg nightly, and Senna-S 1 to 2 tablets nightly p.r.n. constipation. PHYSICAL EXAMINATION:    GENERAL:  He is a well-appearing male. He is in no acute distress. VITAL SIGNS:  His performance status is 1 to 2.   His weight is 209 imaging with a CT scan after that visit. Dictated By Malina Ruiz M.D.  d: 05/10/2021 13:58:41  t: 05/11/2021 02:58:37  [facility] [id number]  /    cc: ZACK Baez M.D.

## 2021-05-13 ENCOUNTER — OFFICE VISIT (OUTPATIENT)
Dept: HEMATOLOGY/ONCOLOGY | Age: 61
End: 2021-05-13
Attending: INTERNAL MEDICINE
Payer: COMMERCIAL

## 2021-05-13 VITALS
OXYGEN SATURATION: 98 % | DIASTOLIC BLOOD PRESSURE: 57 MMHG | HEART RATE: 73 BPM | SYSTOLIC BLOOD PRESSURE: 91 MMHG | RESPIRATION RATE: 18 BRPM | BODY MASS INDEX: 32.95 KG/M2 | TEMPERATURE: 98 F | HEIGHT: 65.98 IN | WEIGHT: 205 LBS

## 2021-05-13 DIAGNOSIS — R60.0 BILATERAL LEG EDEMA: ICD-10-CM

## 2021-05-13 DIAGNOSIS — C34.92 PRIMARY MALIGNANT NEOPLASM OF LEFT LUNG METASTATIC TO OTHER SITE (HCC): ICD-10-CM

## 2021-05-13 DIAGNOSIS — C34.92 PRIMARY MALIGNANT NEOPLASM OF LEFT LUNG METASTATIC TO OTHER SITE (HCC): Primary | ICD-10-CM

## 2021-05-13 DIAGNOSIS — G89.3 NEOPLASM RELATED PAIN: ICD-10-CM

## 2021-05-13 DIAGNOSIS — K59.03 CONSTIPATION DUE TO OPIOID THERAPY: ICD-10-CM

## 2021-05-13 DIAGNOSIS — Z51.11 ENCOUNTER FOR CHEMOTHERAPY MANAGEMENT: Primary | ICD-10-CM

## 2021-05-13 DIAGNOSIS — F17.200 CURRENT EVERY DAY SMOKER: ICD-10-CM

## 2021-05-13 DIAGNOSIS — T40.2X5A CONSTIPATION DUE TO OPIOID THERAPY: ICD-10-CM

## 2021-05-13 DIAGNOSIS — D69.6 THROMBOCYTOPENIA (HCC): ICD-10-CM

## 2021-05-13 DIAGNOSIS — C79.51 SECONDARY CANCER OF BONE (HCC): ICD-10-CM

## 2021-05-13 DIAGNOSIS — T45.1X5A CHEMOTHERAPY INDUCED NEUTROPENIA (HCC): ICD-10-CM

## 2021-05-13 DIAGNOSIS — D70.1 CHEMOTHERAPY INDUCED NEUTROPENIA (HCC): ICD-10-CM

## 2021-05-13 DIAGNOSIS — Z51.12 ENCOUNTER FOR ANTINEOPLASTIC IMMUNOTHERAPY: ICD-10-CM

## 2021-05-13 DIAGNOSIS — G89.3 CHRONIC PAIN DUE TO MALIGNANT NEOPLASTIC DISEASE: ICD-10-CM

## 2021-05-13 DIAGNOSIS — D63.0 ANEMIA COMPLICATING NEOPLASTIC DISEASE: ICD-10-CM

## 2021-05-13 DIAGNOSIS — C79.51 SECONDARY MALIGNANT NEOPLASM OF BONE (HCC): ICD-10-CM

## 2021-05-13 PROCEDURE — 96375 TX/PRO/DX INJ NEW DRUG ADDON: CPT

## 2021-05-13 PROCEDURE — 99215 OFFICE O/P EST HI 40 MIN: CPT | Performed by: CLINICAL NURSE SPECIALIST

## 2021-05-13 PROCEDURE — 96415 CHEMO IV INFUSION ADDL HR: CPT

## 2021-05-13 PROCEDURE — 96413 CHEMO IV INFUSION 1 HR: CPT

## 2021-05-13 PROCEDURE — S0028 INJECTION, FAMOTIDINE, 20 MG: HCPCS | Performed by: CLINICAL NURSE SPECIALIST

## 2021-05-13 PROCEDURE — 96417 CHEMO IV INFUS EACH ADDL SEQ: CPT

## 2021-05-13 RX ORDER — HYDROCHLOROTHIAZIDE 25 MG/1
25 TABLET ORAL DAILY
Qty: 30 TABLET | Refills: 0 | Status: ON HOLD | OUTPATIENT
Start: 2021-05-13 | End: 2021-06-04

## 2021-05-13 RX ORDER — FAMOTIDINE 10 MG/ML
20 INJECTION, SOLUTION INTRAVENOUS ONCE
Status: COMPLETED | OUTPATIENT
Start: 2021-05-13 | End: 2021-05-13

## 2021-05-13 RX ORDER — DIPHENHYDRAMINE HYDROCHLORIDE 50 MG/ML
50 INJECTION INTRAMUSCULAR; INTRAVENOUS ONCE
Status: COMPLETED | OUTPATIENT
Start: 2021-05-13 | End: 2021-05-13

## 2021-05-13 RX ORDER — FAMOTIDINE 10 MG/ML
20 INJECTION, SOLUTION INTRAVENOUS ONCE
Status: CANCELLED | OUTPATIENT
Start: 2021-05-13

## 2021-05-13 RX ORDER — DIPHENHYDRAMINE HYDROCHLORIDE 50 MG/ML
50 INJECTION INTRAMUSCULAR; INTRAVENOUS ONCE
Status: CANCELLED | OUTPATIENT
Start: 2021-05-13

## 2021-05-13 RX ADMIN — FAMOTIDINE 20 MG: 10 INJECTION, SOLUTION INTRAVENOUS at 12:04:00

## 2021-05-13 RX ADMIN — DIPHENHYDRAMINE HYDROCHLORIDE 50 MG: 50 INJECTION INTRAMUSCULAR; INTRAVENOUS at 12:00:00

## 2021-05-13 NOTE — PROGRESS NOTES
MD dose reduced Taxol/Carboplatin after medication was made by pharmacy. Pt received dose reduced dose of both taxol and carboplatin. See MAR for medication documentation- order reflects changes made after release.     Taxol/Carbo/Keytruda infused per MD or

## 2021-05-13 NOTE — PROGRESS NOTES
ANP Visit Note    Patient Name: Elizabeth Daugherty   YOB: 1960   Medical Record Number: [id number]   CSN: [id number]   Date of visit: 5/13/2021   Chantal Frederick MD   No primary care provider on file.      Chief Complaint/Reason for Visit:  Marifer Carranza Former cigarette smoker    • High blood pressure    • Insomnia     Dr. Tae Juárez   • Muscle weakness    • Neuropathy    • Problems with swallowing    • Visual impairment     glasses       Surgical History:  Past Surgical History:   Procedure Laterality Date Self-Exams: Not Asked    Social History Narrative      Single, currently living with mother    Social Determinants of Health  Financial Resource Strain:       Difficulty of Paying Living Expenses:   Food Insecurity:       Worried About Running Out of Food Apply 1 Application topically 2 (two) times daily as needed. , Disp: 45 g, Rfl: 3  •  Ondansetron HCl (ZOFRAN) 8 MG tablet, Take 1 tablet (8 mg total) by mouth every 8 (eight) hours as needed for Nausea., Disp: 30 tablet, Rfl: 3  •  apixaban 5 MG Oral Tab, mg/dL    Sodium 138 136 - 145 mmol/L    Potassium 3.5 3.5 - 5.1 mmol/L    Chloride 108 98 - 112 mmol/L    CO2 24.0 21.0 - 32.0 mmol/L    Anion Gap 6 0 - 18 mmol/L    BUN 9 7 - 18 mg/dL    Creatinine 1.00 0.70 - 1.30 mg/dL    BUN/CREA Ratio 9.0 (L) 10.0 - 2 Impression/Plan:  1. Metastatic Lung Cancer: proceed with last cycle Carbo/Pem/Pem, to get CT prior to next cycle   2. Bilateral LE edema: hydrochlorothiazide daily  3. Smoker: patient is thinking about starting the patch, encouraged to do so.  We

## 2021-06-01 ENCOUNTER — HOSPITAL ENCOUNTER (OUTPATIENT)
Dept: CT IMAGING | Age: 61
Discharge: HOME | End: 2021-06-01
Attending: INTERNAL MEDICINE
Payer: COMMERCIAL

## 2021-06-01 DIAGNOSIS — C34.92 PRIMARY MALIGNANT NEOPLASM OF LEFT LUNG METASTATIC TO OTHER SITE (HCC): ICD-10-CM

## 2021-06-01 DIAGNOSIS — D64.9 ANEMIA, NORMOCYTIC NORMOCHROMIC: ICD-10-CM

## 2021-06-01 DIAGNOSIS — C79.51 SECONDARY CANCER OF BONE (HCC): ICD-10-CM

## 2021-06-01 DIAGNOSIS — C79.51 SECONDARY MALIGNANT NEOPLASM OF BONE (HCC): ICD-10-CM

## 2021-06-01 PROCEDURE — 71260 CT THORAX DX C+: CPT | Performed by: INTERNAL MEDICINE

## 2021-06-01 PROCEDURE — 74177 CT ABD & PELVIS W/CONTRAST: CPT | Performed by: INTERNAL MEDICINE

## 2021-06-02 ENCOUNTER — APPOINTMENT (OUTPATIENT)
Dept: GENERAL RADIOLOGY | Facility: HOSPITAL | Age: 61
End: 2021-06-02
Attending: EMERGENCY MEDICINE
Payer: COMMERCIAL

## 2021-06-02 ENCOUNTER — HOSPITAL ENCOUNTER (OUTPATIENT)
Facility: HOSPITAL | Age: 61
Setting detail: OBSERVATION
Discharge: HOME | End: 2021-06-04
Attending: EMERGENCY MEDICINE | Admitting: INTERNAL MEDICINE
Payer: COMMERCIAL

## 2021-06-02 DIAGNOSIS — J44.1 COPD EXACERBATION (HCC): ICD-10-CM

## 2021-06-02 DIAGNOSIS — R10.9 ABDOMINAL PAIN OF UNKNOWN ETIOLOGY: ICD-10-CM

## 2021-06-02 DIAGNOSIS — C34.90 MALIGNANT NEOPLASM OF LUNG, UNSPECIFIED LATERALITY, UNSPECIFIED PART OF LUNG (HCC): ICD-10-CM

## 2021-06-02 DIAGNOSIS — C34.90 PRIMARY MALIGNANT NEOPLASM OF LUNG METASTATIC TO OTHER SITE, UNSPECIFIED LATERALITY (HCC): ICD-10-CM

## 2021-06-02 DIAGNOSIS — R33.9 URINARY RETENTION: Primary | ICD-10-CM

## 2021-06-02 DIAGNOSIS — E87.6 HYPOKALEMIA: ICD-10-CM

## 2021-06-02 DIAGNOSIS — I48.92 ATRIAL FLUTTER, PAROXYSMAL (HCC): ICD-10-CM

## 2021-06-02 PROCEDURE — 99220 INITIAL OBSERVATION CARE,LEVL III: CPT | Performed by: INTERNAL MEDICINE

## 2021-06-02 PROCEDURE — 71045 X-RAY EXAM CHEST 1 VIEW: CPT | Performed by: EMERGENCY MEDICINE

## 2021-06-02 RX ORDER — LIDOCAINE HYDROCHLORIDE 20 MG/ML
10 JELLY TOPICAL ONCE
Status: COMPLETED | OUTPATIENT
Start: 2021-06-02 | End: 2021-06-02

## 2021-06-02 RX ORDER — ONDANSETRON 2 MG/ML
4 INJECTION INTRAMUSCULAR; INTRAVENOUS ONCE
Status: COMPLETED | OUTPATIENT
Start: 2021-06-02 | End: 2021-06-02

## 2021-06-02 RX ORDER — HYDROMORPHONE HYDROCHLORIDE 1 MG/ML
0.5 INJECTION, SOLUTION INTRAMUSCULAR; INTRAVENOUS; SUBCUTANEOUS ONCE
Status: COMPLETED | OUTPATIENT
Start: 2021-06-02 | End: 2021-06-02

## 2021-06-03 ENCOUNTER — APPOINTMENT (OUTPATIENT)
Dept: HEMATOLOGY/ONCOLOGY | Age: 61
End: 2021-06-03
Attending: INTERNAL MEDICINE
Payer: COMMERCIAL

## 2021-06-03 ENCOUNTER — APPOINTMENT (OUTPATIENT)
Dept: GENERAL RADIOLOGY | Facility: HOSPITAL | Age: 61
End: 2021-06-03
Attending: HOSPITALIST
Payer: COMMERCIAL

## 2021-06-03 ENCOUNTER — OFFICE VISIT (OUTPATIENT)
Dept: HEMATOLOGY/ONCOLOGY | Age: 61
End: 2021-06-03
Attending: INTERNAL MEDICINE
Payer: COMMERCIAL

## 2021-06-03 DIAGNOSIS — C79.51 SECONDARY CANCER OF BONE (HCC): ICD-10-CM

## 2021-06-03 DIAGNOSIS — C34.92 PRIMARY MALIGNANT NEOPLASM OF LEFT LUNG METASTATIC TO OTHER SITE (HCC): Primary | ICD-10-CM

## 2021-06-03 PROBLEM — Z79.01 ANTICOAGULATED: Status: ACTIVE | Noted: 2021-06-03

## 2021-06-03 PROBLEM — C34.90 PRIMARY MALIGNANT NEOPLASM OF LUNG METASTATIC TO OTHER SITE, UNSPECIFIED LATERALITY (HCC): Status: ACTIVE | Noted: 2021-06-03

## 2021-06-03 PROBLEM — J44.1 COPD EXACERBATION (HCC): Status: ACTIVE | Noted: 2021-06-03

## 2021-06-03 PROBLEM — R10.9 ABDOMINAL PAIN OF UNKNOWN ETIOLOGY: Status: ACTIVE | Noted: 2021-06-03

## 2021-06-03 PROBLEM — E87.6 HYPOKALEMIA: Status: ACTIVE | Noted: 2021-06-03

## 2021-06-03 PROBLEM — I48.92 ATRIAL FLUTTER, PAROXYSMAL (HCC): Status: ACTIVE | Noted: 2021-06-03

## 2021-06-03 PROCEDURE — 74018 RADEX ABDOMEN 1 VIEW: CPT | Performed by: HOSPITALIST

## 2021-06-03 PROCEDURE — 99226 SUBSEQUENT OBSERVATION CARE: CPT | Performed by: HOSPITALIST

## 2021-06-03 PROCEDURE — 99244 OFF/OP CNSLTJ NEW/EST MOD 40: CPT | Performed by: INTERNAL MEDICINE

## 2021-06-03 RX ORDER — POTASSIUM CHLORIDE 20 MEQ/1
40 TABLET, EXTENDED RELEASE ORAL EVERY 4 HOURS
Status: DISCONTINUED | OUTPATIENT
Start: 2021-06-03 | End: 2021-06-03

## 2021-06-03 RX ORDER — PROCHLORPERAZINE EDISYLATE 5 MG/ML
10 INJECTION INTRAMUSCULAR; INTRAVENOUS EVERY 6 HOURS PRN
Status: DISCONTINUED | OUTPATIENT
Start: 2021-06-03 | End: 2021-06-04

## 2021-06-03 RX ORDER — MORPHINE SULFATE 15 MG/1
30 TABLET, FILM COATED, EXTENDED RELEASE ORAL EVERY 12 HOURS SCHEDULED
Status: DISCONTINUED | OUTPATIENT
Start: 2021-06-03 | End: 2021-06-04

## 2021-06-03 RX ORDER — HYDROMORPHONE HYDROCHLORIDE 1 MG/ML
0.5 INJECTION, SOLUTION INTRAMUSCULAR; INTRAVENOUS; SUBCUTANEOUS ONCE
Status: COMPLETED | OUTPATIENT
Start: 2021-06-03 | End: 2021-06-03

## 2021-06-03 RX ORDER — POLYETHYLENE GLYCOL 3350 17 G/17G
17 POWDER, FOR SOLUTION ORAL DAILY
Status: DISCONTINUED | OUTPATIENT
Start: 2021-06-03 | End: 2021-06-04

## 2021-06-03 RX ORDER — NICOTINE 21 MG/24HR
1 PATCH, TRANSDERMAL 24 HOURS TRANSDERMAL EVERY 24 HOURS
Status: DISCONTINUED | OUTPATIENT
Start: 2021-06-03 | End: 2021-06-04

## 2021-06-03 RX ORDER — HYDROCODONE BITARTRATE AND ACETAMINOPHEN 10; 325 MG/1; MG/1
1-2 TABLET ORAL EVERY 6 HOURS PRN
Status: DISCONTINUED | OUTPATIENT
Start: 2021-06-03 | End: 2021-06-04

## 2021-06-03 RX ORDER — SODIUM CHLORIDE 9 MG/ML
INJECTION, SOLUTION INTRAVENOUS CONTINUOUS
Status: DISCONTINUED | OUTPATIENT
Start: 2021-06-03 | End: 2021-06-03

## 2021-06-03 RX ORDER — DOCUSATE SODIUM 100 MG/1
100 CAPSULE, LIQUID FILLED ORAL 2 TIMES DAILY
Status: DISCONTINUED | OUTPATIENT
Start: 2021-06-03 | End: 2021-06-04

## 2021-06-03 RX ORDER — ACETAMINOPHEN 325 MG/1
650 TABLET ORAL EVERY 6 HOURS PRN
Status: DISCONTINUED | OUTPATIENT
Start: 2021-06-03 | End: 2021-06-04

## 2021-06-03 RX ORDER — HYDROMORPHONE HYDROCHLORIDE 1 MG/ML
0.5 INJECTION, SOLUTION INTRAMUSCULAR; INTRAVENOUS; SUBCUTANEOUS EVERY 6 HOURS PRN
Status: DISCONTINUED | OUTPATIENT
Start: 2021-06-03 | End: 2021-06-03

## 2021-06-03 RX ORDER — ALBUTEROL SULFATE 90 UG/1
2 AEROSOL, METERED RESPIRATORY (INHALATION) EVERY 4 HOURS PRN
Status: DISCONTINUED | OUTPATIENT
Start: 2021-06-03 | End: 2021-06-04

## 2021-06-03 RX ORDER — HYDROMORPHONE HYDROCHLORIDE 1 MG/ML
0.5 INJECTION, SOLUTION INTRAMUSCULAR; INTRAVENOUS; SUBCUTANEOUS
Status: DISCONTINUED | OUTPATIENT
Start: 2021-06-03 | End: 2021-06-04

## 2021-06-03 RX ORDER — MIRTAZAPINE 15 MG/1
45 TABLET, FILM COATED ORAL NIGHTLY
Status: DISCONTINUED | OUTPATIENT
Start: 2021-06-03 | End: 2021-06-04

## 2021-06-03 RX ORDER — CIPROFLOXACIN 500 MG/1
500 TABLET, FILM COATED ORAL EVERY 12 HOURS SCHEDULED
Status: DISCONTINUED | OUTPATIENT
Start: 2021-06-03 | End: 2021-06-04

## 2021-06-03 RX ORDER — SODIUM CHLORIDE 9 MG/ML
INJECTION, SOLUTION INTRAVENOUS CONTINUOUS
Status: DISCONTINUED | OUTPATIENT
Start: 2021-06-03 | End: 2021-06-04

## 2021-06-03 RX ORDER — ONDANSETRON 2 MG/ML
4 INJECTION INTRAMUSCULAR; INTRAVENOUS EVERY 4 HOURS PRN
Status: DISCONTINUED | OUTPATIENT
Start: 2021-06-03 | End: 2021-06-03

## 2021-06-03 RX ORDER — METOPROLOL TARTRATE 100 MG/1
100 TABLET ORAL
Status: DISCONTINUED | OUTPATIENT
Start: 2021-06-03 | End: 2021-06-04

## 2021-06-03 RX ORDER — GABAPENTIN 300 MG/1
600 CAPSULE ORAL 3 TIMES DAILY
Status: DISCONTINUED | OUTPATIENT
Start: 2021-06-03 | End: 2021-06-04

## 2021-06-03 RX ORDER — HYDROMORPHONE HYDROCHLORIDE 1 MG/ML
0.5 INJECTION, SOLUTION INTRAMUSCULAR; INTRAVENOUS; SUBCUTANEOUS
Status: DISCONTINUED | OUTPATIENT
Start: 2021-06-03 | End: 2021-06-03

## 2021-06-03 RX ORDER — HYDROMORPHONE HYDROCHLORIDE 1 MG/ML
1 INJECTION, SOLUTION INTRAMUSCULAR; INTRAVENOUS; SUBCUTANEOUS EVERY 6 HOURS PRN
Status: DISCONTINUED | OUTPATIENT
Start: 2021-06-03 | End: 2021-06-04

## 2021-06-03 RX ORDER — MAGNESIUM OXIDE 400 MG (241.3 MG MAGNESIUM) TABLET
400 TABLET ONCE
Status: COMPLETED | OUTPATIENT
Start: 2021-06-03 | End: 2021-06-03

## 2021-06-03 RX ORDER — ONDANSETRON 2 MG/ML
4 INJECTION INTRAMUSCULAR; INTRAVENOUS EVERY 6 HOURS PRN
Status: DISCONTINUED | OUTPATIENT
Start: 2021-06-03 | End: 2021-06-04

## 2021-06-03 RX ORDER — QUETIAPINE 25 MG/1
100 TABLET, FILM COATED ORAL NIGHTLY
Status: DISCONTINUED | OUTPATIENT
Start: 2021-06-03 | End: 2021-06-04

## 2021-06-03 NOTE — CONSULTS
[facility] Urology   Consultation Note    Logantonmary Mercer Patient Status:  Observation    10/22/1960 MRN [id number]   [facility] 7NE-A Attending Lashawn Virgen MD   Hosp Day # 0 PCP Yara Rice MD     Reason for Consultation: Clots) Other         Aunt, Family history of problem with noble nettles       reports that he has been smoking cigarettes. He started smoking about 40 years ago. He has a 45.00 pack-year smoking history.  He has never used smokeless tobacco. He reports caleb mg, 400 mg, Oral, Once    Review of Systems:  Pertinent items are noted in HPI.     Physical Exam:  /71 (BP Location: Left arm)   Pulse 110   Temp 97.7 °F (36.5 °C) (Oral)   Resp 19   Ht 5' 6\" (1.676 m)   Wt 189 lb 4.8 oz (85.9 kg)   SpO2 94%   BMI 3 right upper lobe.  Stable 4 mm nodule in the right upper lobe on image 44. Stable 5 mm right lower lobe nodule on image 77.  Previously demonstrated right lower lobe nodule has resolved.    Upper lobe predominant emphysematous changes are again demonstrated extending above the level of the umbilicus with craniocaudal dimension of 22 cm.  No mass or wall thickening.     PELVIC NODES:  No adenopathy.     PELVIC ORGANS:  No visible mass.  Pelvic organs appropriate for patient age.     BONES:  A lytic lesion prev Hypercalcemia     Malignant neoplasm of lung ([facility]     Primary malignant neoplasm of lung metastatic to other site [facility])     Mixed simple and mucopurulent chronic bronchitis (HCC)     Anemia complicating neoplastic disease     Thrombocytopenia ([facility]     Ur

## 2021-06-03 NOTE — CONSULTS
[facility]  Report of Consultation    Blane Dorman Patient Status:  Observation    10/22/1960 MRN [id number]   [facility] 7NE-A Attending Macario Rosenbaum MD   Hosp Day # 0 PCP Dennise Zuluaga MD     Reason for Consultation:     Throm Clots) Other         Aunt, Family history of problem with noble nettles       reports that he has been smoking cigarettes. He started smoking about 40 years ago. He has a 45.00 pack-year smoking history.  He has never used smokeless tobacco. He reports caleb 114/65 (BP Location: Left arm)   Pulse 101   Temp 98.7 °F (37.1 °C) (Oral)   Resp 18   Ht 1.676 m (5' 6\")   Wt 85.9 kg (189 lb 4.8 oz)   SpO2 95%   BMI 30.55 kg/m²    General: Patient is alert and oriented x 3, not in acute distress.     HEENT: EOMs intact 10.1 mg/dL    Calculated Osmolality 278 275 - 295 mOsm/kg    GFR, Non- 73 >=60    GFR, -American 84 >=60    AST 14 (L) 15 - 37 U/L    ALT 14 (L) 16 - 61 U/L    Alkaline Phosphatase 68 45 - 117 U/L    Bilirubin, Total 1.2 0.1 - 2.0 mg Collection Time: 06/02/21 10:51 PM   Result Value Ref Range    RBC Morphology See morphology below (A) Normal, Slide reviewed, see previous RBC morphology.     Platelet Morphology Normal Normal    Macrocytosis 2+ (A)     Lactic Acid 3 Hr Post Positive SCAN (ONCOLOGY) (CPT=78815), 3/08/2021, 12:13 PM.  [facility] , CT, CTA CHEST + CT ABD (W) + CT PEL (W) SH(CPT=71275/87729), 3/03/2021, 2:57 PM.  [facility] , CT, CT CHEST PE AORTA (IV ONLY) (CPT=71260), 3/27/2021,   12:52 PM.       INDICATIONS:  C79.51 Secondary  Previously demonstrated left supraclavicular lymph node has completely resolved.  Posterior mediastinal lymph node to the right of    the upper thoracic esophagus measures 7 x 5 mm on image 20, previously 11 x 6 mm.    EMY:  No mass or adenopathy.     CAR superior acetabular which are not   significantly in size.  Other PET positive lesions show no CT correlate.  Subtle radiolucent lesion of the right sacrum is not significantly changed.  Stable degenerative changes of spine.                        Impressi pain of unknown etiology     Primary malignant neoplasm of lung metastatic to other site, unspecified laterality (HCC)     COPD exacerbation (HCC)     Hypokalemia     Atrial flutter, paroxysmal (HCC)    1)  Thrombocytopenia - due to prior chemo.   Should be

## 2021-06-03 NOTE — ED PROVIDER NOTES
Patient Seen in: [facility] Emergency Department      History   Patient presents with:  Abdomen/Flank Pain  Nausea/Vomiting/Diarrhea  Cough/URI    Stated Complaint: abd pain, constipation/diarrhea, dry cough. . had CT with barrium contrast yeste* • TONSILLECTOMY      as a child                No pertinent social history. Review of Systems    Positive for stated complaint: abd pain, constipation/diarrhea, dry cough. . had CT with barrium contrast yeste*  Other systems are as noted in HPI (*)     Calcium, Total 10.8 (*)     AST 14 (*)     ALT 14 (*)     All other components within normal limits   LACTIC ACID, PLASMA - Abnormal; Notable for the following components:    Lactic Acid 2.4 (*)     All other components within normal limits   RBC M Finalized by (CST): Cliff Stearns MD on 6/02/2021 at 10:34 PM             Patient was brought back to the examination room immediately. The patient was then placed on a cardiac monitor and pulse oximetry was applied.   Patient had an IV established and labs lobe nodules.           Dictated by (CST): Marta Ochoa MD on 6/01/2021 at 10:16 AM       Finalized by (CST): Marta Ochoa MD on 6/01/2021 at 11:12 AM               MDM      This is a 50-year-old male with a history of metastatic lung cancer, COPD, histo Date Reviewed: 5/13/2021        ICD-10-CM Noted POA    * (Principal) Urinary retention R33.9 6/2/2021 Unknown

## 2021-06-03 NOTE — PROGRESS NOTES
[facility]  Progress Note     Keyana Erikeula Patient Status:  Observation    10/22/1960 MRN [id number]   [facility] 7NE-A Attending Alexia Curling, MD   Hosp Day # 0 PCP Subha Armas MD     Chief Complaint: Urinary retention COVID19 Not Detected 03/03/2021       Pro-Calcitonin  No results for input(s): PCT in the last 168 hours. Cardiac  Recent Labs   Lab 06/02/21  2251   TROP <0.045       Creatinine Kinase  No results for input(s): CK in the last 168 hours.     Inflammat patient be referred to TCC on discharge?: No  Estimated date of discharge: Tomorrow? Discharge is dependent on: Clinical course  At this point Mr. Kanwal Chirinos is expected to be discharge to: Home    Plan of care discussed with patient and RN.     Charles Coronel

## 2021-06-03 NOTE — PLAN OF CARE
NURSING ADMISSION NOTE    Pt admitted from ED to room 3753, dx: urinary retention. Patient admitted via Cart. Oriented to room. Safety precautions initiated. Bed in low position. Call light in reach. Admission navigator completed. Pt a/ox4.  VS

## 2021-06-03 NOTE — PHYSICAL THERAPY NOTE
PHYSICAL THERAPY QUICK EVALUATION - INPATIENT    Room Number: 1971/7006-N  Evaluation Date: 6/3/2021  Presenting Problem: urinary retention  Physician Order: PT Eval and Treat    History related to current admission: Patient is a 62year old male admitte with his mother who is his caregiver and assists as needed. He ambulates with a straight cane and does limited driving. Pt sleeps in a recliner. Pt is mostly sponge bathing.      SUBJECTIVE  \"I want to try to use the RW to see what it is like if that's oka Assessment  Gait Assistance: Contact guard assist;Supervision  Distance (ft): 200, 200  Assistive Device: Rolling walker;Cane  Pattern:  (wide OUMOU, quick palomo)  Stoop/Curb Assistance: Not tested  Comment : When ambulating with RW pt had increased diffic listed above. Functional outcome measures completed include AMPAC with a score of 41.77% impairment. Based on this evaluation, patient's clinical presentation is evolving and overall evaluation complexity is considered moderate.   PT Discharge Recommendat

## 2021-06-03 NOTE — PLAN OF CARE
Assumed care at 0700  A&O x 4.  Very pleasant  Urine sample sent  foely care done  Dilaudid given for pain in abdomen  Laxatives given, still need stool sample  Up x1 with cane  K and mg replaced  Watch overnight, possible dc in am    Meds given per orders

## 2021-06-03 NOTE — ED INITIAL ASSESSMENT (HPI)
60YM c/c of abd pain pt state that since last night he been having generalized abd pain with NVD.  Pt state that he has a hx of lung ca with mets

## 2021-06-04 VITALS
HEART RATE: 69 BPM | WEIGHT: 189.31 LBS | BODY MASS INDEX: 30.42 KG/M2 | RESPIRATION RATE: 17 BRPM | TEMPERATURE: 98 F | HEIGHT: 66 IN | OXYGEN SATURATION: 97 % | DIASTOLIC BLOOD PRESSURE: 48 MMHG | SYSTOLIC BLOOD PRESSURE: 98 MMHG

## 2021-06-04 PROCEDURE — 99225 SUBSEQUENT OBSERVATION CARE: CPT | Performed by: INTERNAL MEDICINE

## 2021-06-04 PROCEDURE — 99217 OBSERVATION CARE DISCHARGE: CPT | Performed by: HOSPITALIST

## 2021-06-04 RX ORDER — POTASSIUM CHLORIDE 20 MEQ/1
40 TABLET, EXTENDED RELEASE ORAL ONCE
Status: COMPLETED | OUTPATIENT
Start: 2021-06-04 | End: 2021-06-04

## 2021-06-04 RX ORDER — CIPROFLOXACIN 500 MG/1
500 TABLET, FILM COATED ORAL EVERY 12 HOURS SCHEDULED
Qty: 20 TABLET | Refills: 0 | Status: SHIPPED | OUTPATIENT
Start: 2021-06-04 | End: 2021-07-01 | Stop reason: ALTCHOICE

## 2021-06-04 RX ORDER — SENNA AND DOCUSATE SODIUM 50; 8.6 MG/1; MG/1
1 TABLET, FILM COATED ORAL 2 TIMES DAILY
Qty: 60 TABLET | Refills: 2 | Status: SHIPPED | COMMUNITY
Start: 2021-06-04 | End: 2021-09-14

## 2021-06-04 RX ORDER — MAGNESIUM OXIDE 400 MG (241.3 MG MAGNESIUM) TABLET
400 TABLET ONCE
Status: COMPLETED | OUTPATIENT
Start: 2021-06-04 | End: 2021-06-04

## 2021-06-04 NOTE — PROGRESS NOTES
Heme/Onc Progress Note    Patient Name: Rafita Sanchez   YOB: 1960   Medical Record Number: [id number]   CSN: [id number]   Attending Physician: Ely Griffiths M.D. Subjective:  Feels much better. HR lower.   Still no BM but having \" patch, 1 patch, Transdermal, Q24H  •  QUEtiapine Fumarate (SEROQUEL) tab 100 mg, 100 mg, Oral, Nightly  •  docusate sodium (COLACE) cap 100 mg, 100 mg, Oral, BID  •  PEG 3350 (MIRALAX) powder packet 17 g, 17 g, Oral, Daily  •  Ciprofloxacin HCl (CIPRO) tab Potassium    Collection Time: 06/03/21  8:21 PM   Result Value Ref Range    Potassium 3.4 (L) 3.5 - 5.1 mmol/L   Basic Metabolic Panel (8)    Collection Time: 06/04/21  5:40 AM   Result Value Ref Range    Glucose 80 70 - 99 mg/dL    Sodium 139 136 - 145 HISTORY: (As transcribed by Technologist)  Patient offered no additional history at this time.            FINDINGS:     BOWEL GAS PATTERN:  Mild gaseous distention of the stomach.  No abnormal dilation or deviation.     CALCIFICATIONS:  None significant.

## 2021-06-04 NOTE — PLAN OF CARE
Assumed care of patient @ 0730. A&Ox4. RA. NSR. BP maintained under parameters. Patient denies any pain. Patient tolerating diet. PO abx given. Good UO. Tirado per Urology. Plan to dc with Tirado. Plan for voiding trial OP. HGB 7.3.  Hematology a

## 2021-06-04 NOTE — DISCHARGE PLANNING
NURSING DISCHARGE NOTE    Discharged Home via Wheelchair. Accompanied by Family member  Belongings Taken by patient/family. Patient okay to dc from all physicians. AV'S paper reviewed. Discharge paper reviewed.  Medication changes, purpose, and side

## 2021-06-04 NOTE — DISCHARGE SUMMARY
[facility] HOSPITALIST  DISCHARGE SUMMARY     [facility] Patient Status:  Observation    10/22/1960 MRN [id number]   [facility] 7NE-A Attending Prudencio Buerger, MD   Hosp Day # 0 PCP Olga Georges MD     Date of Admission: 2021  Date sweat.  Denies any fever. Generalized abdominal pain constant since it started. In the emergency room patient was found to have urinary retention and Tiardo was placed while in the emergency room.   Patient also had low potassium of 2.4 and found to be in a follow-up with Dr. Lisa Aase for repeat labs and resume when cleared to do so.    Quantity: 60 tablet  Refills: 3     Senna-Docusate Sodium 8.6-50 MG Tabs  Commonly known as: SENOKOT S  What changed:   · how much to take  · how to take this  · when to take [employer] QUEtiapine Fumarate 100 MG Tabs  Commonly known as: SEROQUEL      Take 1 tablet (100 mg total) by mouth nightly.    Quantity: 90 tablet  Refills: 3     Trelegy Ellipta 200-62.5-25 MCG/INH Aepb  Generic drug: fluticasone-Umeclidin-Vilant      Inhale 1 puff Visitors** Since the safety and protection of patients, staff, physicians and community is an absolute priority for [place] [address], we have put the following visitor restrictions in place during the Coronavirus outbreak.   -Visitors 18 years and und [facility] door and picked up afterward, unless patient assistance is required. -Staff will be available to escort patients inside as needed. Family/friends will be notified when your loved one is ready for pick-up. Thank you for your understanding.

## 2021-06-04 NOTE — PLAN OF CARE
Assumed care at [facility]. Pt a/ox4. VSS. NSR per tele. RA. Pain controlled w/ scheduled po morphine. IVF infusing per order. K replaced per protocol. Tirado catheter intact. Pt updated w/ POC. Needs attended to. Will continue to monitor.

## 2021-06-04 NOTE — PROGRESS NOTES
[facility] HOSPITALIST  Progress Note     Roberto Arechiga Patient Status:  Observation    10/22/1960 MRN [id number]   [facility] 7NE-A Attending Anurag Escoto MD   Hosp Day # 0 PCP Ana Perera MD     Chief Complaint: Urinary retention Estimated Creatinine Clearance: 88.6 mL/min (based on SCr of 0.81 mg/dL). No results for input(s): PTP, INR in the last 168 hours.          COVID-19 Lab Results    COVID-19  Lab Results   Component Value Date    COVID19 Not Detected 06/02/2021    C resolved  6. Hypokalemia  7. Hypercalcemia, resolved   1. Stop IVF  2. Monitor UOP, creatinine and electrolytes  8. Anemia  9. Thrombocytopenia  1. Monitor   10. Metastatic lung cancer  1. Oncology consult  11. Chronic pain  1. Continue PTA regimen   2.  Juan Ballard

## 2021-06-07 ENCOUNTER — OFFICE VISIT (OUTPATIENT)
Dept: HEMATOLOGY/ONCOLOGY | Age: 61
End: 2021-06-07
Attending: INTERNAL MEDICINE
Payer: COMMERCIAL

## 2021-06-07 VITALS
HEART RATE: 73 BPM | DIASTOLIC BLOOD PRESSURE: 64 MMHG | BODY MASS INDEX: 31.34 KG/M2 | HEIGHT: 65.98 IN | RESPIRATION RATE: 18 BRPM | SYSTOLIC BLOOD PRESSURE: 93 MMHG | WEIGHT: 195 LBS | OXYGEN SATURATION: 98 % | TEMPERATURE: 98 F

## 2021-06-07 DIAGNOSIS — C34.90 MALIGNANT NEOPLASM OF LUNG, UNSPECIFIED LATERALITY, UNSPECIFIED PART OF LUNG (HCC): Primary | ICD-10-CM

## 2021-06-07 DIAGNOSIS — Z51.11 ENCOUNTER FOR CHEMOTHERAPY MANAGEMENT: ICD-10-CM

## 2021-06-07 DIAGNOSIS — C79.51 SECONDARY CANCER OF BONE (HCC): ICD-10-CM

## 2021-06-07 DIAGNOSIS — C34.92 PRIMARY MALIGNANT NEOPLASM OF LEFT LUNG METASTATIC TO OTHER SITE (HCC): ICD-10-CM

## 2021-06-07 DIAGNOSIS — G89.3 NEOPLASM RELATED PAIN: ICD-10-CM

## 2021-06-07 DIAGNOSIS — R33.9 URINARY RETENTION: Primary | ICD-10-CM

## 2021-06-07 DIAGNOSIS — C79.51 SECONDARY MALIGNANT NEOPLASM OF BONE (HCC): ICD-10-CM

## 2021-06-07 PROCEDURE — 96413 CHEMO IV INFUSION 1 HR: CPT

## 2021-06-07 PROCEDURE — 99215 OFFICE O/P EST HI 40 MIN: CPT | Performed by: CLINICAL NURSE SPECIALIST

## 2021-06-07 RX ORDER — HYDROCODONE BITARTRATE AND ACETAMINOPHEN 10; 325 MG/1; MG/1
1-2 TABLET ORAL EVERY 6 HOURS PRN
Qty: 120 TABLET | Refills: 0 | Status: SHIPPED | OUTPATIENT
Start: 2021-06-07 | End: 2021-07-01

## 2021-06-07 RX ORDER — MAGNESIUM CARB/ALUMINUM HYDROX 105-160MG
296 TABLET,CHEWABLE ORAL ONCE
Qty: 296 ML | Refills: 3 | Status: SHIPPED | OUTPATIENT
Start: 2021-06-07 | End: 2021-06-07

## 2021-06-07 RX ORDER — MIRTAZAPINE 45 MG/1
45 TABLET, FILM COATED ORAL NIGHTLY
Qty: 90 TABLET | Refills: 3 | Status: SHIPPED | OUTPATIENT
Start: 2021-06-07

## 2021-06-07 RX ORDER — SENNA AND DOCUSATE SODIUM 50; 8.6 MG/1; MG/1
2 TABLET, FILM COATED ORAL 2 TIMES DAILY
Qty: 120 TABLET | Refills: 0 | Status: SHIPPED | OUTPATIENT
Start: 2021-06-07 | End: 2021-06-08

## 2021-06-07 RX ORDER — MORPHINE SULFATE 30 MG/1
30 TABLET, FILM COATED, EXTENDED RELEASE ORAL EVERY 12 HOURS SCHEDULED
Qty: 60 TABLET | Refills: 0 | Status: SHIPPED | OUTPATIENT
Start: 2021-06-07 | End: 2021-07-01

## 2021-06-07 NOTE — PROGRESS NOTES
ANP Visit Note    Patient Name: Elizabeth Daugherty   YOB: 1960   Medical Record Number: [id number]   CSN: [id number]   Date of visit: 6/7/2021   Chantal Frederick MD   No primary care provider on file.      Chief Complaint/Reason for Visit:  Angi Nascimento History:  Past Medical History:   Diagnosis Date   • Anxiety     Dr. Kadeem Billingsley   • Back problem    • COPD (chronic obstructive pulmonary disease) ([facility] [address]    • Depression     Dr. Kadeem Billingsley   • Essential hypertension, benign    • Former cigarette smoker    • High bl Diet: Not Asked        Stress Concern: Not Asked        Weight Concern: Not Asked         Service: Not Asked        Blood Transfusions: Not Asked        Occupational Exposure: Not Asked        Hobby Hazards: Not Asked        Sleep Concern: Not Aske Disp: 90 tablet, Rfl: 3  •  apixaban 5 MG Oral Tab, Take 1 tablet (5 mg total) by mouth 2 (two) times daily.  Hold Eliquis on discharge, to follow-up with Dr. Timothy Goldberg for repeat labs and resume when cleared to do so., Disp: 60 tablet, Rfl: 3  •  Senna-Docusa acute distress.   Vital Signs: BP 93/64 (BP Location: Right arm, Patient Position: Sitting, Cuff Size: large)   Pulse 73   Temp 97.6 °F (36.4 °C) (Tympanic)   Resp 18   Ht 1.676 m (5' 5.98\")   Wt 88.5 kg (195 lb)   SpO2 98%   BMI 31.49 kg/m²   HEENT: EOMs 31.0 - 37.0 g/dL    RDW 19.2 (H) 11.0 - 15.0 %    RDW-SD 75.5 (H) 35.1 - 46.3 fL    Neutrophil Absolute Prelim 3.19 1.50 - 7.70 x10 (3) uL    Neutrophil Absolute 3.19 1.50 - 7.70 x10(3) uL    Lymphocyte Absolute 1.61 1.00 - 4.00 x10(3) uL    Monocyte Absol

## 2021-06-07 NOTE — PROGRESS NOTES
Outpatient Oncology Care Plan   Problem list:   knowledge deficit   Problems related to:   self care   Interventions:   provided general teaching   Expected outcomes:   understands plan of care   Progress towards outcome: making progress     Education Tevin

## 2021-06-08 ENCOUNTER — PATIENT OUTREACH (OUTPATIENT)
Dept: CASE MANAGEMENT | Age: 61
End: 2021-06-08

## 2021-06-08 ENCOUNTER — OFFICE VISIT (OUTPATIENT)
Dept: INTERNAL MEDICINE CLINIC | Facility: CLINIC | Age: 61
End: 2021-06-08
Payer: COMMERCIAL

## 2021-06-08 VITALS
RESPIRATION RATE: 16 BRPM | OXYGEN SATURATION: 96 % | SYSTOLIC BLOOD PRESSURE: 80 MMHG | BODY MASS INDEX: 32.11 KG/M2 | HEART RATE: 76 BPM | WEIGHT: 199.81 LBS | DIASTOLIC BLOOD PRESSURE: 50 MMHG | HEIGHT: 65.98 IN | TEMPERATURE: 98 F

## 2021-06-08 DIAGNOSIS — I48.92 ATRIAL FLUTTER, PAROXYSMAL (HCC): ICD-10-CM

## 2021-06-08 DIAGNOSIS — R33.9 URINARY RETENTION: Primary | ICD-10-CM

## 2021-06-08 PROBLEM — M54.50 ACUTE MIDLINE LOW BACK PAIN, UNSPECIFIED WHETHER SCIATICA PRESENT: Chronic | Status: ACTIVE | Noted: 2021-01-08

## 2021-06-08 PROBLEM — E87.6 HYPOKALEMIA: Status: RESOLVED | Noted: 2021-06-03 | Resolved: 2021-06-08

## 2021-06-08 PROBLEM — J41.8 MIXED SIMPLE AND MUCOPURULENT CHRONIC BRONCHITIS (HCC): Status: RESOLVED | Noted: 2021-03-23 | Resolved: 2021-06-08

## 2021-06-08 PROBLEM — R10.9 ABDOMINAL PAIN OF UNKNOWN ETIOLOGY: Status: RESOLVED | Noted: 2021-06-03 | Resolved: 2021-06-08

## 2021-06-08 PROBLEM — R45.851 SUICIDAL IDEATION: Chronic | Status: ACTIVE | Noted: 2021-03-03

## 2021-06-08 PROBLEM — D69.6 THROMBOCYTOPENIA (HCC): Chronic | Status: ACTIVE | Noted: 2021-04-19

## 2021-06-08 PROCEDURE — 3008F BODY MASS INDEX DOCD: CPT | Performed by: INTERNAL MEDICINE

## 2021-06-08 PROCEDURE — 3078F DIAST BP <80 MM HG: CPT | Performed by: INTERNAL MEDICINE

## 2021-06-08 PROCEDURE — 3074F SYST BP LT 130 MM HG: CPT | Performed by: INTERNAL MEDICINE

## 2021-06-08 PROCEDURE — 99495 TRANSJ CARE MGMT MOD F2F 14D: CPT | Performed by: INTERNAL MEDICINE

## 2021-06-08 RX ORDER — METOPROLOL TARTRATE 50 MG/1
50 TABLET, FILM COATED ORAL
COMMUNITY
Start: 2021-06-08 | End: 2021-07-22 | Stop reason: DRUGHIGH

## 2021-06-08 NOTE — PROGRESS NOTES
Patient LVM requesting assistance reschedule apt    Jevon Orozco [address], [address]  [phone]  Apt made for Fri. July 2nd @10:30am

## 2021-06-08 NOTE — PROGRESS NOTES
Shaheed GARCIA 10/22/1960 is a 62year old male. Patient presents with:  ER F/U       HPI:   post hospital follow-up. Requesting referral for urology secondary to urinary retention.   Also was supposed to see the cardiologist.  Current Outpatient Anxiety     Dr. Robin Verdin   • Back problem    • COPD (chronic obstructive pulmonary disease) ([address]    • Depression     Dr. Robin Verdin   • Essential hypertension, benign    • Former cigarette smoker    • High blood pressure    • Insomnia     Dr. Robin Verdin   • [facility] orders for this visit:    Urinary retention  -     UROLOGY - INTERNAL    Atrial flutter, paroxysmal ([facility] [address]  -     CARDIO - INTERNAL    Problem list updated meds verified. There are no Patient Instructions on file for this visit.      The patient indicates

## 2021-06-22 ENCOUNTER — OFFICE VISIT (OUTPATIENT)
Dept: SURGERY | Facility: CLINIC | Age: 61
End: 2021-06-22
Payer: COMMERCIAL

## 2021-06-22 DIAGNOSIS — R33.9 URINARY RETENTION: Primary | ICD-10-CM

## 2021-06-22 DIAGNOSIS — C34.92 MALIGNANT NEOPLASM OF LEFT LUNG, UNSPECIFIED PART OF LUNG (HCC): ICD-10-CM

## 2021-06-22 DIAGNOSIS — K59.03 DRUG-INDUCED CONSTIPATION: ICD-10-CM

## 2021-06-22 DIAGNOSIS — C34.92 PRIMARY MALIGNANT NEOPLASM OF LEFT LUNG METASTATIC TO OTHER SITE (HCC): ICD-10-CM

## 2021-06-22 DIAGNOSIS — N40.0 ENLARGED PROSTATE: ICD-10-CM

## 2021-06-22 PROCEDURE — 99203 OFFICE O/P NEW LOW 30 MIN: CPT | Performed by: UROLOGY

## 2021-06-22 RX ORDER — TAMSULOSIN HYDROCHLORIDE 0.4 MG/1
0.4 CAPSULE ORAL DAILY
Qty: 90 CAPSULE | Refills: 3 | Status: SHIPPED | OUTPATIENT
Start: 2021-06-22 | End: 2021-07-22

## 2021-06-22 NOTE — PROGRESS NOTES
Rooming Clinician:     Angelia Smith is a 62year old male. No chief complaint on file. HPI:     Patient comes to the office with a history of recent episode of urinary retention and constipation.   He was seen in the emergency department and 1 L Application topically 2 (two) times daily as needed. (Patient not taking: Reported on 6/8/2021 ) 45 g 3   • Ondansetron HCl (ZOFRAN) 8 MG tablet Take 1 tablet (8 mg total) by mouth every 8 (eight) hours as needed for Nausea.  30 tablet 3   • QUEtiapine Fuma lesions  HEENT: atraumatic, normocephalic,ears and throat are clear  NECK: supple  LUNGS: normal respiratory motion without distress  CARDIO: normal peripheral perfusion  ABDOMEN: no masses,  tenderness, or hernia  : The penis is circumcisedd.   Urethral CHEST+ABDOMEN+PELVIS(ALL CNTRST ONLY)(CPT=71260/46780)  COMPARISON:  [facility], PET/CT STANDARD BODY SCAN (ONCOLOGY) (VOF=84469), 3/08/2021, 12:13 PM.  [facility] , CT, CTA CHEST + CT ABD (W) + CT PEL (W) SH(CPT=71275/32816), 3/03/2021, 2:57 PM.  [facility] , CT of series 2 previously measured 1.4 x 1.1 cm. Previously demonstrated left supraclavicular lymph node has completely resolved.   Posterior mediastinal lymph node to the right of  the upper thoracic esophagus measures 7 x 5 mm on image 20, previously 11 x 6 acetabular which are not significantly in size. Other PET positive lesions show no CT correlate. Subtle radiolucent lesion of the right sacrum is not significantly changed. Stable degenerative changes of spine. CONCLUSION:  1.  Decrease in siz

## 2021-06-22 NOTE — PATIENT INSTRUCTIONS
On behalf of myself and care team, I would like to thank you for entrusting us with your care today. It is our goal to provide you and your family with excellent care.   Please note that you may receive a survey in the mail; any feedback you have for this know how this medicine will affect you. Please check with your doctor before drinking alcohol while on this medicine. Ask your pharmacist if this medicine can interact with any of your other medicines.  Be sure to tell them about all the medicines you diana pharmacist.   © 2021 [address]

## 2021-06-24 ENCOUNTER — OFFICE VISIT (OUTPATIENT)
Dept: SURGERY | Facility: CLINIC | Age: 61
End: 2021-06-24
Payer: COMMERCIAL

## 2021-06-24 ENCOUNTER — NURSE ONLY (OUTPATIENT)
Dept: SURGERY | Facility: CLINIC | Age: 61
End: 2021-06-24
Payer: COMMERCIAL

## 2021-06-24 DIAGNOSIS — C34.92 MALIGNANT NEOPLASM OF LEFT LUNG, UNSPECIFIED PART OF LUNG (HCC): ICD-10-CM

## 2021-06-24 DIAGNOSIS — K59.03 DRUG-INDUCED CONSTIPATION: ICD-10-CM

## 2021-06-24 DIAGNOSIS — R33.9 URINARY RETENTION: Primary | ICD-10-CM

## 2021-06-24 DIAGNOSIS — R33.9 URINE RETENTION: Primary | ICD-10-CM

## 2021-06-24 DIAGNOSIS — N40.0 ENLARGED PROSTATE: ICD-10-CM

## 2021-06-24 PROCEDURE — 99213 OFFICE O/P EST LOW 20 MIN: CPT | Performed by: UROLOGY

## 2021-06-24 PROCEDURE — 51798 US URINE CAPACITY MEASURE: CPT | Performed by: UROLOGY

## 2021-06-24 RX ORDER — SULFAMETHOXAZOLE AND TRIMETHOPRIM 800; 160 MG/1; MG/1
1 TABLET ORAL 2 TIMES DAILY
Qty: 14 TABLET | Refills: 0 | Status: SHIPPED | OUTPATIENT
Start: 2021-06-24 | End: 2021-07-01

## 2021-06-24 NOTE — PROGRESS NOTES
RN received patient and here for PVR. PVR shows 223ml. MD made aware. Dr Shayy Lopez seen patient. Patient verbalized being comfortable and prefer not to have the Tirado. Form given to use to go to ED if needed. MD reviewed the Urine culture.  Order for Bactrim

## 2021-06-24 NOTE — PROGRESS NOTES
Pt verified name and . Pt placed in supine position. Using aseptic technique the balloon was deflated and pham removed. Pt tolerated procedure well and has appt scheduled to see RP this afternoon.

## 2021-06-24 NOTE — PROGRESS NOTES
Rooming Clinician:     Roberto Arechiga is a 62year old male. No chief complaint on file. HPI:     Patient has voided several times after removal of the catheter. He feels comfortable. Residuals 233 cc. He did have bowel movement the other day. 1 tablet (8 mg total) by mouth every 8 (eight) hours as needed for Nausea. 30 tablet 3   • QUEtiapine Fumarate 100 MG Oral Tab Take 1 tablet (100 mg total) by mouth nightly.  90 tablet 3       Pcn [Penicillins]           Comment:Allergy as a child   Past Me peripheral perfusion  NEURO: grossly normal  EXTREMITIES: no cyanosis, clubbing or edema    LAB:     Lab Results   Component Value Date    WBC 5.3 06/07/2021    HGB 8.2 (L) 06/07/2021    HCT 25.3 (L) 06/07/2021    PLT 49.0 (L) 06/07/2021    CREATSERUM 0.94

## 2021-06-25 ENCOUNTER — TELEPHONE (OUTPATIENT)
Dept: SURGERY | Facility: CLINIC | Age: 61
End: 2021-06-25

## 2021-06-25 NOTE — TELEPHONE ENCOUNTER
----- Message from Duke Leung MD sent at 6/23/2021 12:39 PM CDT -----  Your recent urine culture shows some staph organisms.   Because we are going to be attempting a voiding trial I will start you on a short course of antibiotic to prevent infection

## 2021-06-28 PROBLEM — J43.1 PANLOBULAR EMPHYSEMA (HCC): Status: ACTIVE | Noted: 2021-06-28

## 2021-07-01 ENCOUNTER — OFFICE VISIT (OUTPATIENT)
Dept: HEMATOLOGY/ONCOLOGY | Age: 61
End: 2021-07-01
Attending: INTERNAL MEDICINE
Payer: COMMERCIAL

## 2021-07-01 VITALS
DIASTOLIC BLOOD PRESSURE: 70 MMHG | OXYGEN SATURATION: 98 % | BODY MASS INDEX: 31.76 KG/M2 | TEMPERATURE: 97 F | WEIGHT: 197.63 LBS | SYSTOLIC BLOOD PRESSURE: 110 MMHG | RESPIRATION RATE: 18 BRPM | HEART RATE: 80 BPM | HEIGHT: 65.98 IN

## 2021-07-01 DIAGNOSIS — C34.92 PRIMARY MALIGNANT NEOPLASM OF LEFT LUNG METASTATIC TO OTHER SITE (HCC): Primary | ICD-10-CM

## 2021-07-01 DIAGNOSIS — R30.0 DYSURIA: ICD-10-CM

## 2021-07-01 DIAGNOSIS — C79.51 SECONDARY MALIGNANT NEOPLASM OF BONE (HCC): ICD-10-CM

## 2021-07-01 DIAGNOSIS — C34.90 MALIGNANT NEOPLASM OF LUNG, UNSPECIFIED LATERALITY, UNSPECIFIED PART OF LUNG (HCC): ICD-10-CM

## 2021-07-01 DIAGNOSIS — C79.51 SECONDARY CANCER OF BONE (HCC): ICD-10-CM

## 2021-07-01 DIAGNOSIS — R60.9 PERIPHERAL EDEMA: ICD-10-CM

## 2021-07-01 DIAGNOSIS — G89.3 NEOPLASM RELATED PAIN: ICD-10-CM

## 2021-07-01 LAB
ALBUMIN SERPL-MCNC: 3.6 G/DL (ref 3.4–5)
ALBUMIN/GLOB SERPL: 1.1 {RATIO} (ref 1–2)
ALP LIVER SERPL-CCNC: 56 U/L
ALT SERPL-CCNC: 13 U/L
ANION GAP SERPL CALC-SCNC: 6 MMOL/L (ref 0–18)
AST SERPL-CCNC: 12 U/L (ref 15–37)
BASOPHILS # BLD AUTO: 0.01 X10(3) UL (ref 0–0.2)
BASOPHILS NFR BLD AUTO: 0.2 %
BILIRUB SERPL-MCNC: 0.3 MG/DL (ref 0.1–2)
BILIRUB UR QL STRIP.AUTO: NEGATIVE
BUN BLD-MCNC: 12 MG/DL (ref 7–18)
BUN/CREAT SERPL: 11.7 (ref 10–20)
CALCIUM BLD-MCNC: 8.4 MG/DL (ref 8.5–10.1)
CEA SERPL-MCNC: 2.2 NG/ML (ref ?–5)
CHLORIDE SERPL-SCNC: 106 MMOL/L (ref 98–112)
CLARITY UR REFRACT.AUTO: CLEAR
CO2 SERPL-SCNC: 24 MMOL/L (ref 21–32)
COLOR UR AUTO: YELLOW
CREAT BLD-MCNC: 1.03 MG/DL
DEPRECATED RDW RBC AUTO: 55.9 FL (ref 35.1–46.3)
EOSINOPHIL # BLD AUTO: 0.03 X10(3) UL (ref 0–0.7)
EOSINOPHIL NFR BLD AUTO: 0.6 %
ERYTHROCYTE [DISTWIDTH] IN BLOOD BY AUTOMATED COUNT: 14.2 % (ref 11–15)
GLOBULIN PLAS-MCNC: 3.4 G/DL (ref 2.8–4.4)
GLUCOSE BLD-MCNC: 84 MG/DL (ref 70–99)
GLUCOSE UR STRIP.AUTO-MCNC: NEGATIVE MG/DL
HCT VFR BLD AUTO: 30.4 %
HGB BLD-MCNC: 9.5 G/DL
IMM GRANULOCYTES # BLD AUTO: 0.02 X10(3) UL (ref 0–1)
IMM GRANULOCYTES NFR BLD: 0.4 %
KETONES UR STRIP.AUTO-MCNC: NEGATIVE MG/DL
LYMPHOCYTES # BLD AUTO: 1.34 X10(3) UL (ref 1–4)
LYMPHOCYTES NFR BLD AUTO: 27.5 %
M PROTEIN MFR SERPL ELPH: 7 G/DL (ref 6.4–8.2)
MCH RBC QN AUTO: 33.9 PG (ref 26–34)
MCHC RBC AUTO-ENTMCNC: 31.3 G/DL (ref 31–37)
MCV RBC AUTO: 108.6 FL
MONOCYTES # BLD AUTO: 0.47 X10(3) UL (ref 0.1–1)
MONOCYTES NFR BLD AUTO: 9.7 %
NEUTROPHILS # BLD AUTO: 3 X10 (3) UL (ref 1.5–7.7)
NEUTROPHILS # BLD AUTO: 3 X10(3) UL (ref 1.5–7.7)
NEUTROPHILS NFR BLD AUTO: 61.6 %
NITRITE UR QL STRIP.AUTO: NEGATIVE
OSMOLALITY SERPL CALC.SUM OF ELEC: 281 MOSM/KG (ref 275–295)
PATIENT FASTING Y/N/NP: NO
PH UR STRIP.AUTO: 7 [PH] (ref 5–8)
PLATELET # BLD AUTO: 165 10(3)UL (ref 150–450)
POTASSIUM SERPL-SCNC: 4.4 MMOL/L (ref 3.5–5.1)
PROT UR STRIP.AUTO-MCNC: NEGATIVE MG/DL
RBC # BLD AUTO: 2.8 X10(6)UL
SODIUM SERPL-SCNC: 136 MMOL/L (ref 136–145)
SP GR UR STRIP.AUTO: 1.01 (ref 1–1.03)
TSI SER-ACNC: 1.22 MIU/ML (ref 0.36–3.74)
UROBILINOGEN UR STRIP.AUTO-MCNC: 0.2 MG/DL
WBC # BLD AUTO: 4.9 X10(3) UL (ref 4–11)
WBC #/AREA URNS AUTO: >50 /HPF

## 2021-07-01 PROCEDURE — 99214 OFFICE O/P EST MOD 30 MIN: CPT | Performed by: INTERNAL MEDICINE

## 2021-07-01 PROCEDURE — 87086 URINE CULTURE/COLONY COUNT: CPT

## 2021-07-01 PROCEDURE — 87077 CULTURE AEROBIC IDENTIFY: CPT

## 2021-07-01 PROCEDURE — 96375 TX/PRO/DX INJ NEW DRUG ADDON: CPT

## 2021-07-01 PROCEDURE — 81001 URINALYSIS AUTO W/SCOPE: CPT

## 2021-07-01 PROCEDURE — 96413 CHEMO IV INFUSION 1 HR: CPT

## 2021-07-01 PROCEDURE — 81015 MICROSCOPIC EXAM OF URINE: CPT

## 2021-07-01 RX ORDER — TORSEMIDE 10 MG/1
10 TABLET ORAL DAILY PRN
Qty: 30 TABLET | Refills: 3 | Status: SHIPPED | OUTPATIENT
Start: 2021-07-01 | End: 2021-09-02

## 2021-07-01 RX ORDER — TORSEMIDE 10 MG/1
10 TABLET ORAL AS NEEDED
Qty: 30 TABLET | Refills: 3 | Status: SHIPPED | OUTPATIENT
Start: 2021-07-01 | End: 2021-07-01

## 2021-07-01 RX ORDER — HYDROCODONE BITARTRATE AND ACETAMINOPHEN 10; 325 MG/1; MG/1
1-2 TABLET ORAL EVERY 6 HOURS PRN
Qty: 120 TABLET | Refills: 0 | Status: SHIPPED | OUTPATIENT
Start: 2021-07-01 | End: 2021-07-26

## 2021-07-01 RX ORDER — MORPHINE SULFATE 30 MG/1
30 TABLET, FILM COATED, EXTENDED RELEASE ORAL EVERY 12 HOURS SCHEDULED
Qty: 60 TABLET | Refills: 0 | Status: SHIPPED | OUTPATIENT
Start: 2021-07-01 | End: 2021-08-05

## 2021-07-01 NOTE — PROGRESS NOTES
Patient is here for MD f/u and cycle 6 of Keytruda. Patient had pham catheter removed 2 weeks ago. Started on Bactrim per Dr Shubham Chen. Patient c/o urinary retention and dysuria since catheter was removed. Denies fevers. Patient still has edema in BLE.  Appe

## 2021-07-01 NOTE — PROGRESS NOTES
Pt here for C6D1.   Arrives Ambulating independently, accompanied by Self           Pregnancy screening: N/A    Modifications in dose or schedule: No     Frequency of blood return and site check throughout administration: Prior to administration   Discharge

## 2021-07-02 NOTE — PROGRESS NOTES
[address] [place]    PATIENT'S NAME: Kaylyn Amezquita   ATTENDING PHYSICIAN: JETT Haskins    PATIENT ACCOUNT #: [de-identified] LOCATION: [address] [place] [address] RECORD #: [id number] YOB: 1960   DATE OF SERVICE: 07/01/2021       CANCER CENTER P MEDICATIONS:  His current medications include albuterol 2 puffs q.6 h. p.r.n., albuterol nebulizer p.r.n., apixaban 5 mg b.i.d., clotrimazole/betamethasone cream b.i.d. p.r.n., gabapentin 600 mg 3 times a day, hydrocodone 10/325 one to two tablets q.6 take it every 3 or 4 days to see if his edema clears. Dictated By Nickie Estevez M.D.  d: 07/01/2021 12:29:12  t: 07/02/2021 04:18:46  [facility] [id number]  /    cc: Dinorah Amaro M.D.    Richard Christine M.D.

## 2021-07-22 ENCOUNTER — OFFICE VISIT (OUTPATIENT)
Dept: HEMATOLOGY/ONCOLOGY | Age: 61
End: 2021-07-22
Attending: INTERNAL MEDICINE
Payer: COMMERCIAL

## 2021-07-22 VITALS
DIASTOLIC BLOOD PRESSURE: 71 MMHG | SYSTOLIC BLOOD PRESSURE: 108 MMHG | TEMPERATURE: 98 F | WEIGHT: 191.13 LBS | OXYGEN SATURATION: 94 % | HEART RATE: 84 BPM | HEIGHT: 65.98 IN | BODY MASS INDEX: 30.72 KG/M2 | RESPIRATION RATE: 18 BRPM

## 2021-07-22 DIAGNOSIS — C34.92 PRIMARY MALIGNANT NEOPLASM OF LEFT LUNG METASTATIC TO OTHER SITE (HCC): Primary | ICD-10-CM

## 2021-07-22 DIAGNOSIS — C79.51 SECONDARY CANCER OF BONE (HCC): ICD-10-CM

## 2021-07-22 DIAGNOSIS — C34.92 PRIMARY MALIGNANT NEOPLASM OF LEFT LUNG METASTATIC TO OTHER SITE (HCC): ICD-10-CM

## 2021-07-22 LAB
ALBUMIN SERPL-MCNC: 3 G/DL (ref 3.4–5)
ALBUMIN/GLOB SERPL: 0.9 {RATIO} (ref 1–2)
ALP LIVER SERPL-CCNC: 61 U/L
ALT SERPL-CCNC: 11 U/L
ANION GAP SERPL CALC-SCNC: 7 MMOL/L (ref 0–18)
AST SERPL-CCNC: 10 U/L (ref 15–37)
BASOPHILS # BLD AUTO: 0.02 X10(3) UL (ref 0–0.2)
BASOPHILS NFR BLD AUTO: 0.3 %
BILIRUB SERPL-MCNC: 0.3 MG/DL (ref 0.1–2)
BUN BLD-MCNC: 13 MG/DL (ref 7–18)
BUN/CREAT SERPL: 16.7 (ref 10–20)
CALCIUM BLD-MCNC: 8.5 MG/DL (ref 8.5–10.1)
CEA SERPL-MCNC: 2.4 NG/ML (ref ?–5)
CHLORIDE SERPL-SCNC: 106 MMOL/L (ref 98–112)
CO2 SERPL-SCNC: 24 MMOL/L (ref 21–32)
CREAT BLD-MCNC: 0.78 MG/DL
DEPRECATED RDW RBC AUTO: 50.7 FL (ref 35.1–46.3)
EOSINOPHIL # BLD AUTO: 0.04 X10(3) UL (ref 0–0.7)
EOSINOPHIL NFR BLD AUTO: 0.6 %
ERYTHROCYTE [DISTWIDTH] IN BLOOD BY AUTOMATED COUNT: 13.7 % (ref 11–15)
GLOBULIN PLAS-MCNC: 3.5 G/DL (ref 2.8–4.4)
GLUCOSE BLD-MCNC: 92 MG/DL (ref 70–99)
HCT VFR BLD AUTO: 35.2 %
HGB BLD-MCNC: 11.2 G/DL
IMM GRANULOCYTES # BLD AUTO: 0.05 X10(3) UL (ref 0–1)
IMM GRANULOCYTES NFR BLD: 0.8 %
LYMPHOCYTES # BLD AUTO: 1.9 X10(3) UL (ref 1–4)
LYMPHOCYTES NFR BLD AUTO: 28.7 %
M PROTEIN MFR SERPL ELPH: 6.5 G/DL (ref 6.4–8.2)
MCH RBC QN AUTO: 32.1 PG (ref 26–34)
MCHC RBC AUTO-ENTMCNC: 31.8 G/DL (ref 31–37)
MCV RBC AUTO: 100.9 FL
MONOCYTES # BLD AUTO: 0.72 X10(3) UL (ref 0.1–1)
MONOCYTES NFR BLD AUTO: 10.9 %
NEUTROPHILS # BLD AUTO: 3.89 X10 (3) UL (ref 1.5–7.7)
NEUTROPHILS # BLD AUTO: 3.89 X10(3) UL (ref 1.5–7.7)
NEUTROPHILS NFR BLD AUTO: 58.7 %
OSMOLALITY SERPL CALC.SUM OF ELEC: 284 MOSM/KG (ref 275–295)
PATIENT FASTING Y/N/NP: NO
PLATELET # BLD AUTO: 177 10(3)UL (ref 150–450)
POTASSIUM SERPL-SCNC: 4.2 MMOL/L (ref 3.5–5.1)
RBC # BLD AUTO: 3.49 X10(6)UL
SODIUM SERPL-SCNC: 137 MMOL/L (ref 136–145)
TSI SER-ACNC: 1.04 MIU/ML (ref 0.36–3.74)
WBC # BLD AUTO: 6.6 X10(3) UL (ref 4–11)

## 2021-07-22 PROCEDURE — 99214 OFFICE O/P EST MOD 30 MIN: CPT | Performed by: INTERNAL MEDICINE

## 2021-07-22 PROCEDURE — 96413 CHEMO IV INFUSION 1 HR: CPT

## 2021-07-22 RX ORDER — TORSEMIDE 10 MG/1
10 TABLET ORAL DAILY
Qty: 30 TABLET | Refills: 5 | Status: SHIPPED | OUTPATIENT
Start: 2021-07-22 | End: 2021-09-02

## 2021-07-22 RX ORDER — POTASSIUM CHLORIDE 750 MG/1
10 TABLET, EXTENDED RELEASE ORAL 2 TIMES DAILY
Qty: 60 TABLET | Refills: 5 | Status: SHIPPED | OUTPATIENT
Start: 2021-07-22 | End: 2021-11-23

## 2021-07-22 RX ORDER — METOPROLOL TARTRATE 100 MG/1
100 TABLET ORAL 2 TIMES DAILY
COMMUNITY
Start: 2021-07-08 | End: 2021-11-23

## 2021-07-22 NOTE — PROGRESS NOTES
Pt here for C7D1 of Keytruda.   Arrives Ambulating independently, accompanied by Self           Pregnancy screening: Not applicable    Modifications in dose or schedule: No     Frequency of blood return and site check throughout administration: Prior to adm

## 2021-07-22 NOTE — PROGRESS NOTES
Patient is here for MD f/u for Lung Cancer and cycle 7 of Keytruda. Patient denies pain. Taking Mg Citrate and/or MOM for constipation. Appetite is good. Mild fatigue. Patient coughs up thick white phlegm. No fevers. Using a cane with ambulation.        Edu

## 2021-07-26 DIAGNOSIS — C34.92 PRIMARY MALIGNANT NEOPLASM OF LEFT LUNG METASTATIC TO OTHER SITE (HCC): ICD-10-CM

## 2021-07-26 DIAGNOSIS — C79.51 SECONDARY MALIGNANT NEOPLASM OF BONE (HCC): ICD-10-CM

## 2021-07-26 DIAGNOSIS — C79.51 SECONDARY CANCER OF BONE (HCC): ICD-10-CM

## 2021-07-26 RX ORDER — GABAPENTIN 300 MG/1
600 CAPSULE ORAL 3 TIMES DAILY
Qty: 90 CAPSULE | Refills: 5 | Status: SHIPPED | OUTPATIENT
Start: 2021-07-26 | End: 2021-09-14

## 2021-07-26 RX ORDER — GABAPENTIN 300 MG/1
CAPSULE ORAL
Qty: 90 CAPSULE | Refills: 5 | Status: SHIPPED | OUTPATIENT
Start: 2021-07-26 | End: 2021-09-28

## 2021-07-26 RX ORDER — HYDROCODONE BITARTRATE AND ACETAMINOPHEN 10; 325 MG/1; MG/1
1-2 TABLET ORAL EVERY 6 HOURS PRN
Qty: 120 TABLET | Refills: 0 | Status: SHIPPED | OUTPATIENT
Start: 2021-07-26 | End: 2021-08-17

## 2021-07-30 ENCOUNTER — TELEPHONE (OUTPATIENT)
Dept: HEMATOLOGY/ONCOLOGY | Age: 61
End: 2021-07-30

## 2021-07-30 NOTE — TELEPHONE ENCOUNTER
Patient calling requesting medication refill on    apixaban 5 MG Oral Tab    This medication was Prescribed 6/2/2021 by Karlos Snell.  When patient was in the hospital

## 2021-08-03 ENCOUNTER — HOSPITAL ENCOUNTER (OUTPATIENT)
Dept: CT IMAGING | Age: 61
Discharge: HOME | End: 2021-08-03
Attending: INTERNAL MEDICINE
Payer: COMMERCIAL

## 2021-08-03 DIAGNOSIS — C79.51 SECONDARY CANCER OF BONE (HCC): ICD-10-CM

## 2021-08-03 DIAGNOSIS — C34.92 PRIMARY MALIGNANT NEOPLASM OF LEFT LUNG METASTATIC TO OTHER SITE (HCC): ICD-10-CM

## 2021-08-03 PROCEDURE — 74177 CT ABD & PELVIS W/CONTRAST: CPT | Performed by: INTERNAL MEDICINE

## 2021-08-03 PROCEDURE — 71260 CT THORAX DX C+: CPT | Performed by: INTERNAL MEDICINE

## 2021-08-03 NOTE — PROGRESS NOTES
[address] [place]    PATIENT'S NAME: Katie Cummings   ATTENDING PHYSICIAN: JETT Ayala    PATIENT ACCOUNT #: [de-identified] LOCATION: [address] [place] [address] RECORD #: [id number] YOB: 1960   DATE OF SERVICE: 07/22/2021       CANCER CENTER P twice daily, clotrimazole/betamethasone b.i.d. p.r.n., gabapentin 300 mg 2 capsules t.i.d. p.r.n., hydrocodone/acetaminophen 10/325 one to two tablets q.6 h. p.r.n., metoprolol tartrate 100 mg twice daily, mirtazapine 45 mg nightly, morphine sulfate extend 08:01:02  [facility] [id number]  /    cc: ZACK Ho M.D.

## 2021-08-05 DIAGNOSIS — C34.92 PRIMARY MALIGNANT NEOPLASM OF LEFT LUNG METASTATIC TO OTHER SITE (HCC): ICD-10-CM

## 2021-08-05 DIAGNOSIS — C79.51 SECONDARY CANCER OF BONE (HCC): ICD-10-CM

## 2021-08-05 DIAGNOSIS — G89.3 NEOPLASM RELATED PAIN: ICD-10-CM

## 2021-08-05 RX ORDER — MORPHINE SULFATE 30 MG/1
30 TABLET, FILM COATED, EXTENDED RELEASE ORAL EVERY 12 HOURS SCHEDULED
Qty: 60 TABLET | Refills: 0 | Status: SHIPPED | OUTPATIENT
Start: 2021-08-05 | End: 2021-08-27

## 2021-08-05 NOTE — TELEPHONE ENCOUNTER
Patient calling requesting refill on medication    morphINE Sulfate ER 30 MG Oral Tab CR    No refills available on this medication

## 2021-08-12 ENCOUNTER — OFFICE VISIT (OUTPATIENT)
Dept: HEMATOLOGY/ONCOLOGY | Age: 61
End: 2021-08-12
Attending: INTERNAL MEDICINE
Payer: COMMERCIAL

## 2021-08-12 VITALS
TEMPERATURE: 97 F | RESPIRATION RATE: 18 BRPM | OXYGEN SATURATION: 95 % | SYSTOLIC BLOOD PRESSURE: 106 MMHG | DIASTOLIC BLOOD PRESSURE: 67 MMHG | HEART RATE: 69 BPM

## 2021-08-12 DIAGNOSIS — C79.51 SECONDARY CANCER OF BONE (HCC): ICD-10-CM

## 2021-08-12 DIAGNOSIS — C34.90 MALIGNANT NEOPLASM OF LUNG, UNSPECIFIED LATERALITY, UNSPECIFIED PART OF LUNG (HCC): Primary | ICD-10-CM

## 2021-08-12 LAB
CALCIUM BLD-MCNC: 8.9 MG/DL (ref 8.5–10.1)
CREAT BLD-MCNC: 1.01 MG/DL

## 2021-08-12 PROCEDURE — 82565 ASSAY OF CREATININE: CPT

## 2021-08-12 PROCEDURE — 36415 COLL VENOUS BLD VENIPUNCTURE: CPT

## 2021-08-12 PROCEDURE — 96374 THER/PROPH/DIAG INJ IV PUSH: CPT

## 2021-08-12 PROCEDURE — 82310 ASSAY OF CALCIUM: CPT

## 2021-08-12 NOTE — PROGRESS NOTES
Education Record    Learner:  Patient    Disease / Diagnosis:Multiple Myeloma    Barriers / Limitations:  None   Comments:    Method:  Discussion   Comments:    General Topics:  Procedure   Comments:    Outcome:  Shows understanding   Comments:

## 2021-08-17 DIAGNOSIS — C79.51 SECONDARY CANCER OF BONE (HCC): ICD-10-CM

## 2021-08-17 DIAGNOSIS — C34.92 PRIMARY MALIGNANT NEOPLASM OF LEFT LUNG METASTATIC TO OTHER SITE (HCC): ICD-10-CM

## 2021-08-17 DIAGNOSIS — C79.51 SECONDARY MALIGNANT NEOPLASM OF BONE (HCC): ICD-10-CM

## 2021-08-17 RX ORDER — HYDROCODONE BITARTRATE AND ACETAMINOPHEN 10; 325 MG/1; MG/1
1-2 TABLET ORAL EVERY 6 HOURS PRN
Qty: 120 TABLET | Refills: 0 | Status: SHIPPED | OUTPATIENT
Start: 2021-08-17 | End: 2021-09-08

## 2021-08-27 DIAGNOSIS — C34.92 PRIMARY MALIGNANT NEOPLASM OF LEFT LUNG METASTATIC TO OTHER SITE (HCC): ICD-10-CM

## 2021-08-27 DIAGNOSIS — G89.3 NEOPLASM RELATED PAIN: ICD-10-CM

## 2021-08-27 DIAGNOSIS — C79.51 SECONDARY CANCER OF BONE (HCC): ICD-10-CM

## 2021-08-27 RX ORDER — MORPHINE SULFATE 30 MG/1
30 TABLET, FILM COATED, EXTENDED RELEASE ORAL EVERY 12 HOURS SCHEDULED
Qty: 60 TABLET | Refills: 0 | Status: SHIPPED | OUTPATIENT
Start: 2021-08-27 | End: 2021-09-02

## 2021-09-02 ENCOUNTER — OFFICE VISIT (OUTPATIENT)
Dept: HEMATOLOGY/ONCOLOGY | Age: 61
End: 2021-09-02
Attending: INTERNAL MEDICINE
Payer: COMMERCIAL

## 2021-09-02 VITALS
TEMPERATURE: 99 F | WEIGHT: 191.63 LBS | BODY MASS INDEX: 30.8 KG/M2 | SYSTOLIC BLOOD PRESSURE: 113 MMHG | HEART RATE: 79 BPM | OXYGEN SATURATION: 98 % | HEIGHT: 65.98 IN | RESPIRATION RATE: 20 BRPM | DIASTOLIC BLOOD PRESSURE: 72 MMHG

## 2021-09-02 DIAGNOSIS — C34.92 PRIMARY MALIGNANT NEOPLASM OF LEFT LUNG METASTATIC TO OTHER SITE (HCC): Primary | ICD-10-CM

## 2021-09-02 DIAGNOSIS — G89.3 NEOPLASM RELATED PAIN: ICD-10-CM

## 2021-09-02 DIAGNOSIS — C79.51 SECONDARY CANCER OF BONE (HCC): ICD-10-CM

## 2021-09-02 DIAGNOSIS — C34.92 PRIMARY MALIGNANT NEOPLASM OF LEFT LUNG METASTATIC TO OTHER SITE (HCC): ICD-10-CM

## 2021-09-02 LAB
ALBUMIN SERPL-MCNC: 3.3 G/DL (ref 3.4–5)
ALBUMIN/GLOB SERPL: 0.9 {RATIO} (ref 1–2)
ALP LIVER SERPL-CCNC: 60 U/L
ALT SERPL-CCNC: 11 U/L
ANION GAP SERPL CALC-SCNC: 4 MMOL/L (ref 0–18)
AST SERPL-CCNC: 10 U/L (ref 15–37)
BASOPHILS # BLD AUTO: 0.03 X10(3) UL (ref 0–0.2)
BASOPHILS NFR BLD AUTO: 0.4 %
BILIRUB SERPL-MCNC: 0.2 MG/DL (ref 0.1–2)
BUN BLD-MCNC: 17 MG/DL (ref 7–18)
CALCIUM BLD-MCNC: 8.6 MG/DL (ref 8.5–10.1)
CEA SERPL-MCNC: 4.2 NG/ML (ref ?–5)
CHLORIDE SERPL-SCNC: 107 MMOL/L (ref 98–112)
CO2 SERPL-SCNC: 26 MMOL/L (ref 21–32)
CREAT BLD-MCNC: 0.92 MG/DL
EOSINOPHIL # BLD AUTO: 0.07 X10(3) UL (ref 0–0.7)
EOSINOPHIL NFR BLD AUTO: 1 %
ERYTHROCYTE [DISTWIDTH] IN BLOOD BY AUTOMATED COUNT: 13.9 %
GLOBULIN PLAS-MCNC: 3.5 G/DL (ref 2.8–4.4)
GLUCOSE BLD-MCNC: 101 MG/DL (ref 70–99)
HCT VFR BLD AUTO: 36.3 %
HGB BLD-MCNC: 11.2 G/DL
IMM GRANULOCYTES # BLD AUTO: 0.03 X10(3) UL (ref 0–1)
IMM GRANULOCYTES NFR BLD: 0.4 %
LYMPHOCYTES # BLD AUTO: 1.29 X10(3) UL (ref 1–4)
LYMPHOCYTES NFR BLD AUTO: 18.9 %
M PROTEIN MFR SERPL ELPH: 6.8 G/DL (ref 6.4–8.2)
MCH RBC QN AUTO: 29.3 PG (ref 26–34)
MCHC RBC AUTO-ENTMCNC: 30.9 G/DL (ref 31–37)
MCV RBC AUTO: 95 FL
MONOCYTES # BLD AUTO: 0.62 X10(3) UL (ref 0.1–1)
MONOCYTES NFR BLD AUTO: 9.1 %
NEUTROPHILS # BLD AUTO: 4.79 X10 (3) UL (ref 1.5–7.7)
NEUTROPHILS # BLD AUTO: 4.79 X10(3) UL (ref 1.5–7.7)
NEUTROPHILS NFR BLD AUTO: 70.2 %
OSMOLALITY SERPL CALC.SUM OF ELEC: 286 MOSM/KG (ref 275–295)
PATIENT FASTING Y/N/NP: NO
PLATELET # BLD AUTO: 179 10(3)UL (ref 150–450)
POTASSIUM SERPL-SCNC: 4.7 MMOL/L (ref 3.5–5.1)
RBC # BLD AUTO: 3.82 X10(6)UL
SODIUM SERPL-SCNC: 137 MMOL/L (ref 136–145)
TSI SER-ACNC: 0.8 MIU/ML (ref 0.36–3.74)
WBC # BLD AUTO: 6.8 X10(3) UL (ref 4–11)

## 2021-09-02 PROCEDURE — 99214 OFFICE O/P EST MOD 30 MIN: CPT | Performed by: INTERNAL MEDICINE

## 2021-09-02 PROCEDURE — 96413 CHEMO IV INFUSION 1 HR: CPT

## 2021-09-02 RX ORDER — TORSEMIDE 10 MG/1
20 TABLET ORAL DAILY
Qty: 60 TABLET | Refills: 5 | Status: SHIPPED | OUTPATIENT
Start: 2021-09-02 | End: 2021-11-30

## 2021-09-02 RX ORDER — MORPHINE SULFATE 30 MG/1
30 TABLET, FILM COATED, EXTENDED RELEASE ORAL EVERY 12 HOURS SCHEDULED
Qty: 90 TABLET | Refills: 0 | Status: SHIPPED | OUTPATIENT
Start: 2021-09-02 | End: 2021-10-01

## 2021-09-02 NOTE — PROGRESS NOTES
Patient is here for MD f/u and cycle 8 of Keytruda. . Patient c/o ongoing chronic pitting edema in BLE. Patient has been on Torsemide with no relief. Patient is having difficulties with his balance. Using a cane to ambulate.  Today patient is in a wheelchair

## 2021-09-02 NOTE — PROGRESS NOTES
Pt here for C8D1.   Arrives Ambulating independently, accompanied by Self            Pregnancy screening: N/A     Modifications in dose or schedule: No                              Frequency of blood return and site check throughout administration: Prior to

## 2021-09-04 NOTE — PROGRESS NOTES
[address] [place]    PATIENT'S NAME: Mary Jo Sheridan   ATTENDING PHYSICIAN: JETT Pichardo    PATIENT ACCOUNT #: [de-identified] LOCATION: [address] [place] [address] RECORD #: [id number] YOB: 1960   DATE OF SERVICE: 09/02/2021       CANCER CENTER P changes. He is eating adequately.     MEDICATIONS:  His current medications include albuterol HFA inhaler 2 puffs q.i.d. p.r.n., I have encouraged him to go back to taking his apixaban 5 mg twice daily, gabapentin 600 mg 3 times a day, hydrocodone 10/325 o continuing on pembrolizumab today. The next dose will be in 6 weeks. 2.   Chronic obstructive pulmonary disease. I have encouraged him to go back on his Anoro Ellipta and to take his ProAir HFA inhaler as much as he needs to.   I do not think he has any

## 2021-09-08 ENCOUNTER — TELEPHONE (OUTPATIENT)
Dept: HEMATOLOGY/ONCOLOGY | Age: 61
End: 2021-09-08

## 2021-09-08 DIAGNOSIS — C79.51 SECONDARY CANCER OF BONE (HCC): ICD-10-CM

## 2021-09-08 DIAGNOSIS — C34.92 PRIMARY MALIGNANT NEOPLASM OF LEFT LUNG METASTATIC TO OTHER SITE (HCC): ICD-10-CM

## 2021-09-08 DIAGNOSIS — C79.51 SECONDARY MALIGNANT NEOPLASM OF BONE (HCC): ICD-10-CM

## 2021-09-08 RX ORDER — HYDROCODONE BITARTRATE AND ACETAMINOPHEN 10; 325 MG/1; MG/1
1-2 TABLET ORAL EVERY 6 HOURS PRN
Qty: 120 TABLET | Refills: 0 | Status: SHIPPED | OUTPATIENT
Start: 2021-09-08 | End: 2021-09-28

## 2021-09-08 NOTE — TELEPHONE ENCOUNTER
Patient calling requesting refill on Medication    HYDROcodone-acetaminophen  MG Oral Tab      No refills remaing

## 2021-09-14 ENCOUNTER — OFFICE VISIT (OUTPATIENT)
Dept: INTERNAL MEDICINE CLINIC | Facility: CLINIC | Age: 61
End: 2021-09-14
Payer: COMMERCIAL

## 2021-09-14 ENCOUNTER — PATIENT MESSAGE (OUTPATIENT)
Dept: INTERNAL MEDICINE CLINIC | Facility: CLINIC | Age: 61
End: 2021-09-14

## 2021-09-14 VITALS
WEIGHT: 197 LBS | DIASTOLIC BLOOD PRESSURE: 70 MMHG | TEMPERATURE: 98 F | SYSTOLIC BLOOD PRESSURE: 100 MMHG | OXYGEN SATURATION: 98 % | BODY MASS INDEX: 31.66 KG/M2 | RESPIRATION RATE: 18 BRPM | HEIGHT: 66 IN | HEART RATE: 80 BPM

## 2021-09-14 DIAGNOSIS — R30.9 PAINFUL MICTURITION: ICD-10-CM

## 2021-09-14 DIAGNOSIS — R60.1 ANASARCA: Primary | ICD-10-CM

## 2021-09-14 DIAGNOSIS — J44.9 CHRONIC OBSTRUCTIVE PULMONARY DISEASE, UNSPECIFIED COPD TYPE (HCC): Chronic | ICD-10-CM

## 2021-09-14 DIAGNOSIS — I48.92 ATRIAL FLUTTER, PAROXYSMAL (HCC): Chronic | ICD-10-CM

## 2021-09-14 PROBLEM — R33.9 URINARY RETENTION: Status: RESOLVED | Noted: 2021-06-02 | Resolved: 2021-09-14

## 2021-09-14 PROCEDURE — 99214 OFFICE O/P EST MOD 30 MIN: CPT | Performed by: INTERNAL MEDICINE

## 2021-09-14 PROCEDURE — 3008F BODY MASS INDEX DOCD: CPT | Performed by: INTERNAL MEDICINE

## 2021-09-14 PROCEDURE — 3078F DIAST BP <80 MM HG: CPT | Performed by: INTERNAL MEDICINE

## 2021-09-14 PROCEDURE — 3074F SYST BP LT 130 MM HG: CPT | Performed by: INTERNAL MEDICINE

## 2021-09-14 NOTE — PATIENT INSTRUCTIONS
Patient lab work is reviewed include CBC CMP thyroid chest x-ray echocardiogram.  Albumin seems to be fairly decent. Patient will need some adjustment on diuretics. Currently is on a loop diuretic.   Get an opinion from renal

## 2021-09-14 NOTE — PROGRESS NOTES
Rula Malik  10/22/1960 is a 62year old male. Patient presents with:  Leg Swelling  Urinary Symptoms       HPI:   Patient complains of progressive leg edema has seen Dr. Irene Alpers was prescribed him diuretics without much success.   Patient says he pulmonary disease) ([facility]    • Depression     Dr. Nestor Charles   • Essential hypertension, benign    • Former cigarette smoker    • High blood pressure    • Insomnia     Dr. Nestor Charles   • Metastatic lung cancer (metastasis from lung to other site) [facility])    • Muscle swelling and urinary symptoms.     Diagnoses and all orders for this visit:    Anasarca  -     NEPHROLOGY - INTERNAL    Chronic obstructive pulmonary disease, unspecified COPD type (HCC)    Atrial flutter, paroxysmal (HCC)    Painful micturition  -     UROL

## 2021-09-16 ENCOUNTER — OFFICE VISIT (OUTPATIENT)
Dept: SURGERY | Facility: CLINIC | Age: 61
End: 2021-09-16
Payer: COMMERCIAL

## 2021-09-16 DIAGNOSIS — R33.9 URINARY RETENTION: Primary | ICD-10-CM

## 2021-09-16 DIAGNOSIS — R33.9 URINE RETENTION: ICD-10-CM

## 2021-09-16 DIAGNOSIS — N40.0 ENLARGED PROSTATE: ICD-10-CM

## 2021-09-16 LAB
BILIRUB UR QL: NEGATIVE
COLOR UR: YELLOW
GLUCOSE UR-MCNC: NEGATIVE MG/DL
KETONES UR-MCNC: NEGATIVE MG/DL
NITRITE UR QL STRIP.AUTO: NEGATIVE
PH UR: 6 [PH] (ref 5–8)
PROT UR-MCNC: NEGATIVE MG/DL
SP GR UR STRIP: 1.01 (ref 1–1.03)
UROBILINOGEN UR STRIP-ACNC: <2
WBC #/AREA URNS AUTO: >50 /HPF

## 2021-09-16 PROCEDURE — 51798 US URINE CAPACITY MEASURE: CPT | Performed by: UROLOGY

## 2021-09-16 PROCEDURE — 99213 OFFICE O/P EST LOW 20 MIN: CPT | Performed by: UROLOGY

## 2021-09-16 PROCEDURE — 51702 INSERT TEMP BLADDER CATH: CPT | Performed by: UROLOGY

## 2021-09-16 RX ORDER — TAMSULOSIN HYDROCHLORIDE 0.4 MG/1
0.4 CAPSULE ORAL 2 TIMES DAILY
Qty: 180 CAPSULE | Refills: 3 | Status: SHIPPED | OUTPATIENT
Start: 2021-09-16 | End: 2021-12-15

## 2021-09-16 NOTE — PROGRESS NOTES
Rooming Clinician:     Shaheed Dumont is a 62year old male. Patient presents with: Follow - Up: pham 1 month ago. Sill have dysuria        HPI:     Patient has a history of urinary retention while hospitalized in June.   He came to the office after be into the lungs daily. 3 each 3   • mirtazapine (REMERON) 45 MG Oral Tab Take 1 tablet (45 mg total) by mouth nightly.  90 tablet 3   • ALBUTEROL SULFATE  (90 Base) MCG/ACT Inhalation Aero Soln INHALE 2 PUFFS INTO THE LUNGS EVERY 4 HOURS AS NEEDED FOR HPI  NEURO: no sensory or motor complaint    EXAM:     There were no vitals taken for this visit.   GENERAL: well developed, well nourished,in no apparent distress  SKIN: no rashes,no suspicious lesions  HEENT: atraumatic, normocephalic,ears and throat are

## 2021-09-17 ENCOUNTER — NURSE ONLY (OUTPATIENT)
Dept: SURGERY | Facility: CLINIC | Age: 61
End: 2021-09-17
Payer: COMMERCIAL

## 2021-09-17 ENCOUNTER — TELEPHONE (OUTPATIENT)
Dept: SURGERY | Facility: CLINIC | Age: 61
End: 2021-09-17

## 2021-09-17 DIAGNOSIS — R33.9 URINARY RETENTION: Primary | ICD-10-CM

## 2021-09-17 PROCEDURE — 51701 INSERT BLADDER CATHETER: CPT | Performed by: UROLOGY

## 2021-09-17 NOTE — PROGRESS NOTES
RN received an order to place Tirado cath. PVR > 976 ml. Procedure explained. Patient agreed to proceed Tirado placement. Sterile field started. Applied sterile gloves. Patient was placed on the exam table in supine position.  Meatus was prepped with Povidon

## 2021-09-17 NOTE — PROGRESS NOTES
Here for pham cath reinsertion, Pt stated that his catheter just fell out doesn't know what happened. Verified name and . Assisted to exam table. Pham cath still connected to leg bag but not into penis. New 16Fr. Cath inserted. Aprox. 800 ml.  Clear ye

## 2021-09-17 NOTE — TELEPHONE ENCOUNTER
This RN called patient in response to his call:     \"Patient was seen in clinic yesterday and indicates his catheter fell out. Patient also asking about rx Flomax, please call at [phone]. \"    RN  offered an appt with nurse today, 9/17 at 2:30

## 2021-09-17 NOTE — TELEPHONE ENCOUNTER
Patient was seen in clinic yesterday and indicates his catheter fell out. Patient also asking about rx Flomax, please call at [phone].

## 2021-09-23 ENCOUNTER — OFFICE VISIT (OUTPATIENT)
Dept: NEPHROLOGY | Facility: CLINIC | Age: 61
End: 2021-09-23
Payer: COMMERCIAL

## 2021-09-23 VITALS
WEIGHT: 197 LBS | BODY MASS INDEX: 32 KG/M2 | SYSTOLIC BLOOD PRESSURE: 106 MMHG | DIASTOLIC BLOOD PRESSURE: 72 MMHG | OXYGEN SATURATION: 100 % | HEART RATE: 71 BPM

## 2021-09-23 DIAGNOSIS — R60.1 ANASARCA: ICD-10-CM

## 2021-09-23 DIAGNOSIS — R60.9 EDEMA, UNSPECIFIED TYPE: Primary | ICD-10-CM

## 2021-09-23 PROCEDURE — 3078F DIAST BP <80 MM HG: CPT | Performed by: INTERNAL MEDICINE

## 2021-09-23 PROCEDURE — 3074F SYST BP LT 130 MM HG: CPT | Performed by: INTERNAL MEDICINE

## 2021-09-23 PROCEDURE — 99204 OFFICE O/P NEW MOD 45 MIN: CPT | Performed by: INTERNAL MEDICINE

## 2021-09-23 RX ORDER — BUMETANIDE 2 MG/1
2 TABLET ORAL DAILY
Qty: 60 TABLET | Refills: 11 | Status: SHIPPED | OUTPATIENT
Start: 2021-09-23 | End: 2022-01-14

## 2021-09-23 NOTE — PROGRESS NOTES
Nephrology Consult Note    REASON FOR CONSULT: Edema    ASSESSMENT/PLAN:      1) Edema- diffuse lower extremity edema has developed over the last several months and is likely in part due to metastatic cancer combined with hypoalbuminemia, venous insufficie abd or flank pain  Denies N/V/D  Denies change in urinary habits or gross hematuria  Denies skin rashes/myalgias/arthralgias    PMH:  Past Medical History:   Diagnosis Date   • Anxiety     Dr. Arthur Dumont   • Back problem    • COPD (chronic obstructive pulmonar nightly.  90 tablet 3   • ALBUTEROL SULFATE  (90 Base) MCG/ACT Inhalation Aero Soln INHALE 2 PUFFS INTO THE LUNGS EVERY 4 HOURS AS NEEDED FOR WHEEZING OR SHORTNESS OF BREATH 8.5 g 0   • clotrimazole-betamethasone 1-0.05 % External Cream Apply 1 Appli diffuse 4+ LE edema.   Neurologic:  normal affect, cranial nerves grossly intact, moving all extremities  Skin: Warm and dry, no rashes        Vlad Lopez MD  9/23/2021  2:03 PM

## 2021-09-28 DIAGNOSIS — C34.92 PRIMARY MALIGNANT NEOPLASM OF LEFT LUNG METASTATIC TO OTHER SITE (HCC): ICD-10-CM

## 2021-09-28 DIAGNOSIS — C79.51 SECONDARY MALIGNANT NEOPLASM OF BONE (HCC): ICD-10-CM

## 2021-09-28 DIAGNOSIS — C79.51 SECONDARY CANCER OF BONE (HCC): ICD-10-CM

## 2021-09-28 RX ORDER — HYDROCODONE BITARTRATE AND ACETAMINOPHEN 10; 325 MG/1; MG/1
1-2 TABLET ORAL EVERY 6 HOURS PRN
Qty: 120 TABLET | Refills: 0 | Status: SHIPPED | OUTPATIENT
Start: 2021-09-28 | End: 2021-10-18

## 2021-09-28 RX ORDER — GABAPENTIN 300 MG/1
600 CAPSULE ORAL 3 TIMES DAILY
Qty: 90 CAPSULE | Refills: 5 | Status: SHIPPED | OUTPATIENT
Start: 2021-09-28 | End: 2021-11-30

## 2021-09-30 ENCOUNTER — NURSE ONLY (OUTPATIENT)
Dept: SURGERY | Facility: CLINIC | Age: 61
End: 2021-09-30
Payer: COMMERCIAL

## 2021-09-30 ENCOUNTER — OFFICE VISIT (OUTPATIENT)
Dept: SURGERY | Facility: CLINIC | Age: 61
End: 2021-09-30
Payer: COMMERCIAL

## 2021-09-30 DIAGNOSIS — R33.9 URINE RETENTION: Primary | ICD-10-CM

## 2021-09-30 DIAGNOSIS — N40.0 ENLARGED PROSTATE: Primary | ICD-10-CM

## 2021-09-30 DIAGNOSIS — C34.92 PRIMARY MALIGNANT NEOPLASM OF LEFT LUNG METASTATIC TO OTHER SITE (HCC): ICD-10-CM

## 2021-09-30 DIAGNOSIS — C34.92 MALIGNANT NEOPLASM OF LEFT LUNG, UNSPECIFIED PART OF LUNG (HCC): ICD-10-CM

## 2021-09-30 DIAGNOSIS — Z87.898 HISTORY OF URINARY RETENTION: ICD-10-CM

## 2021-09-30 DIAGNOSIS — K59.03 DRUG-INDUCED CONSTIPATION: ICD-10-CM

## 2021-09-30 PROCEDURE — 51798 US URINE CAPACITY MEASURE: CPT | Performed by: UROLOGY

## 2021-09-30 PROCEDURE — 51700 IRRIGATION OF BLADDER: CPT | Performed by: UROLOGY

## 2021-09-30 PROCEDURE — 99213 OFFICE O/P EST LOW 20 MIN: CPT | Performed by: UROLOGY

## 2021-09-30 NOTE — PROGRESS NOTES
Rooming Clinician:     Mor Moss is a 62year old male. Patient presents with: Follow - Up: PVr        HPI:     Patient returns to the office for follow-up exam.  Catheter was removed this morning.  930. It is 230 now. He has not voided.   Does n clotrimazole-betamethasone 1-0.05 % External Cream Apply 1 Application topically 2 (two) times daily as needed. 45 g 3   • QUEtiapine Fumarate 100 MG Oral Tab Take 1 tablet (100 mg total) by mouth nightly.  90 tablet 3       Pcn [Penicillins]           Comm respiratory motion without distress  CARDIO: normal peripheral perfusion  NEURO: grossly normal  EXTREMITIES: no cyanosis, clubbing or edema    LAB:     Lab Results   Component Value Date    WBC 6.8 09/02/2021    HGB 11.2 (L) 09/02/2021    HCT 36.3 (L) 09/

## 2021-09-30 NOTE — PROGRESS NOTES
Pt verified name and . He was placed in a sitting position, because he was intolerant of a supine position. Urine was clear and yellow in pham bag. Using aseptic technique the pham balloon was deflated and pham was removed.   Pt tolerated procedure w

## 2021-10-01 DIAGNOSIS — G89.3 NEOPLASM RELATED PAIN: ICD-10-CM

## 2021-10-01 DIAGNOSIS — C34.92 PRIMARY MALIGNANT NEOPLASM OF LEFT LUNG METASTATIC TO OTHER SITE (HCC): ICD-10-CM

## 2021-10-01 DIAGNOSIS — C79.51 SECONDARY CANCER OF BONE (HCC): ICD-10-CM

## 2021-10-01 RX ORDER — MORPHINE SULFATE 30 MG/1
30 TABLET, FILM COATED, EXTENDED RELEASE ORAL EVERY 12 HOURS SCHEDULED
Qty: 90 TABLET | Refills: 0 | Status: SHIPPED | OUTPATIENT
Start: 2021-10-01 | End: 2021-11-01

## 2021-10-04 ENCOUNTER — LAB ENCOUNTER (OUTPATIENT)
Dept: LAB | Age: 61
End: 2021-10-04
Attending: PHYSICIAN ASSISTANT
Payer: COMMERCIAL

## 2021-10-04 ENCOUNTER — OFFICE VISIT (OUTPATIENT)
Dept: SURGERY | Facility: CLINIC | Age: 61
End: 2021-10-04
Payer: COMMERCIAL

## 2021-10-04 DIAGNOSIS — K59.03 DRUG-INDUCED CONSTIPATION: ICD-10-CM

## 2021-10-04 DIAGNOSIS — R33.9 URINARY RETENTION: ICD-10-CM

## 2021-10-04 DIAGNOSIS — N13.8 BPH WITH OBSTRUCTION/LOWER URINARY TRACT SYMPTOMS: ICD-10-CM

## 2021-10-04 DIAGNOSIS — R33.9 URINARY RETENTION: Primary | ICD-10-CM

## 2021-10-04 DIAGNOSIS — N40.1 BPH WITH OBSTRUCTION/LOWER URINARY TRACT SYMPTOMS: ICD-10-CM

## 2021-10-04 PROCEDURE — 80048 BASIC METABOLIC PNL TOTAL CA: CPT

## 2021-10-04 PROCEDURE — 51798 US URINE CAPACITY MEASURE: CPT | Performed by: PHYSICIAN ASSISTANT

## 2021-10-04 PROCEDURE — 36415 COLL VENOUS BLD VENIPUNCTURE: CPT

## 2021-10-04 PROCEDURE — 99214 OFFICE O/P EST MOD 30 MIN: CPT | Performed by: PHYSICIAN ASSISTANT

## 2021-10-04 RX ORDER — DUTASTERIDE 0.5 MG/1
0.5 CAPSULE, LIQUID FILLED ORAL DAILY
Qty: 90 CAPSULE | Refills: 3 | Status: SHIPPED | OUTPATIENT
Start: 2021-10-04

## 2021-10-04 NOTE — PROGRESS NOTES
Subjective:   Candace Avelar is a 62year old male who presents for follow up for urinary retention. Tirado removed 9/30. He denies urinary frequency. Voiding x 3 during the day and overnight 1-2x. Stream varies. Denies gross hematuria.  Has ongoing disco times daily. 60 tablet 5   • Potassium Chloride ER 10 MEQ Oral Tab CR Take 1 tablet (10 mEq total) by mouth 2 (two) times daily. 60 tablet 5   • metoprolol tartrate 100 MG Oral Tab Take 100 mg by mouth 2 (two) times daily.      • umeclidinium-vilanterol (AN 1.015 1.009   PHURINE 8.0 7.0 6.0   PROUR 100 * Negative Negative   GLUUR Negative Negative Negative   KETUR Negative Negative Negative   BILUR Negative Negative Negative   BLOODURINE Moderate* Trace-lysed* Moderate*   NITRITE Negative Negative Negative

## 2021-10-06 ENCOUNTER — TELEPHONE (OUTPATIENT)
Dept: SURGERY | Facility: CLINIC | Age: 61
End: 2021-10-06

## 2021-10-06 NOTE — TELEPHONE ENCOUNTER
This RN called patient in response to his call:     \"Per pt has blood in urine. Please call thank you. \"    Patient reports having drops of blood in urine x 2 this AM. Denies having urinary symptoms at this time.  No burning sensation when urinating and n

## 2021-10-07 ENCOUNTER — TELEPHONE (OUTPATIENT)
Dept: HEMATOLOGY/ONCOLOGY | Facility: HOSPITAL | Age: 61
End: 2021-10-07

## 2021-10-07 NOTE — TELEPHONE ENCOUNTER
Nadine Mendoza called he has been having blood in urine x 2 days(cranberry color with residue), no burning or pain.   Had seen Urology earlier(refer to their notes)for other problem urinary retention, swelling of feet and did not have any blood in urine, was started

## 2021-10-07 NOTE — TELEPHONE ENCOUNTER
Spoke to patient. Told him to stop Eliquis for 2 days and then if bleeding starts, rechallenge.   KAREN Nielsen

## 2021-10-11 ENCOUNTER — TELEPHONE (OUTPATIENT)
Dept: HEMATOLOGY/ONCOLOGY | Facility: HOSPITAL | Age: 61
End: 2021-10-11

## 2021-10-11 DIAGNOSIS — R30.0 DYSURIA: Primary | ICD-10-CM

## 2021-10-11 NOTE — TELEPHONE ENCOUNTER
Called Edith Amairani he was calling in update. He held his Eliquis and has no more blood in urine, to restart Eliquis, but still cloudy and painful urination. Denies fevers, no sob or chest pain, denies n/v/d, eating and drinking.   Did have a bad night last nigh

## 2021-10-11 NOTE — TELEPHONE ENCOUNTER
Jeffrey Lopez called to give Dr. Isabel Lee an update on his condition. He states that he no longer has blood in his urine, but still cloudy. He also states that it hurts to urinate.  He has an appointment scheduled with Dr. Akash Ramos on Wednesday and then Dr. Isabel Lee on

## 2021-10-13 ENCOUNTER — OFFICE VISIT (OUTPATIENT)
Dept: SURGERY | Facility: CLINIC | Age: 61
End: 2021-10-13
Payer: COMMERCIAL

## 2021-10-13 DIAGNOSIS — R33.9 URINARY RETENTION: Primary | ICD-10-CM

## 2021-10-13 PROCEDURE — 51798 US URINE CAPACITY MEASURE: CPT | Performed by: PHYSICIAN ASSISTANT

## 2021-10-13 PROCEDURE — 99213 OFFICE O/P EST LOW 20 MIN: CPT | Performed by: PHYSICIAN ASSISTANT

## 2021-10-13 NOTE — PROGRESS NOTES
Subjective:   Nicolas Dailey is a 62year old male who presents for Follow - Up (follow up for retention. Unable to urinate. Bladder scan done 572ml ) No changes to his voiding habits since last visit. Tirado removed 9/30.   He denies urinary frequenc capsule 5   • bumetanide 2 MG Oral Tab Take 1 tablet (2 mg total) by mouth daily. 60 tablet 11   • tamsulosin (FLOMAX) cap Take 1 capsule (0.4 mg total) by mouth 2 (two) times a day.  Take 1/2 hour following the same meal each day 180 capsule 3   • torsemid 09/02/2021    CA 8.7 10/04/2021    MG 1.8 06/04/2021       Urinalysis Results (last three years):  Recent Labs     06/03/21  0941 07/01/21  1227 09/16/21  1524   COLORUR Yellow Yellow Yellow   CLARITY Hazy* Clear Hazy*   SPECGRAVITY 1.018 1.015 1.009   ROSY

## 2021-10-14 ENCOUNTER — OFFICE VISIT (OUTPATIENT)
Dept: HEMATOLOGY/ONCOLOGY | Age: 61
End: 2021-10-14
Attending: INTERNAL MEDICINE
Payer: COMMERCIAL

## 2021-10-14 ENCOUNTER — SOCIAL WORK SERVICES (OUTPATIENT)
Dept: HEMATOLOGY/ONCOLOGY | Facility: HOSPITAL | Age: 61
End: 2021-10-14

## 2021-10-14 VITALS
RESPIRATION RATE: 20 BRPM | BODY MASS INDEX: 31.07 KG/M2 | WEIGHT: 193.31 LBS | HEART RATE: 85 BPM | OXYGEN SATURATION: 97 % | SYSTOLIC BLOOD PRESSURE: 100 MMHG | DIASTOLIC BLOOD PRESSURE: 65 MMHG | TEMPERATURE: 99 F | HEIGHT: 65.98 IN

## 2021-10-14 DIAGNOSIS — C34.90 MALIGNANT NEOPLASM OF LUNG, UNSPECIFIED LATERALITY, UNSPECIFIED PART OF LUNG (HCC): Primary | ICD-10-CM

## 2021-10-14 DIAGNOSIS — Z51.12 ENCOUNTER FOR ANTINEOPLASTIC IMMUNOTHERAPY: Primary | ICD-10-CM

## 2021-10-14 DIAGNOSIS — C79.51 SECONDARY CANCER OF BONE (HCC): ICD-10-CM

## 2021-10-14 DIAGNOSIS — C34.92 PRIMARY MALIGNANT NEOPLASM OF LEFT LUNG METASTATIC TO OTHER SITE (HCC): ICD-10-CM

## 2021-10-14 DIAGNOSIS — F32.9 REACTIVE DEPRESSION: ICD-10-CM

## 2021-10-14 DIAGNOSIS — I48.92 ATRIAL FLUTTER, PAROXYSMAL (HCC): ICD-10-CM

## 2021-10-14 DIAGNOSIS — F17.200 SMOKER: ICD-10-CM

## 2021-10-14 DIAGNOSIS — R30.0 DYSURIA: ICD-10-CM

## 2021-10-14 DIAGNOSIS — F41.9 ANXIETY: ICD-10-CM

## 2021-10-14 PROCEDURE — 96413 CHEMO IV INFUSION 1 HR: CPT

## 2021-10-14 PROCEDURE — 99215 OFFICE O/P EST HI 40 MIN: CPT | Performed by: CLINICAL NURSE SPECIALIST

## 2021-10-14 PROCEDURE — 96375 TX/PRO/DX INJ NEW DRUG ADDON: CPT

## 2021-10-14 RX ORDER — CIPROFLOXACIN 500 MG/1
500 TABLET, FILM COATED ORAL 2 TIMES DAILY
Qty: 14 TABLET | Refills: 0 | Status: SHIPPED | OUTPATIENT
Start: 2021-10-14 | End: 2021-10-21

## 2021-10-14 NOTE — PROGRESS NOTES
Pt here for C9D1 keytruda + zometa.   Arrives Via wheelchair, accompanied by Self           Pregnancy screening: Not applicable    Modifications in dose or schedule: No     Frequency of blood return and site check throughout administration: Prior to adminis

## 2021-10-14 NOTE — PROGRESS NOTES
ANP Visit Note    Patient Name: Blane Dorman   YOB: 1960   Medical Record Number: [id number]   CSN: [id number]   Date of visit: 10/14/2021   Dennise Zuluaga MD   No primary care provider on file.      Chief Complaint/Reason for Visit:  Usha Hogue (chronic obstructive pulmonary disease) (HCC)    • Depression     Dr. Nestor Charles   • Essential hypertension, benign    • Former cigarette smoker    • High blood pressure    • Insomnia     Dr. Nestor Charles   • Metastatic lung cancer (metastasis from lung to other sit  Service: Not Asked        Blood Transfusions: Not Asked        Occupational Exposure: Not Asked        Hobby Hazards: Not Asked        Sleep Concern: Not Asked        Back Care: Not Asked        Bike Helmet: Not Asked        Self-Exams: Not Asked (30 mg total) by mouth every 12 (twelve) hours. , Disp: 90 tablet, Rfl: 0  •  HYDROcodone-acetaminophen  MG Oral Tab, Take 1-2 tablets by mouth every 6 (six) hours as needed for Pain., Disp: 120 tablet, Rfl: 0  •  gabapentin 300 MG Oral Cap, Take 2 ca ECOG 2    Physical Examination:  General: Patient is alert and oriented x 3, not in acute distress. Vital Signs:    10/14/21  1128   BP: 100/65   Pulse: 85   Resp: 20   Temp: 99.3 °F (37.4 °C)       HEENT: EOMs intact. PERRL. Oropharynx is clear.    Neck: Phosphatase 55 45 - 117 U/L    Bilirubin, Total 0.3 0.1 - 2.0 mg/dL    Total Protein 7.0 6.4 - 8.2 g/dL    Albumin 3.0 (L) 3.4 - 5.0 g/dL    Globulin  4.0 2.8 - 4.4 g/dL    A/G Ratio 0.8 (L) 1.0 - 2.0    FASTING No    ASSAY, THYROID STIM HORMONE    Collect anemia: will continue to follow   5.  Depression/Anxiety: will ask SW to see and assist in getting him hooked up with a therapist.   6. Smoker: patient not in any frame of mind to even discuss cessation at this time, I had met with him in the beginning and

## 2021-10-14 NOTE — PROGRESS NOTES
Sw met with patient to provide information on [facility]. Sw will remain available for any questions.

## 2021-10-14 NOTE — PROGRESS NOTES
Patient is here for APN assessment and cycle 9 of Keytruda. Patient c/o chronic edema in BLE. This make it very difficult for patient to walk. Saw Dr Katheryn Lane on 9/23. Started on Bumex. He has been following up with Dr Cheli Roper for hematuria.  Held Eliquis for 4

## 2021-10-18 ENCOUNTER — TELEPHONE (OUTPATIENT)
Dept: HEMATOLOGY/ONCOLOGY | Facility: HOSPITAL | Age: 61
End: 2021-10-18

## 2021-10-18 DIAGNOSIS — C34.92 PRIMARY MALIGNANT NEOPLASM OF LEFT LUNG METASTATIC TO OTHER SITE (HCC): ICD-10-CM

## 2021-10-18 DIAGNOSIS — C79.51 SECONDARY MALIGNANT NEOPLASM OF BONE (HCC): ICD-10-CM

## 2021-10-18 DIAGNOSIS — C79.51 SECONDARY CANCER OF BONE (HCC): ICD-10-CM

## 2021-10-18 RX ORDER — HYDROCODONE BITARTRATE AND ACETAMINOPHEN 10; 325 MG/1; MG/1
1-2 TABLET ORAL EVERY 6 HOURS PRN
Qty: 240 TABLET | Refills: 0 | Status: SHIPPED | OUTPATIENT
Start: 2021-10-18 | End: 2021-11-29

## 2021-10-18 NOTE — TELEPHONE ENCOUNTER
Lee Lerma call requesting a refill of Norco 10/325 mg. He has only few left and it is providing him with control he needs.

## 2021-10-22 ENCOUNTER — TELEPHONE (OUTPATIENT)
Dept: HEMATOLOGY/ONCOLOGY | Age: 61
End: 2021-10-22

## 2021-10-22 DIAGNOSIS — N39.0 UTI (URINARY TRACT INFECTION), BACTERIAL: Primary | ICD-10-CM

## 2021-10-22 DIAGNOSIS — A49.9 UTI (URINARY TRACT INFECTION), BACTERIAL: Primary | ICD-10-CM

## 2021-10-22 NOTE — TELEPHONE ENCOUNTER
Needs repeat UA/Culture to ensure clearance of Leclercia adecarboxylata, if cleared, no further antibiotics. Patient will come to OP tomorrow for urine.          Patient called asking if he should get his Ciprofloxacin 500  Mg refilled, if yes he would ne

## 2021-10-25 ENCOUNTER — APPOINTMENT (OUTPATIENT)
Dept: HEMATOLOGY/ONCOLOGY | Age: 61
End: 2021-10-25
Attending: INTERNAL MEDICINE
Payer: COMMERCIAL

## 2021-10-25 NOTE — PROGRESS NOTES
[facility] Radiation Treatment Management Note 1-5    Patient:  Shaheed Dumont  Age:  62year old  Visit Diagnosis:  Metastatic malignancy, primary TBD  Primary Rad/Onc:  Dr. Kevin Coyle    Site Delivered Dose (cGy) Prescribed Dos Post-Care Instructions: I reviewed with the patient in detail post-care instructions. Patient should avoid sun exposure and wear sun protection.

## 2021-11-01 DIAGNOSIS — G89.3 NEOPLASM RELATED PAIN: ICD-10-CM

## 2021-11-01 DIAGNOSIS — C79.51 SECONDARY CANCER OF BONE (HCC): ICD-10-CM

## 2021-11-01 DIAGNOSIS — C34.92 PRIMARY MALIGNANT NEOPLASM OF LEFT LUNG METASTATIC TO OTHER SITE (HCC): ICD-10-CM

## 2021-11-01 RX ORDER — MORPHINE SULFATE 30 MG/1
30 TABLET, FILM COATED, EXTENDED RELEASE ORAL EVERY 12 HOURS SCHEDULED
Qty: 90 TABLET | Refills: 0 | Status: SHIPPED | OUTPATIENT
Start: 2021-11-01 | End: 2021-11-23

## 2021-11-01 NOTE — TELEPHONE ENCOUNTER
Patient called for a refill of Morphine Sulfate 30 mg. Please call and advise when refill is placed. Thank you.

## 2021-11-05 ENCOUNTER — NURSE ONLY (OUTPATIENT)
Dept: SURGERY | Facility: CLINIC | Age: 61
End: 2021-11-05
Payer: COMMERCIAL

## 2021-11-05 ENCOUNTER — HOSPITAL ENCOUNTER (OUTPATIENT)
Dept: ULTRASOUND IMAGING | Age: 61
Discharge: HOME | End: 2021-11-05
Attending: PHYSICIAN ASSISTANT
Payer: COMMERCIAL

## 2021-11-05 DIAGNOSIS — R33.9 URINARY RETENTION: ICD-10-CM

## 2021-11-05 DIAGNOSIS — R33.9 URINE RETENTION: Primary | ICD-10-CM

## 2021-11-05 PROCEDURE — 51702 INSERT TEMP BLADDER CATH: CPT | Performed by: PHYSICIAN ASSISTANT

## 2021-11-05 PROCEDURE — 76770 US EXAM ABDO BACK WALL COMP: CPT | Performed by: PHYSICIAN ASSISTANT

## 2021-11-05 NOTE — PROGRESS NOTES
Identified patient's name, date of birth and his reason for the visit. While on standing position, removed his pants and undergarments. Safely assisted patient to the exam table. Penile area observed to be clean and pink.  Patient having difficulty laying d

## 2021-11-09 ENCOUNTER — OFFICE VISIT (OUTPATIENT)
Dept: SURGERY | Facility: CLINIC | Age: 61
End: 2021-11-09
Payer: COMMERCIAL

## 2021-11-09 DIAGNOSIS — R31.29 MICROSCOPIC HEMATURIA: ICD-10-CM

## 2021-11-09 DIAGNOSIS — R33.9 URINARY RETENTION: Primary | ICD-10-CM

## 2021-11-09 PROCEDURE — 99214 OFFICE O/P EST MOD 30 MIN: CPT | Performed by: PHYSICIAN ASSISTANT

## 2021-11-09 NOTE — PROGRESS NOTES
Subjective:   Kwaku Malik a 62year old male with metastatic lung cancer who presents for Follow - Up today. Renal ultrasound completed 11/5 that showed significant residual of ~1L, no hydronephrosis.  It was recommended that patient come in for fol tablet (5 mg total) by mouth 2 (two) times daily. 60 tablet 5   • Potassium Chloride ER 10 MEQ Oral Tab CR Take 1 tablet (10 mEq total) by mouth 2 (two) times daily.  60 tablet 5   • metoprolol tartrate 100 MG Oral Tab Take 100 mg by mouth 2 (two) times lalita SPECGRAVITY 1.018 1.015 1.009 1.020 1.015   PHURINE 8.0 7.0 6.0 7.0 7.0   PROUR 100 * Negative Negative 100 mg/dL* Negative   GLUUR Negative Negative Negative Negative Negative   KETUR Negative Negative Negative Negative Negative   BILUR Negative Negativ cyst in the upper pole. BLADDER:  Pre and postvoid volume of bladder was 1230 and 976 mL. A 2nd attempted voiding was performed and the postvoid volume remained 911 mL. OTHER:  Negative.             CONCLUSION:  There was significant retention of urine in

## 2021-11-09 NOTE — PATIENT INSTRUCTIONS
Cystoscopy    Cystoscopy is a test that allows your doctor to look at the inside of the bladder and the urethra using a thin, lighted instrument called a cystoscope. The cystoscope is inserted into your urethra and slowly advanced into the bladder.  Cystos assistants. The test is done in a special testing room in the doctor's office. You will need undress from the waste down, and you will be given a cloth or paper covering to use during the test. You will lie on your back on a special table.  Females will ha After the test  After the test, you may need to urinate frequently, with some burning during and after urination for a day or two. Drink lots of fluids to help minimize the burning and to prevent a urinary tract infection.  You may also see a tinge of b

## 2021-11-23 ENCOUNTER — OFFICE VISIT (OUTPATIENT)
Dept: HEMATOLOGY/ONCOLOGY | Age: 61
End: 2021-11-23
Attending: INTERNAL MEDICINE
Payer: COMMERCIAL

## 2021-11-23 ENCOUNTER — TELEPHONE (OUTPATIENT)
Dept: HEMATOLOGY/ONCOLOGY | Age: 61
End: 2021-11-23

## 2021-11-23 VITALS
HEIGHT: 65.98 IN | SYSTOLIC BLOOD PRESSURE: 99 MMHG | HEART RATE: 89 BPM | DIASTOLIC BLOOD PRESSURE: 57 MMHG | TEMPERATURE: 99 F | WEIGHT: 182.63 LBS | OXYGEN SATURATION: 93 % | RESPIRATION RATE: 20 BRPM | BODY MASS INDEX: 29.35 KG/M2

## 2021-11-23 DIAGNOSIS — C34.92 PRIMARY MALIGNANT NEOPLASM OF LEFT LUNG METASTATIC TO OTHER SITE (HCC): Primary | ICD-10-CM

## 2021-11-23 DIAGNOSIS — G89.3 NEOPLASM RELATED PAIN: ICD-10-CM

## 2021-11-23 DIAGNOSIS — C79.51 SECONDARY CANCER OF BONE (HCC): ICD-10-CM

## 2021-11-23 DIAGNOSIS — F17.200 SMOKER: ICD-10-CM

## 2021-11-23 DIAGNOSIS — T40.2X5A CONSTIPATION DUE TO OPIOID THERAPY: ICD-10-CM

## 2021-11-23 DIAGNOSIS — G47.9 SLEEPING DIFFICULTY: ICD-10-CM

## 2021-11-23 DIAGNOSIS — R53.83 FATIGUE DUE TO TREATMENT: ICD-10-CM

## 2021-11-23 DIAGNOSIS — K59.03 DRUG-INDUCED CONSTIPATION: ICD-10-CM

## 2021-11-23 DIAGNOSIS — R33.9 URINARY RETENTION: ICD-10-CM

## 2021-11-23 DIAGNOSIS — F51.04 PSYCHOPHYSIOLOGICAL INSOMNIA: ICD-10-CM

## 2021-11-23 DIAGNOSIS — M54.10 RADICULAR PAIN OF BOTH LOWER EXTREMITIES: ICD-10-CM

## 2021-11-23 DIAGNOSIS — F41.9 ANXIETY: ICD-10-CM

## 2021-11-23 DIAGNOSIS — K59.03 CONSTIPATION DUE TO OPIOID THERAPY: ICD-10-CM

## 2021-11-23 DIAGNOSIS — D49.2 LUMBAR SPINE TUMOR: ICD-10-CM

## 2021-11-23 DIAGNOSIS — R60.0 BILATERAL LEG EDEMA: ICD-10-CM

## 2021-11-23 DIAGNOSIS — Z51.12 ENCOUNTER FOR ANTINEOPLASTIC IMMUNOTHERAPY: ICD-10-CM

## 2021-11-23 DIAGNOSIS — F41.8 DEPRESSION WITH ANXIETY: ICD-10-CM

## 2021-11-23 DIAGNOSIS — D49.2 LUMBAR SPINE TUMOR: Primary | ICD-10-CM

## 2021-11-23 DIAGNOSIS — C79.51 SECONDARY MALIGNANT NEOPLASM OF BONE (HCC): ICD-10-CM

## 2021-11-23 DIAGNOSIS — F32.9 REACTIVE DEPRESSION: ICD-10-CM

## 2021-11-23 PROBLEM — Z51.5 PALLIATIVE CARE BY SPECIALIST: Status: ACTIVE | Noted: 2021-11-23

## 2021-11-23 PROCEDURE — 99215 OFFICE O/P EST HI 40 MIN: CPT | Performed by: CLINICAL NURSE SPECIALIST

## 2021-11-23 PROCEDURE — 99214 OFFICE O/P EST MOD 30 MIN: CPT | Performed by: NURSE PRACTITIONER

## 2021-11-23 RX ORDER — SENNOSIDES 8.6 MG
17.2 TABLET ORAL 2 TIMES DAILY
COMMUNITY

## 2021-11-23 RX ORDER — METOPROLOL TARTRATE 100 MG/1
100 TABLET ORAL 2 TIMES DAILY
Qty: 180 TABLET | Refills: 0 | Status: SHIPPED | OUTPATIENT
Start: 2021-11-23

## 2021-11-23 RX ORDER — QUETIAPINE 100 MG/1
100 TABLET, FILM COATED ORAL NIGHTLY
Qty: 90 TABLET | Refills: 3 | Status: SHIPPED | OUTPATIENT
Start: 2021-11-23

## 2021-11-23 RX ORDER — METOPROLOL TARTRATE 100 MG/1
100 TABLET ORAL 2 TIMES DAILY
Qty: 180 TABLET | Refills: 0 | Status: SHIPPED | OUTPATIENT
Start: 2021-11-23 | End: 2021-11-23 | Stop reason: DRUGHIGH

## 2021-11-23 RX ORDER — POTASSIUM CHLORIDE 750 MG/1
10 TABLET, EXTENDED RELEASE ORAL 2 TIMES DAILY
Qty: 180 TABLET | Refills: 1 | Status: SHIPPED | OUTPATIENT
Start: 2021-11-23

## 2021-11-23 RX ORDER — MORPHINE SULFATE 30 MG/1
30 TABLET, FILM COATED, EXTENDED RELEASE ORAL EVERY 8 HOURS SCHEDULED
Qty: 90 TABLET | Refills: 0 | Status: SHIPPED | OUTPATIENT
Start: 2021-11-23 | End: 2021-11-29

## 2021-11-23 RX ORDER — POLYETHYLENE GLYCOL 3350 17 G/17G
17 POWDER, FOR SOLUTION ORAL 2 TIMES DAILY
COMMUNITY

## 2021-11-23 NOTE — TELEPHONE ENCOUNTER
Patient called to update he had been taking Metoprol  mg, and now he is taking Metoprolol tartrate 100mg, he was not sure if this medication was discussed

## 2021-11-23 NOTE — PROGRESS NOTES
ANP Visit Note    Patient Name: Lynnette Kaye   YOB: 1960   Medical Record Number: [id number]   CSN: [id number]   Date of visit: 11/23/2021   Jodie Powell MD   No primary care provider on file.      Chief Complaint/Reason for Visit:  Romeo Kathleen Diagnosis Date   • Anxiety     Dr. Dillon Rios   • Back problem    • COPD [facility]chronic obstructive [facility] disease) ([facility]    • Depression     Dr. Dillon Rios   • Essential hypertension, benign    • Former cigarette smoker    • High blood pressure    • Insomnia     D Stress Concern: Not Asked        Weight Concern: Not Asked         Service: Not Asked        Blood Transfusions: Not Asked        Occupational Exposure: Not Asked        Hobby Hazards: Not Asked        Sleep Concern: Not Asked        Back Care: Not 5  •  apixaban 5 MG Oral Tab, Take 1 tablet (5 mg total) by mouth 2 (two) times daily. , Disp: 60 tablet, Rfl: 5  •  umeclidinium-vilanterol (ANORO ELLIPTA) 62.5-25 MCG/INH Inhalation Aerosol Powder, Breath Activated, Inhale 1 puff into the lungs daily. Ciara Gibson Result Value Ref Range    Glucose 109 (H) 70 - 99 mg/dL    Sodium 136 136 - 145 mmol/L    Potassium 3.5 3.5 - 5.1 mmol/L    Chloride 101 98 - 112 mmol/L    CO2 27.0 21.0 - 32.0 mmol/L    Anion Gap 8 0 - 18 mmol/L    BUN 19 (H) 7 - 18 mg/dL    Creatinine for him to see our LCSW for counseling. 5. Urinary retention: indwelling pham, needs TURP following with Dr Marky Carias   6.  LE weakness: CT scan he has history of L4 lesion concern for new spine disease causing the leg weakness and pain and urinary retenti

## 2021-11-23 NOTE — CONSULTS
Palliative Care Consult Note     Patient Name: Blane Dorman   YOB: 1960   Medical Record Number: [id number]   CSN: [id number]   Date of visit: 11/23/2021     Reason for Consult: Referred by Darinel Mendez for Symptom management    Chief Complaint/ complicating neoplastic disease     Thrombocytopenia (HCC)     Atrial flutter, paroxysmal (HCC)     Panlobular emphysema ([facility]     Palliative care by specialist     Medical History:  Past Medical History:   Diagnosis Date   • Anxiety     Dr. Elisabeth Chawla   • [facility] daily. 90 capsule 5   • bumetanide 2 MG Oral Tab Take 1 tablet (2 mg total) by mouth daily. 60 tablet 11   • tamsulosin (FLOMAX) cap Take 1 capsule (0.4 mg total) by mouth 2 (two) times a day.  Take 1/2 hour following the same meal each day 180 capsule 3 adjusting his sleep meds for now  Discussed importance of bowel prophylaxis using BID senna and miralax.   He can titrate as needed  Discussed strategies to improve fatigue but this should improve with better sleep and pain control  Discussed counseling res

## 2021-11-24 ENCOUNTER — APPOINTMENT (OUTPATIENT)
Dept: HEMATOLOGY/ONCOLOGY | Age: 61
End: 2021-11-24
Attending: INTERNAL MEDICINE
Payer: COMMERCIAL

## 2021-11-25 ENCOUNTER — HOSPITAL ENCOUNTER (EMERGENCY)
Facility: HOSPITAL | Age: 61
Discharge: HOME | End: 2021-11-25
Attending: EMERGENCY MEDICINE
Payer: COMMERCIAL

## 2021-11-25 VITALS
HEIGHT: 66 IN | DIASTOLIC BLOOD PRESSURE: 60 MMHG | RESPIRATION RATE: 20 BRPM | OXYGEN SATURATION: 94 % | WEIGHT: 185 LBS | TEMPERATURE: 98 F | SYSTOLIC BLOOD PRESSURE: 110 MMHG | BODY MASS INDEX: 29.73 KG/M2 | HEART RATE: 75 BPM

## 2021-11-25 DIAGNOSIS — N39.0 URINARY TRACT INFECTION WITHOUT HEMATURIA, SITE UNSPECIFIED: ICD-10-CM

## 2021-11-25 DIAGNOSIS — T83.9XXA FOLEY CATHETER PROBLEM, INITIAL ENCOUNTER (HCC): Primary | ICD-10-CM

## 2021-11-25 PROCEDURE — 87086 URINE CULTURE/COLONY COUNT: CPT | Performed by: EMERGENCY MEDICINE

## 2021-11-25 PROCEDURE — 99283 EMERGENCY DEPT VISIT LOW MDM: CPT

## 2021-11-25 PROCEDURE — 87077 CULTURE AEROBIC IDENTIFY: CPT | Performed by: EMERGENCY MEDICINE

## 2021-11-25 PROCEDURE — 81001 URINALYSIS AUTO W/SCOPE: CPT | Performed by: EMERGENCY MEDICINE

## 2021-11-25 RX ORDER — CIPROFLOXACIN 500 MG/1
500 TABLET, FILM COATED ORAL 2 TIMES DAILY
Qty: 20 TABLET | Refills: 0 | Status: SHIPPED | OUTPATIENT
Start: 2021-11-25 | End: 2021-12-30

## 2021-11-25 NOTE — ED PROVIDER NOTES
Patient Seen in: [facility] Emergency Department      History   Patient presents with:  Urinary Symptoms    Stated Complaint: catheter isn't draining, + blood clots     Subjective:   HPI    52-year-old male with history of metastatic squamous cell, isn't draining, + blood clots   Other systems are as noted in HPI. Constitutional and vital signs reviewed. All other systems reviewed and negative except as noted above.     Physical Exam     ED Triage Vitals [11/25/21 1413]   BP 98/64   Pulse 75   R ER if any change worsening symptoms. Patient well-appearing discharged good condition.                              Disposition and Plan     Clinical Impression:  Tirado catheter problem, initial encounter ([facility]  (primary encounter diagnosis)  Urinary tract

## 2021-11-26 ENCOUNTER — DOCUMENTATION ONLY (OUTPATIENT)
Dept: HEMATOLOGY/ONCOLOGY | Facility: HOSPITAL | Age: 61
End: 2021-11-26

## 2021-11-29 ENCOUNTER — TELEPHONE (OUTPATIENT)
Dept: HEMATOLOGY/ONCOLOGY | Facility: HOSPITAL | Age: 61
End: 2021-11-29

## 2021-11-29 ENCOUNTER — PATIENT MESSAGE (OUTPATIENT)
Dept: HEMATOLOGY/ONCOLOGY | Age: 61
End: 2021-11-29

## 2021-11-29 ENCOUNTER — HOSPITAL ENCOUNTER (EMERGENCY)
Facility: HOSPITAL | Age: 61
Discharge: HOME | End: 2021-11-30
Attending: EMERGENCY MEDICINE
Payer: COMMERCIAL

## 2021-11-29 ENCOUNTER — TELEPHONE (OUTPATIENT)
Dept: SURGERY | Facility: CLINIC | Age: 61
End: 2021-11-29

## 2021-11-29 ENCOUNTER — HOSPITAL ENCOUNTER (OUTPATIENT)
Dept: CT IMAGING | Age: 61
Discharge: HOME | End: 2021-11-29
Attending: CLINICAL NURSE SPECIALIST
Payer: COMMERCIAL

## 2021-11-29 DIAGNOSIS — C79.51 SECONDARY MALIGNANT NEOPLASM OF BONE (HCC): ICD-10-CM

## 2021-11-29 DIAGNOSIS — T83.9XXD PROBLEM WITH FOLEY CATHETER, SUBSEQUENT ENCOUNTER: ICD-10-CM

## 2021-11-29 DIAGNOSIS — M54.10 RADICULAR PAIN OF BOTH LOWER EXTREMITIES: ICD-10-CM

## 2021-11-29 DIAGNOSIS — C79.51 SECONDARY CANCER OF BONE (HCC): ICD-10-CM

## 2021-11-29 DIAGNOSIS — G89.3 NEOPLASM RELATED PAIN: ICD-10-CM

## 2021-11-29 DIAGNOSIS — C34.92 PRIMARY MALIGNANT NEOPLASM OF LEFT LUNG METASTATIC TO OTHER SITE (HCC): ICD-10-CM

## 2021-11-29 DIAGNOSIS — D49.2 LUMBAR SPINE TUMOR: ICD-10-CM

## 2021-11-29 DIAGNOSIS — R31.9 HEMATURIA, UNSPECIFIED TYPE: Primary | ICD-10-CM

## 2021-11-29 PROCEDURE — 81001 URINALYSIS AUTO W/SCOPE: CPT | Performed by: EMERGENCY MEDICINE

## 2021-11-29 PROCEDURE — 87086 URINE CULTURE/COLONY COUNT: CPT | Performed by: EMERGENCY MEDICINE

## 2021-11-29 PROCEDURE — 87077 CULTURE AEROBIC IDENTIFY: CPT | Performed by: EMERGENCY MEDICINE

## 2021-11-29 PROCEDURE — 99285 EMERGENCY DEPT VISIT HI MDM: CPT

## 2021-11-29 PROCEDURE — 81015 MICROSCOPIC EXAM OF URINE: CPT | Performed by: EMERGENCY MEDICINE

## 2021-11-29 PROCEDURE — 71260 CT THORAX DX C+: CPT | Performed by: CLINICAL NURSE SPECIALIST

## 2021-11-29 PROCEDURE — 86900 BLOOD TYPING SEROLOGIC ABO: CPT | Performed by: EMERGENCY MEDICINE

## 2021-11-29 PROCEDURE — 99283 EMERGENCY DEPT VISIT LOW MDM: CPT

## 2021-11-29 PROCEDURE — 74177 CT ABD & PELVIS W/CONTRAST: CPT | Performed by: CLINICAL NURSE SPECIALIST

## 2021-11-29 PROCEDURE — 85730 THROMBOPLASTIN TIME PARTIAL: CPT | Performed by: EMERGENCY MEDICINE

## 2021-11-29 PROCEDURE — 86850 RBC ANTIBODY SCREEN: CPT | Performed by: EMERGENCY MEDICINE

## 2021-11-29 PROCEDURE — 85610 PROTHROMBIN TIME: CPT | Performed by: EMERGENCY MEDICINE

## 2021-11-29 PROCEDURE — 86901 BLOOD TYPING SEROLOGIC RH(D): CPT | Performed by: EMERGENCY MEDICINE

## 2021-11-29 PROCEDURE — 80053 COMPREHEN METABOLIC PANEL: CPT | Performed by: EMERGENCY MEDICINE

## 2021-11-29 PROCEDURE — 85025 COMPLETE CBC W/AUTO DIFF WBC: CPT | Performed by: EMERGENCY MEDICINE

## 2021-11-29 PROCEDURE — 36415 COLL VENOUS BLD VENIPUNCTURE: CPT

## 2021-11-29 RX ORDER — HYDROCODONE BITARTRATE AND ACETAMINOPHEN 10; 325 MG/1; MG/1
1-2 TABLET ORAL EVERY 6 HOURS PRN
Qty: 240 TABLET | Refills: 0 | Status: SHIPPED | OUTPATIENT
Start: 2021-11-29

## 2021-11-29 RX ORDER — MORPHINE SULFATE 30 MG/1
30 TABLET, FILM COATED, EXTENDED RELEASE ORAL EVERY 8 HOURS SCHEDULED
Qty: 90 TABLET | Refills: 0 | Status: SHIPPED | OUTPATIENT
Start: 2021-11-29

## 2021-11-29 RX ORDER — HYDROCODONE BITARTRATE AND ACETAMINOPHEN 10; 325 MG/1; MG/1
1-2 TABLET ORAL EVERY 6 HOURS PRN
Qty: 240 TABLET | Refills: 0 | Status: CANCELLED | OUTPATIENT
Start: 2021-11-29

## 2021-11-29 NOTE — Clinical Note
Date:11/29/2021  Patient:Hitesh Mcgrath  Attending Provider:No att. providers found    Ericktom Hurstmik made the decision to leave the emergency department independently without the approval of the ED , or other emergency department staff.

## 2021-11-29 NOTE — TELEPHONE ENCOUNTER
Pt states he is gushing blood filling up the catheter bags states he filled up 3 bags of catheter's before he could manage the blood please advise

## 2021-11-29 NOTE — TELEPHONE ENCOUNTER
Left message on his voicemail that the scan looks worse with new growth of the cancer. I asked him to call us back tomorrow to discuss. Second line options exist but are harder and less certain to work.   Will need to see if he is strong enough to pursue

## 2021-11-29 NOTE — ED INITIAL ASSESSMENT (HPI)
Patient states he is bleeding from urinary catheter. Patient had urinary catheter on Saturday. This morning patient states \"I woke up to a blood bath. \" Denies dizziness.

## 2021-11-29 NOTE — TELEPHONE ENCOUNTER
RN received a call back from patient. Per patient, he was bleeding since Saturday. He held his anticoagulant meds, went to ER and Tirado was replaced and was later discharged. No bleeding on Sunday but this AM (Monday), he has more bleeding.  He completed hi

## 2021-11-30 ENCOUNTER — TELEPHONE (OUTPATIENT)
Dept: HEMATOLOGY/ONCOLOGY | Facility: HOSPITAL | Age: 61
End: 2021-11-30

## 2021-11-30 VITALS
RESPIRATION RATE: 18 BRPM | DIASTOLIC BLOOD PRESSURE: 60 MMHG | HEART RATE: 80 BPM | BODY MASS INDEX: 29.73 KG/M2 | TEMPERATURE: 97 F | HEIGHT: 66 IN | SYSTOLIC BLOOD PRESSURE: 102 MMHG | WEIGHT: 185 LBS | OXYGEN SATURATION: 97 %

## 2021-11-30 DIAGNOSIS — I48.92 ATRIAL FLUTTER, PAROXYSMAL (HCC): ICD-10-CM

## 2021-11-30 DIAGNOSIS — C79.51 SECONDARY CANCER OF BONE (HCC): ICD-10-CM

## 2021-11-30 DIAGNOSIS — R31.0 GROSS HEMATURIA: Primary | ICD-10-CM

## 2021-11-30 DIAGNOSIS — G89.3 NEOPLASM RELATED PAIN: ICD-10-CM

## 2021-11-30 DIAGNOSIS — C79.51 SECONDARY MALIGNANT NEOPLASM OF BONE (HCC): ICD-10-CM

## 2021-11-30 DIAGNOSIS — C34.92 PRIMARY MALIGNANT NEOPLASM OF LEFT LUNG METASTATIC TO OTHER SITE (HCC): ICD-10-CM

## 2021-11-30 RX ORDER — BUMETANIDE 2 MG/1
2 TABLET ORAL DAILY
Qty: 60 TABLET | Refills: 11 | Status: CANCELLED | OUTPATIENT
Start: 2021-11-30

## 2021-11-30 RX ORDER — GABAPENTIN 300 MG/1
600 CAPSULE ORAL 3 TIMES DAILY
Qty: 90 CAPSULE | Refills: 5 | Status: SHIPPED | OUTPATIENT
Start: 2021-11-30

## 2021-11-30 RX ORDER — HYDROCODONE BITARTRATE AND ACETAMINOPHEN 10; 325 MG/1; MG/1
1-2 TABLET ORAL EVERY 6 HOURS PRN
Qty: 240 TABLET | Refills: 0 | Status: CANCELLED | OUTPATIENT
Start: 2021-11-30

## 2021-11-30 NOTE — ED PROVIDER NOTES
Patient Seen in: [facility] Emergency Department      History   Patient presents with:  Urinary Symptoms    Stated Complaint: bleeding into cath    Subjective:   HPI    Patient is a 67-year-old male presents emergency room with a history of known me beer per week      Comment: 4 cans of beer/day x 4-5 days of the week- quit at baljinder time per pt    Drug use: No             Review of Systems    Positive for stated complaint: bleeding into cath  Other systems are as noted in HPI.   Constitutional and appropriately.            ED Course     Labs Reviewed   COMP METABOLIC PANEL (14) - Abnormal; Notable for the following components:       Result Value    BUN 22 (*)     Creatinine 1.48 (*)     GFR, Non- 51 (*)     GFR, -American 59 (* EGOQON[[id number]]                                  Final result                 Please view results for these tests on the individual orders.    ABORH (BLOOD TYPE)   ANTIBODY SCREEN   URINE CULTURE, ROUTINE                   MDM        00:06 patient is stil time.  He states he would like to follow-up with his oncologist tomorrow. Patient is aware of the risk limitations of leaving the hospital at this time.   Patient's hemoglobin is unchanged from a blood count that was done several days ago the patient's BUN

## 2021-11-30 NOTE — TELEPHONE ENCOUNTER
Got his meds, he could not fill the MS Contin as too soon, he has enough until Friday. Getting IVC filter placed tomorrow, will arrange for him to see Dr Tricia Lopez to discuss treatment options.   REDDY    Patient needs a refill on his Morphine ER 30 mg but w

## 2021-11-30 NOTE — TELEPHONE ENCOUNTER
Called Chris Campos again, his bag is not draining out when he unclamps it. Urine in bag bloody and with some clots.   Talked him thro checking all connections sites for obstruction, to agitate the bag a little to see if a clot is blocking the drainage(after agita

## 2021-11-30 NOTE — TELEPHONE ENCOUNTER
Michael Gatica returned call. He stopped the Apixiban on Saturday because of the bleeding. He admits he probably should have stayed in the hospital but his mom is sick with a \"stomach issue\" and he needs to take care of her.      Discussed need for IVC filter val

## 2021-12-01 ENCOUNTER — TELEPHONE (OUTPATIENT)
Dept: HEMATOLOGY/ONCOLOGY | Facility: HOSPITAL | Age: 61
End: 2021-12-01

## 2021-12-01 ENCOUNTER — HOSPITAL ENCOUNTER (OUTPATIENT)
Dept: INTERVENTIONAL RADIOLOGY/VASCULAR | Facility: HOSPITAL | Age: 61
Discharge: HOME | End: 2021-12-01
Attending: INTERNAL MEDICINE | Admitting: INTERNAL MEDICINE
Payer: COMMERCIAL

## 2021-12-01 VITALS
OXYGEN SATURATION: 98 % | DIASTOLIC BLOOD PRESSURE: 59 MMHG | RESPIRATION RATE: 13 BRPM | TEMPERATURE: 98 F | HEART RATE: 87 BPM | SYSTOLIC BLOOD PRESSURE: 100 MMHG

## 2021-12-01 DIAGNOSIS — R31.0 GROSS HEMATURIA: ICD-10-CM

## 2021-12-01 DIAGNOSIS — I48.92 ATRIAL FLUTTER, PAROXYSMAL (HCC): ICD-10-CM

## 2021-12-01 PROCEDURE — 06H03DZ INSERTION OF INTRALUMINAL DEVICE INTO INFERIOR VENA CAVA, PERCUTANEOUS APPROACH: ICD-10-PCS | Performed by: RADIOLOGY

## 2021-12-01 PROCEDURE — 85610 PROTHROMBIN TIME: CPT

## 2021-12-01 PROCEDURE — 37191 INS ENDOVAS VENA CAVA FILTR: CPT

## 2021-12-01 RX ORDER — LIDOCAINE HYDROCHLORIDE 10 MG/ML
INJECTION, SOLUTION INFILTRATION; PERINEURAL
Status: COMPLETED
Start: 2021-12-01 | End: 2021-12-01

## 2021-12-01 RX ORDER — SODIUM CHLORIDE 9 MG/ML
INJECTION, SOLUTION INTRAVENOUS CONTINUOUS
Status: DISCONTINUED | OUTPATIENT
Start: 2021-12-01 | End: 2021-12-01

## 2021-12-01 RX ORDER — HEPARIN SODIUM 5000 [USP'U]/ML
INJECTION, SOLUTION INTRAVENOUS; SUBCUTANEOUS
Status: COMPLETED
Start: 2021-12-01 | End: 2021-12-01

## 2021-12-01 RX ORDER — MORPHINE SULFATE 30 MG/1
30 TABLET, FILM COATED, EXTENDED RELEASE ORAL ONCE
Status: COMPLETED | OUTPATIENT
Start: 2021-12-01 | End: 2021-12-01

## 2021-12-01 RX ORDER — MIDAZOLAM HYDROCHLORIDE 1 MG/ML
INJECTION INTRAMUSCULAR; INTRAVENOUS
Status: COMPLETED
Start: 2021-12-01 | End: 2021-12-01

## 2021-12-01 RX ADMIN — SODIUM CHLORIDE: 9 INJECTION, SOLUTION INTRAVENOUS at 13:15:00

## 2021-12-01 RX ADMIN — MORPHINE SULFATE 30 MG: 30 TABLET, FILM COATED, EXTENDED RELEASE ORAL at 16:00:00

## 2021-12-01 NOTE — H&P
[address] Patient Status:  Outpatient in a Bed    10/22/1960 MRN [id number]   Location [address] [facility] Attending Kali Alexander MD   Hosp Day # 0 PCP Jodie Powell MD     Admitting noble nettles        Allergies/Medications: Allergies:    Pcn [Penicillins]           Comment:Allergy as a child    Medications:  No current outpatient medications on file.     Physical Exam & Review of Systems:   Physical Exam:    BP 95/54 (BP Location: [facility]

## 2021-12-01 NOTE — PROGRESS NOTES
Pt received s/p IVC filter placement and replacement of indwelling pham catheter. Right femoral venous access site CDI, no bleeding or drainage noted on dressing.  Pt c/o severe lower back pain which is chronic, unrelieved by changing positions and getting

## 2021-12-02 ENCOUNTER — NURSE ONLY (OUTPATIENT)
Dept: HEMATOLOGY/ONCOLOGY | Age: 61
End: 2021-12-02
Attending: INTERNAL MEDICINE
Payer: COMMERCIAL

## 2021-12-02 ENCOUNTER — OFFICE VISIT (OUTPATIENT)
Dept: HEMATOLOGY/ONCOLOGY | Age: 61
End: 2021-12-02
Attending: INTERNAL MEDICINE
Payer: COMMERCIAL

## 2021-12-02 VITALS
OXYGEN SATURATION: 93 % | HEIGHT: 65.98 IN | DIASTOLIC BLOOD PRESSURE: 66 MMHG | TEMPERATURE: 99 F | HEART RATE: 102 BPM | RESPIRATION RATE: 16 BRPM | WEIGHT: 181 LBS | BODY MASS INDEX: 29.09 KG/M2 | SYSTOLIC BLOOD PRESSURE: 107 MMHG

## 2021-12-02 DIAGNOSIS — D64.9 ANEMIA, NORMOCYTIC NORMOCHROMIC: ICD-10-CM

## 2021-12-02 DIAGNOSIS — C79.51 SECONDARY CANCER OF BONE (HCC): ICD-10-CM

## 2021-12-02 DIAGNOSIS — C34.92 PRIMARY MALIGNANT NEOPLASM OF LEFT LUNG METASTATIC TO OTHER SITE (HCC): Primary | ICD-10-CM

## 2021-12-02 DIAGNOSIS — C79.51 SECONDARY MALIGNANT NEOPLASM OF BONE (HCC): ICD-10-CM

## 2021-12-02 DIAGNOSIS — C34.92 PRIMARY MALIGNANT NEOPLASM OF LEFT LUNG METASTATIC TO OTHER SITE (HCC): ICD-10-CM

## 2021-12-02 PROCEDURE — 85025 COMPLETE CBC W/AUTO DIFF WBC: CPT

## 2021-12-02 PROCEDURE — 99214 OFFICE O/P EST MOD 30 MIN: CPT | Performed by: INTERNAL MEDICINE

## 2021-12-02 PROCEDURE — 83550 IRON BINDING TEST: CPT

## 2021-12-02 PROCEDURE — 36415 COLL VENOUS BLD VENIPUNCTURE: CPT

## 2021-12-02 PROCEDURE — 80053 COMPREHEN METABOLIC PANEL: CPT

## 2021-12-02 PROCEDURE — 83540 ASSAY OF IRON: CPT

## 2021-12-02 PROCEDURE — 82728 ASSAY OF FERRITIN: CPT

## 2021-12-02 RX ORDER — DIPHENHYDRAMINE HYDROCHLORIDE 50 MG/ML
50 INJECTION INTRAMUSCULAR; INTRAVENOUS ONCE
Status: CANCELLED | OUTPATIENT
Start: 2021-12-06

## 2021-12-02 RX ORDER — NITROFURANTOIN 25; 75 MG/1; MG/1
100 CAPSULE ORAL 2 TIMES DAILY
Qty: 20 CAPSULE | Refills: 0 | Status: SHIPPED | OUTPATIENT
Start: 2021-12-02 | End: 2021-12-30 | Stop reason: ALTCHOICE

## 2021-12-02 RX ORDER — DEXAMETHASONE 4 MG/1
TABLET ORAL
Qty: 12 TABLET | Refills: 11 | Status: SHIPPED | OUTPATIENT
Start: 2021-12-02

## 2021-12-02 NOTE — PROGRESS NOTES
Patient is here for MD f/u for Lung cancer. Patient was seen in the ER twice since last week due to clotting in pham catheter. Patient c/o chronic pain in his back and it radiates down both legs. Patient is on Gabapentin, Morphine and Hydrocodone.  Patient

## 2021-12-03 ENCOUNTER — TELEPHONE (OUTPATIENT)
Dept: HEMATOLOGY/ONCOLOGY | Facility: HOSPITAL | Age: 61
End: 2021-12-03

## 2021-12-03 ENCOUNTER — ORDER TRANSCRIPTION (OUTPATIENT)
Dept: ADMINISTRATIVE | Facility: HOSPITAL | Age: 61
End: 2021-12-03

## 2021-12-03 DIAGNOSIS — Z01.818 PRE-OP TESTING: Primary | ICD-10-CM

## 2021-12-03 DIAGNOSIS — Z11.59 ENCOUNTER FOR SCREENING FOR OTHER VIRAL DISEASES: ICD-10-CM

## 2021-12-03 RX ORDER — ONDANSETRON HYDROCHLORIDE 8 MG/1
8 TABLET, FILM COATED ORAL EVERY 8 HOURS PRN
Qty: 30 TABLET | Refills: 3 | Status: SHIPPED | OUTPATIENT
Start: 2021-12-03

## 2021-12-03 RX ORDER — PROCHLORPERAZINE MALEATE 10 MG
10 TABLET ORAL EVERY 6 HOURS PRN
Qty: 30 TABLET | Refills: 3 | Status: SHIPPED | OUTPATIENT
Start: 2021-12-03

## 2021-12-03 RX ORDER — SULFAMETHOXAZOLE AND TRIMETHOPRIM 800; 160 MG/1; MG/1
1 TABLET ORAL 2 TIMES DAILY
Qty: 14 TABLET | Refills: 0 | Status: SHIPPED | OUTPATIENT
Start: 2021-12-03 | End: 2021-12-30

## 2021-12-03 NOTE — TELEPHONE ENCOUNTER
Called pt to coordinate CT biopsy; scheduled for 12/10/2021 at [facility] at 11 AM, arrival by 10 AM. Covid test scheduled for [facility] on 12/7/2021. Pt verbalized understanding and will call office with questions.

## 2021-12-03 NOTE — TELEPHONE ENCOUNTER
Spoke to patient. Case discussed in  conference. Given bilat renal masses - this is an unusual though not unheard-of site for lung cancer mets.   It was thought that biopsy would be helpful to rule out another primary - odds remain highest this is lung

## 2021-12-06 ENCOUNTER — SOCIAL WORK SERVICES (OUTPATIENT)
Dept: HEMATOLOGY/ONCOLOGY | Facility: HOSPITAL | Age: 61
End: 2021-12-06

## 2021-12-06 ENCOUNTER — TELEPHONE (OUTPATIENT)
Dept: CT IMAGING | Facility: HOSPITAL | Age: 61
End: 2021-12-06

## 2021-12-06 ENCOUNTER — OFFICE VISIT (OUTPATIENT)
Dept: HEMATOLOGY/ONCOLOGY | Age: 61
End: 2021-12-06
Attending: INTERNAL MEDICINE
Payer: COMMERCIAL

## 2021-12-06 VITALS
HEIGHT: 64.96 IN | SYSTOLIC BLOOD PRESSURE: 88 MMHG | WEIGHT: 182 LBS | HEART RATE: 97 BPM | OXYGEN SATURATION: 98 % | TEMPERATURE: 97 F | BODY MASS INDEX: 30.32 KG/M2 | DIASTOLIC BLOOD PRESSURE: 53 MMHG | RESPIRATION RATE: 18 BRPM

## 2021-12-06 VITALS — WEIGHT: 182 LBS | BODY MASS INDEX: 30.32 KG/M2 | HEIGHT: 65 IN

## 2021-12-06 VITALS
SYSTOLIC BLOOD PRESSURE: 100 MMHG | HEART RATE: 76 BPM | DIASTOLIC BLOOD PRESSURE: 64 MMHG | RESPIRATION RATE: 14 BRPM | OXYGEN SATURATION: 94 %

## 2021-12-06 DIAGNOSIS — C34.92 PRIMARY MALIGNANT NEOPLASM OF LEFT LUNG METASTATIC TO OTHER SITE (HCC): ICD-10-CM

## 2021-12-06 DIAGNOSIS — Z71.9 ENCOUNTER FOR HEALTH EDUCATION: Primary | ICD-10-CM

## 2021-12-06 DIAGNOSIS — C34.92 PRIMARY MALIGNANT NEOPLASM OF LEFT LUNG METASTATIC TO OTHER SITE (HCC): Primary | ICD-10-CM

## 2021-12-06 DIAGNOSIS — C79.51 SECONDARY CANCER OF BONE (HCC): ICD-10-CM

## 2021-12-06 DIAGNOSIS — C34.90 MALIGNANT NEOPLASM OF LUNG, UNSPECIFIED LATERALITY, UNSPECIFIED PART OF LUNG (HCC): ICD-10-CM

## 2021-12-06 DIAGNOSIS — C79.51 SECONDARY MALIGNANT NEOPLASM OF BONE (HCC): ICD-10-CM

## 2021-12-06 PROCEDURE — 96375 TX/PRO/DX INJ NEW DRUG ADDON: CPT

## 2021-12-06 PROCEDURE — 99214 OFFICE O/P EST MOD 30 MIN: CPT | Performed by: CLINICAL NURSE SPECIALIST

## 2021-12-06 PROCEDURE — 96413 CHEMO IV INFUSION 1 HR: CPT

## 2021-12-06 PROCEDURE — 96376 TX/PRO/DX INJ SAME DRUG ADON: CPT

## 2021-12-06 PROCEDURE — 96367 TX/PROPH/DG ADDL SEQ IV INF: CPT

## 2021-12-06 RX ORDER — DIPHENHYDRAMINE HYDROCHLORIDE 50 MG/ML
50 INJECTION INTRAMUSCULAR; INTRAVENOUS ONCE
Status: COMPLETED | OUTPATIENT
Start: 2021-12-06 | End: 2021-12-06

## 2021-12-06 RX ADMIN — DIPHENHYDRAMINE HYDROCHLORIDE 50 MG: 50 INJECTION INTRAMUSCULAR; INTRAVENOUS at 12:19:00

## 2021-12-06 NOTE — PROGRESS NOTES
SW checked in with patient. Patient had received information on [facility] services previously and reports he plans to call this week. Sw provided support and a bag of akua donation.

## 2021-12-06 NOTE — PROGRESS NOTES
Chemotherapy Education    Learner:  Patient    Barriers / Limitations:  None    Chemotherapy education goals:  · Learn the drug names  · Administration schedule  · Routes of administration  · Treatment setting    Drug names:  Docetaxel     Chemotherapy act or problems:  Achieved    Comments:    Treatment Effects on Emotional Status:    Potential mood changes, depression, nervousness, difficulty sleeping:  Achieved    Importance of support system:  Achieved    Notify MD/RN of any emotional changes:  Achieved

## 2021-12-06 NOTE — TELEPHONE ENCOUNTER
In preparation for kidney biopsy scheduled for 12/10, I left a voice message asking that pt confirm with Dr Dilma Hewitt the okay to hold Apaxiban for 48 hours prior to procedure. Callback # provided for any questions.

## 2021-12-06 NOTE — PROGRESS NOTES
Pt here for C1D1 and infed.   Arrives Ambulating with cane, accompanied by Self           Pregnancy screening: Not applicable    Modifications in dose or schedule: No     Frequency of blood return and site check throughout administration: Prior to [employer]

## 2021-12-07 ENCOUNTER — PROCEDURE (OUTPATIENT)
Dept: SURGERY | Facility: CLINIC | Age: 61
End: 2021-12-07
Payer: COMMERCIAL

## 2021-12-07 ENCOUNTER — LAB ENCOUNTER (OUTPATIENT)
Dept: LAB | Age: 61
End: 2021-12-07
Attending: INTERNAL MEDICINE
Payer: COMMERCIAL

## 2021-12-07 ENCOUNTER — TELEPHONE (OUTPATIENT)
Dept: HEMATOLOGY/ONCOLOGY | Facility: HOSPITAL | Age: 61
End: 2021-12-07

## 2021-12-07 VITALS — DIASTOLIC BLOOD PRESSURE: 76 MMHG | HEART RATE: 79 BPM | TEMPERATURE: 97 F | SYSTOLIC BLOOD PRESSURE: 123 MMHG

## 2021-12-07 DIAGNOSIS — R33.9 URINE RETENTION: ICD-10-CM

## 2021-12-07 DIAGNOSIS — R31.29 MICROSCOPIC HEMATURIA: ICD-10-CM

## 2021-12-07 DIAGNOSIS — C34.92 MALIGNANT NEOPLASM OF LEFT LUNG, UNSPECIFIED PART OF LUNG (HCC): Primary | ICD-10-CM

## 2021-12-07 DIAGNOSIS — Z11.59 ENCOUNTER FOR SCREENING FOR OTHER VIRAL DISEASES: ICD-10-CM

## 2021-12-07 DIAGNOSIS — Z01.818 PRE-OP TESTING: ICD-10-CM

## 2021-12-07 PROCEDURE — 3078F DIAST BP <80 MM HG: CPT | Performed by: UROLOGY

## 2021-12-07 PROCEDURE — 52000 CYSTOURETHROSCOPY: CPT | Performed by: UROLOGY

## 2021-12-07 PROCEDURE — 3074F SYST BP LT 130 MM HG: CPT | Performed by: UROLOGY

## 2021-12-07 NOTE — TELEPHONE ENCOUNTER
Toxicities: C1 D1 Ramacirumab/Docetaxel on 12/6/2021    Jeffrey Lopez is feeling well. No side effects at this time. I encouraged him to please call the office if he is not feeling well or he has any questions or concerns. He thanked me for calling.

## 2021-12-07 NOTE — PROGRESS NOTES
Rooming Clinician:     Pelon Galdamez is a 62year old male.   Patient presents with:  Procedure: cystoscopy        HPI:     Patient with metastatic lung cancer which is progressing now with retroperitoneal and renal metastases and chronic urinary retenti capsule (0.5 mg total) by mouth daily. 90 capsule 3   • bumetanide 2 MG Oral Tab Take 1 tablet (2 mg total) by mouth daily. 60 tablet 11   • tamsulosin (FLOMAX) cap Take 1 capsule (0.4 mg total) by mouth 2 (two) times a day.  Take 1/2 hour following the kiet 45.00        Pack years: 39        Types: Cigarettes        Start date: 1/8/1981      Smokeless tobacco: Never Used    Vaping Use      Vaping Use: Never used    Alcohol use: Not Currently      Comment: DENIES    Drug use: No       REVIEW OF SYSTEMS:     SEBASTIEN tumors seen. The patient tolerated the procedure well. 12 [place] Tirado catheter was then reinserted into the bladder and placed to leg bag drainage. He was advised to continue current antibiotic therapy and given  the usual postoperative instructions. hematuria    Urine retention        Follow up examination in 1 month for catheter exchange    The patient indicates understanding of these issues and agrees to the plan. Claude Berger M.D.   Urology

## 2021-12-09 RX ORDER — NALOXONE HYDROCHLORIDE 0.4 MG/ML
80 INJECTION, SOLUTION INTRAMUSCULAR; INTRAVENOUS; SUBCUTANEOUS AS NEEDED
OUTPATIENT
Start: 2021-12-09

## 2021-12-09 RX ORDER — MIDAZOLAM HYDROCHLORIDE 1 MG/ML
1 INJECTION INTRAMUSCULAR; INTRAVENOUS EVERY 5 MIN PRN
OUTPATIENT
Start: 2021-12-09

## 2021-12-09 RX ORDER — FLUMAZENIL 0.1 MG/ML
0.2 INJECTION, SOLUTION INTRAVENOUS AS NEEDED
OUTPATIENT
Start: 2021-12-09

## 2021-12-09 RX ORDER — SODIUM CHLORIDE 9 MG/ML
INJECTION, SOLUTION INTRAVENOUS CONTINUOUS
OUTPATIENT
Start: 2021-12-10

## 2021-12-10 ENCOUNTER — HOSPITAL ENCOUNTER (OUTPATIENT)
Dept: CT IMAGING | Facility: HOSPITAL | Age: 61
Discharge: HOME | End: 2021-12-10
Attending: INTERNAL MEDICINE
Payer: COMMERCIAL

## 2021-12-10 DIAGNOSIS — C34.90: Primary | ICD-10-CM

## 2021-12-10 NOTE — IMAGING NOTE
Pt came in  For the bx  And  Stated he feels like the  Flu body ache and  Runny nose and  Does ot feel good.  Dr Kanika Sofia notified and pat asked to be  Rescheduled for the bx

## 2021-12-13 ENCOUNTER — OFFICE VISIT (OUTPATIENT)
Dept: HEMATOLOGY/ONCOLOGY | Age: 61
End: 2021-12-13
Attending: INTERNAL MEDICINE
Payer: COMMERCIAL

## 2021-12-13 VITALS
RESPIRATION RATE: 18 BRPM | BODY MASS INDEX: 30 KG/M2 | SYSTOLIC BLOOD PRESSURE: 85 MMHG | TEMPERATURE: 98 F | WEIGHT: 178 LBS | HEART RATE: 78 BPM | DIASTOLIC BLOOD PRESSURE: 51 MMHG | OXYGEN SATURATION: 96 %

## 2021-12-13 DIAGNOSIS — G89.3 NEOPLASM RELATED PAIN: ICD-10-CM

## 2021-12-13 DIAGNOSIS — F32.9 REACTIVE DEPRESSION: ICD-10-CM

## 2021-12-13 DIAGNOSIS — Z51.11 ENCOUNTER FOR CHEMOTHERAPY MANAGEMENT: Primary | ICD-10-CM

## 2021-12-13 DIAGNOSIS — C79.51 SECONDARY CANCER OF BONE (HCC): ICD-10-CM

## 2021-12-13 DIAGNOSIS — C34.92 PRIMARY MALIGNANT NEOPLASM OF LEFT LUNG METASTATIC TO OTHER SITE (HCC): ICD-10-CM

## 2021-12-13 DIAGNOSIS — C79.51 SECONDARY MALIGNANT NEOPLASM OF BONE (HCC): ICD-10-CM

## 2021-12-13 DIAGNOSIS — R31.0 GROSS HEMATURIA: ICD-10-CM

## 2021-12-13 DIAGNOSIS — F41.9 ANXIETY: ICD-10-CM

## 2021-12-13 PROCEDURE — 99215 OFFICE O/P EST HI 40 MIN: CPT | Performed by: CLINICAL NURSE SPECIALIST

## 2021-12-13 NOTE — PROGRESS NOTES
ANP Visit Note    Patient Name: Norma Hernandez   YOB: 1960   Medical Record Number: [id number]   CSN: [id number]   Date of visit: 12/13/2021   Champ Miller MD   No primary care provider on file.      Chief Complaint/Reason for Visit:  Emeli Gerard Dr. Del Akbar   • Back problem    • COPD (chronic obstructive pulmonary disease) ([facility]     no oxygen   • Depression     Dr. Del Akbar   • Essential hypertension, benign    • Exposure to medical diagnostic radiation     March   • Former cigarette smoker    • H Seat Belt: Not Asked        Special Diet: Not Asked        Stress Concern: Not Asked        Weight Concern: Not Asked         Service: Not Asked        Blood Transfusions: Not Asked        Occupational Exposure: Not Asked        Hobby Hazards: Not tablet, Rfl: 3  •  metoprolol tartrate 100 MG Oral Tab, Take 1 tablet (100 mg total) by mouth 2 (two) times daily. , Disp: 180 tablet, Rfl: 0  •  Senna 8.6 MG Oral Tab, Take 17.2 mg by mouth 2 (two) times daily. , Disp: , Rfl:   •  polyethylene glycol, PEG 3 BMI 29.62 kg/m²   HEENT: EOMs intact. PERRL. Oropharynx is clear. Neck: No JVD. No palpable lymphadenopathy. Neck is supple. Chest: Clear to auscultation. Diminished bilateral, no wheezing   Heart: Regular rate and rhythm.    Abdomen: Soft, non tender w

## 2021-12-14 ENCOUNTER — TELEPHONE (OUTPATIENT)
Dept: HEMATOLOGY/ONCOLOGY | Facility: HOSPITAL | Age: 61
End: 2021-12-14

## 2021-12-29 NOTE — PROGRESS NOTES
[facility]    PATIENT'S NAME: Sonjakit Sung   ATTENDING PHYSICIAN: JETT Benton    PATIENT ACCOUNT #: [de-identified] LOCATION: [address] [place] [address] RECORD #: [id number] YOB: 1960   DATE OF SERVICE: 12/02/2021       C[facility] P nightly, morphine sulfate extended release 30 mg every 8 hours, nitrofurantoin 100 mg twice daily, ondansetron 8 mg q.8 h. p.r.n., MiraLAX 17 g twice daily, potassium chloride 10 mEq twice daily, prochlorperazine 10 mg q.6 h. p.r.n., quetiapine 100 mg nigh for ramucirumab given the bleeding that he is experiencing from the kidneys. He also has evidence of iron deficiency, likely as a result of the bleeding, and will be getting an iron infusion.   I will make arrangements for this to be done in the next few w

## 2021-12-29 NOTE — PROGRESS NOTES
Patient is here for MD follow up and cycle 2 of Cyramza/Taxotere. Patient reports he has not been feeling well. Chronic fatigue and weakness. He is falling a lot at home, no LOC and never went to the ER. He has been able to eat but c/o taste changes.  Dayanara

## 2021-12-30 ENCOUNTER — OFFICE VISIT (OUTPATIENT)
Dept: HEMATOLOGY/ONCOLOGY | Age: 61
End: 2021-12-30
Attending: INTERNAL MEDICINE
Payer: COMMERCIAL

## 2021-12-30 ENCOUNTER — SOCIAL WORK SERVICES (OUTPATIENT)
Dept: HEMATOLOGY/ONCOLOGY | Facility: HOSPITAL | Age: 61
End: 2021-12-30

## 2021-12-30 VITALS
TEMPERATURE: 99 F | DIASTOLIC BLOOD PRESSURE: 69 MMHG | SYSTOLIC BLOOD PRESSURE: 107 MMHG | HEIGHT: 64.96 IN | OXYGEN SATURATION: 98 % | HEART RATE: 72 BPM | WEIGHT: 184.5 LBS | BODY MASS INDEX: 30.74 KG/M2 | RESPIRATION RATE: 18 BRPM

## 2021-12-30 DIAGNOSIS — C79.51 SECONDARY CANCER OF BONE (HCC): ICD-10-CM

## 2021-12-30 DIAGNOSIS — C34.92 PRIMARY MALIGNANT NEOPLASM OF LEFT LUNG METASTATIC TO OTHER SITE (HCC): ICD-10-CM

## 2021-12-30 DIAGNOSIS — R53.1 ASTHENIA: ICD-10-CM

## 2021-12-30 DIAGNOSIS — G89.3 NEOPLASM RELATED PAIN: ICD-10-CM

## 2021-12-30 DIAGNOSIS — C79.51 SECONDARY CANCER OF BONE (HCC): Primary | ICD-10-CM

## 2021-12-30 DIAGNOSIS — C34.92 PRIMARY MALIGNANT NEOPLASM OF LEFT LUNG METASTATIC TO OTHER SITE (HCC): Primary | ICD-10-CM

## 2021-12-30 DIAGNOSIS — C34.90 MALIGNANT NEOPLASM OF LUNG, UNSPECIFIED LATERALITY, UNSPECIFIED PART OF LUNG (HCC): ICD-10-CM

## 2021-12-30 DIAGNOSIS — D63.0 ANEMIA COMPLICATING NEOPLASTIC DISEASE: ICD-10-CM

## 2021-12-30 DIAGNOSIS — D63.0 ANEMIA COMPLICATING NEOPLASTIC DISEASE: Primary | ICD-10-CM

## 2021-12-30 PROCEDURE — 96417 CHEMO IV INFUS EACH ADDL SEQ: CPT

## 2021-12-30 PROCEDURE — 99214 OFFICE O/P EST MOD 30 MIN: CPT | Performed by: INTERNAL MEDICINE

## 2021-12-30 PROCEDURE — 96413 CHEMO IV INFUSION 1 HR: CPT

## 2021-12-30 PROCEDURE — 85025 COMPLETE CBC W/AUTO DIFF WBC: CPT

## 2021-12-30 PROCEDURE — 83550 IRON BINDING TEST: CPT

## 2021-12-30 PROCEDURE — 82728 ASSAY OF FERRITIN: CPT

## 2021-12-30 PROCEDURE — 80053 COMPREHEN METABOLIC PANEL: CPT

## 2021-12-30 PROCEDURE — 96375 TX/PRO/DX INJ NEW DRUG ADDON: CPT

## 2021-12-30 PROCEDURE — 83540 ASSAY OF IRON: CPT

## 2021-12-30 RX ORDER — DIPHENHYDRAMINE HYDROCHLORIDE 50 MG/ML
25 INJECTION INTRAMUSCULAR; INTRAVENOUS ONCE
Status: COMPLETED | OUTPATIENT
Start: 2021-12-30 | End: 2021-12-30

## 2021-12-30 RX ADMIN — DIPHENHYDRAMINE HYDROCHLORIDE 25 MG: 50 INJECTION INTRAMUSCULAR; INTRAVENOUS at 10:33:00

## 2021-12-30 NOTE — PROGRESS NOTES
spoke with Patient on the phone; stated that he spoke with [employer] and is over the income for assistance since he sold his [address]   educated on [address]; patient open to all options and requested resourc

## 2021-12-30 NOTE — PROGRESS NOTES
Pt here for C2D1 Cyramza/taxotere.   Arrives Via wheelchair, accompanied by Self           Pregnancy screening: Not applicable    Modifications in dose or schedule: No     Frequency of blood return and site check throughout administration: Prior to administ

## 2022-01-03 PROBLEM — R53.1 ASTHENIA: Status: ACTIVE | Noted: 2022-01-03

## 2022-01-04 NOTE — PROGRESS NOTES
[address]    PATIENT'S NAME: Eric Kahn   ATTENDING PHYSICIAN: JETT Ta    PATIENT ACCOUNT #: [de-identified] LOCATION: [address] [place] [address] RECORD #: [id number] YOB: 1960   DATE OF SERVICE: 12/30/2021       CANCER CENTER P twice daily; prochlorperazine 10 mg q.6 h. p.r.n.; quetiapine 100 mg nightly; senna 8.6 mg twice daily; tamsulosin 0.4 mg twice daily; and Anoro Ellipta one puff daily. He feels as though he tolerated the last cycle of chemotherapy adequately.   His weight anticoagulation. Dictated By Adela Gibson M.D.  d: 01/03/2022 08:38:52  t: 01/03/2022 18:36:15  Job [id number]  OC/    cc: ZACK Lomeli M.D. Malcolm Slaughter, M.D. Carl Skinner, [facility]

## 2022-01-10 ENCOUNTER — APPOINTMENT (OUTPATIENT)
Dept: CT IMAGING | Facility: HOSPITAL | Age: 62
DRG: 871 | End: 2022-01-10
Attending: EMERGENCY MEDICINE
Payer: COMMERCIAL

## 2022-01-10 ENCOUNTER — TELEPHONE (OUTPATIENT)
Dept: HEMATOLOGY/ONCOLOGY | Facility: HOSPITAL | Age: 62
End: 2022-01-10

## 2022-01-10 ENCOUNTER — APPOINTMENT (OUTPATIENT)
Dept: GENERAL RADIOLOGY | Facility: HOSPITAL | Age: 62
DRG: 871 | End: 2022-01-10
Attending: EMERGENCY MEDICINE
Payer: COMMERCIAL

## 2022-01-10 ENCOUNTER — TELEPHONE (OUTPATIENT)
Dept: HEMATOLOGY/ONCOLOGY | Age: 62
End: 2022-01-10

## 2022-01-10 ENCOUNTER — HOSPITAL ENCOUNTER (INPATIENT)
Facility: HOSPITAL | Age: 62
LOS: 4 days | Discharge: HOSPICE | DRG: 871 | End: 2022-01-14
Attending: EMERGENCY MEDICINE | Admitting: INTERNAL MEDICINE
Payer: COMMERCIAL

## 2022-01-10 DIAGNOSIS — C79.31 SECONDARY CANCER OF BRAIN (HCC): ICD-10-CM

## 2022-01-10 DIAGNOSIS — N39.0 SEPSIS SECONDARY TO UTI (HCC): Primary | ICD-10-CM

## 2022-01-10 DIAGNOSIS — A41.9 SEPSIS SECONDARY TO UTI (HCC): Primary | ICD-10-CM

## 2022-01-10 DIAGNOSIS — Z51.5 COMFORT MEASURES ONLY STATUS: ICD-10-CM

## 2022-01-10 DIAGNOSIS — J44.9 CHRONIC OBSTRUCTIVE PULMONARY DISEASE, UNSPECIFIED COPD TYPE (HCC): Chronic | ICD-10-CM

## 2022-01-10 DIAGNOSIS — A41.9 SEPTIC SHOCK (HCC): ICD-10-CM

## 2022-01-10 DIAGNOSIS — D64.9 ANEMIA, UNSPECIFIED TYPE: ICD-10-CM

## 2022-01-10 DIAGNOSIS — N17.9 AKI (ACUTE KIDNEY INJURY) (HCC): ICD-10-CM

## 2022-01-10 DIAGNOSIS — G89.3 NEOPLASM RELATED PAIN: Chronic | ICD-10-CM

## 2022-01-10 DIAGNOSIS — C79.51 SECONDARY CANCER OF BONE (HCC): Chronic | ICD-10-CM

## 2022-01-10 DIAGNOSIS — R65.21 SEPTIC SHOCK (HCC): ICD-10-CM

## 2022-01-10 DIAGNOSIS — J18.9 PNEUMONIA DUE TO INFECTIOUS ORGANISM, UNSPECIFIED LATERALITY, UNSPECIFIED PART OF LUNG: ICD-10-CM

## 2022-01-10 LAB
ADENOVIRUS PCR:: NOT DETECTED
ALBUMIN SERPL-MCNC: 2.8 G/DL (ref 3.4–5)
ALBUMIN/GLOB SERPL: 0.8 {RATIO} (ref 1–2)
ALP LIVER SERPL-CCNC: 70 U/L
ALT SERPL-CCNC: 23 U/L
ANION GAP SERPL CALC-SCNC: 8 MMOL/L (ref 0–18)
ANTIBODY SCREEN: NEGATIVE
AST SERPL-CCNC: 22 U/L (ref 15–37)
B PARAPERT DNA SPEC QL NAA+PROBE: NOT DETECTED
B PERT DNA SPEC QL NAA+PROBE: NOT DETECTED
BASOPHILS # BLD AUTO: 0.01 X10(3) UL (ref 0–0.2)
BASOPHILS NFR BLD AUTO: 0.3 %
BILIRUB SERPL-MCNC: 0.5 MG/DL (ref 0.1–2)
BILIRUB UR QL STRIP.AUTO: NEGATIVE
BUN BLD-MCNC: 33 MG/DL (ref 7–18)
C PNEUM DNA SPEC QL NAA+PROBE: NOT DETECTED
CALCIUM BLD-MCNC: 9 MG/DL (ref 8.5–10.1)
CHLORIDE SERPL-SCNC: 103 MMOL/L (ref 98–112)
CO2 SERPL-SCNC: 27 MMOL/L (ref 21–32)
COLOR UR AUTO: YELLOW
CORONAVIRUS 229E PCR:: NOT DETECTED
CORONAVIRUS HKU1 PCR:: NOT DETECTED
CORONAVIRUS NL63 PCR:: NOT DETECTED
CORONAVIRUS OC43 PCR:: NOT DETECTED
CREAT BLD-MCNC: 1.83 MG/DL
EOSINOPHIL # BLD AUTO: 0 X10(3) UL (ref 0–0.7)
EOSINOPHIL NFR BLD AUTO: 0 %
ERYTHROCYTE [DISTWIDTH] IN BLOOD BY AUTOMATED COUNT: 21.7 %
FLUAV RNA SPEC QL NAA+PROBE: NOT DETECTED
FLUBV RNA SPEC QL NAA+PROBE: NOT DETECTED
GLOBULIN PLAS-MCNC: 3.7 G/DL (ref 2.8–4.4)
GLUCOSE BLD-MCNC: 103 MG/DL (ref 70–99)
GLUCOSE UR STRIP.AUTO-MCNC: NEGATIVE MG/DL
HCT VFR BLD AUTO: 23.4 %
HGB BLD-MCNC: 7.3 G/DL
IMM GRANULOCYTES # BLD AUTO: 0.08 X10(3) UL (ref 0–1)
IMM GRANULOCYTES NFR BLD: 2.3 %
INR BLD: 1.03 (ref 0.8–1.2)
KETONES UR STRIP.AUTO-MCNC: NEGATIVE MG/DL
LACTATE SERPL-SCNC: 1.1 MMOL/L (ref 0.4–2)
LYMPHOCYTES # BLD AUTO: 1.16 X10(3) UL (ref 1–4)
LYMPHOCYTES NFR BLD AUTO: 33.5 %
MAGNESIUM SERPL-MCNC: 3.5 MG/DL (ref 1.6–2.6)
MCH RBC QN AUTO: 28 PG (ref 26–34)
MCHC RBC AUTO-ENTMCNC: 31.2 G/DL (ref 31–37)
MCV RBC AUTO: 89.7 FL
METAPNEUMOVIRUS PCR:: NOT DETECTED
MONOCYTES # BLD AUTO: 0.6 X10(3) UL (ref 0.1–1)
MONOCYTES NFR BLD AUTO: 17.3 %
MYCOPLASMA PNEUMONIA PCR:: NOT DETECTED
NEUTROPHILS # BLD AUTO: 1.61 X10 (3) UL (ref 1.5–7.7)
NEUTROPHILS # BLD AUTO: 1.61 X10(3) UL (ref 1.5–7.7)
NEUTROPHILS NFR BLD AUTO: 46.6 %
NITRITE UR QL STRIP.AUTO: NEGATIVE
OSMOLALITY SERPL CALC.SUM OF ELEC: 294 MOSM/KG (ref 275–295)
PARAINFLUENZA 1 PCR:: NOT DETECTED
PARAINFLUENZA 2 PCR:: NOT DETECTED
PARAINFLUENZA 3 PCR:: NOT DETECTED
PARAINFLUENZA 4 PCR:: NOT DETECTED
PH UR STRIP.AUTO: 5 [PH] (ref 5–8)
PLATELET # BLD AUTO: 181 10(3)UL (ref 150–450)
POTASSIUM SERPL-SCNC: 3.5 MMOL/L (ref 3.5–5.1)
PROCALCITONIN SERPL-MCNC: 0.23 NG/ML (ref ?–0.16)
PROT SERPL-MCNC: 6.5 G/DL (ref 6.4–8.2)
PROT UR STRIP.AUTO-MCNC: 100 MG/DL
PROTHROMBIN TIME: 13.5 SECONDS (ref 11.6–14.8)
RBC # BLD AUTO: 2.61 X10(6)UL
RBC #/AREA URNS AUTO: >10 /HPF
RH BLOOD TYPE: NEGATIVE
RHINOVIRUS/ENTERO PCR:: NOT DETECTED
RSV RNA SPEC QL NAA+PROBE: NOT DETECTED
SARS-COV-2 RNA NPH QL NAA+NON-PROBE: NOT DETECTED
SARS-COV-2 RNA RESP QL NAA+PROBE: NOT DETECTED
SODIUM SERPL-SCNC: 138 MMOL/L (ref 136–145)
SP GR UR STRIP.AUTO: 1.02 (ref 1–1.03)
TROPONIN I HIGH SENSITIVITY: 8 NG/L
UROBILINOGEN UR STRIP.AUTO-MCNC: <2 MG/DL
WBC # BLD AUTO: 3.5 X10(3) UL (ref 4–11)
WBC #/AREA URNS AUTO: >50 /HPF
WBC CLUMPS UR QL AUTO: PRESENT /HPF
YEAST UR QL: PRESENT /HPF

## 2022-01-10 PROCEDURE — 30233N1 TRANSFUSION OF NONAUTOLOGOUS RED BLOOD CELLS INTO PERIPHERAL VEIN, PERCUTANEOUS APPROACH: ICD-10-PCS | Performed by: INTERNAL MEDICINE

## 2022-01-10 PROCEDURE — 3E033XZ INTRODUCTION OF VASOPRESSOR INTO PERIPHERAL VEIN, PERCUTANEOUS APPROACH: ICD-10-PCS | Performed by: EMERGENCY MEDICINE

## 2022-01-10 PROCEDURE — 70450 CT HEAD/BRAIN W/O DYE: CPT | Performed by: EMERGENCY MEDICINE

## 2022-01-10 PROCEDURE — 72125 CT NECK SPINE W/O DYE: CPT | Performed by: EMERGENCY MEDICINE

## 2022-01-10 PROCEDURE — 99291 CRITICAL CARE FIRST HOUR: CPT | Performed by: INTERNAL MEDICINE

## 2022-01-10 PROCEDURE — 71045 X-RAY EXAM CHEST 1 VIEW: CPT | Performed by: EMERGENCY MEDICINE

## 2022-01-10 RX ORDER — SODIUM CHLORIDE 9 MG/ML
INJECTION, SOLUTION INTRAVENOUS CONTINUOUS
Status: DISCONTINUED | OUTPATIENT
Start: 2022-01-10 | End: 2022-01-13

## 2022-01-10 RX ORDER — HYDROCODONE BITARTRATE AND ACETAMINOPHEN 10; 325 MG/1; MG/1
1-2 TABLET ORAL EVERY 6 HOURS PRN
Status: DISCONTINUED | OUTPATIENT
Start: 2022-01-10 | End: 2022-01-14

## 2022-01-10 RX ORDER — MORPHINE SULFATE 4 MG/ML
4 INJECTION, SOLUTION INTRAMUSCULAR; INTRAVENOUS EVERY 2 HOUR PRN
Status: DISCONTINUED | OUTPATIENT
Start: 2022-01-10 | End: 2022-01-14

## 2022-01-10 RX ORDER — ONDANSETRON 2 MG/ML
4 INJECTION INTRAMUSCULAR; INTRAVENOUS EVERY 6 HOURS PRN
Status: DISCONTINUED | OUTPATIENT
Start: 2022-01-10 | End: 2022-01-14

## 2022-01-10 RX ORDER — FINASTERIDE 5 MG/1
5 TABLET, FILM COATED ORAL DAILY
Refills: 3 | Status: DISCONTINUED | OUTPATIENT
Start: 2022-01-10 | End: 2022-01-14

## 2022-01-10 RX ORDER — SENNOSIDES 8.6 MG
17.2 TABLET ORAL 2 TIMES DAILY
Status: DISCONTINUED | OUTPATIENT
Start: 2022-01-10 | End: 2022-01-14

## 2022-01-10 RX ORDER — GABAPENTIN 300 MG/1
600 CAPSULE ORAL 3 TIMES DAILY
Status: DISCONTINUED | OUTPATIENT
Start: 2022-01-10 | End: 2022-01-14

## 2022-01-10 RX ORDER — QUETIAPINE 25 MG/1
100 TABLET, FILM COATED ORAL NIGHTLY
Status: DISCONTINUED | OUTPATIENT
Start: 2022-01-10 | End: 2022-01-14

## 2022-01-10 RX ORDER — MORPHINE SULFATE 2 MG/ML
1 INJECTION, SOLUTION INTRAMUSCULAR; INTRAVENOUS EVERY 2 HOUR PRN
Status: DISCONTINUED | OUTPATIENT
Start: 2022-01-10 | End: 2022-01-14

## 2022-01-10 RX ORDER — ALBUTEROL SULFATE 90 UG/1
2 AEROSOL, METERED RESPIRATORY (INHALATION) EVERY 6 HOURS PRN
Status: DISCONTINUED | OUTPATIENT
Start: 2022-01-10 | End: 2022-01-14

## 2022-01-10 RX ORDER — PROCHLORPERAZINE EDISYLATE 5 MG/ML
5 INJECTION INTRAMUSCULAR; INTRAVENOUS EVERY 8 HOURS PRN
Status: DISCONTINUED | OUTPATIENT
Start: 2022-01-10 | End: 2022-01-14

## 2022-01-10 RX ORDER — MIRTAZAPINE 15 MG/1
45 TABLET, FILM COATED ORAL NIGHTLY
Status: DISCONTINUED | OUTPATIENT
Start: 2022-01-10 | End: 2022-01-14

## 2022-01-10 RX ORDER — MORPHINE SULFATE 2 MG/ML
2 INJECTION, SOLUTION INTRAMUSCULAR; INTRAVENOUS EVERY 2 HOUR PRN
Status: DISCONTINUED | OUTPATIENT
Start: 2022-01-10 | End: 2022-01-14

## 2022-01-10 NOTE — TELEPHONE ENCOUNTER
Spoke to his mother, Randy Garza. He is extremely weak and has fallen twice - necessitating help to get him up as she cannot do it. Unsure if this is all due to cancer progression or toxicity from last cycle of chemo.   I recommended bringing him to ER - will

## 2022-01-10 NOTE — ED INITIAL ASSESSMENT (HPI)
Pt reports increasing generalized fatigue causing several falls recently. Denies injury. Last chemo approx 1 week ago for lung CA.

## 2022-01-10 NOTE — TELEPHONE ENCOUNTER
Gini Cordon is weak, has fallen 2 times in last week, she can not get him to the care to bring him in for apt, she has not even scheduled scan as she does not know that she could get him in.  Denies fevers, sob unchanged, denies nausea or vomitin

## 2022-01-10 NOTE — TELEPHONE ENCOUNTER
Patient has been getting weaker every week and his mother does not think she can get him to his 1/11 appointment

## 2022-01-10 NOTE — ED PROVIDER NOTES
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       Coordination: Coordination normal.   Psychiatric:         Mood and Affect: Mood normal.         Behavior: Behavior normal.              ED Course     Labs Reviewed   COMP METABOLIC PANEL (14) - Abnormal; Notable for the following swab from individuals suspected of respiratory viral infection consistent with COVID-19 by their healthcare provider.     Test performed using the [employer] Respiratory Panel 2.1 (RP2.1) assay on the Aligo 2.0 System, Via DIANA Ortega reviewed by myself. -Imaging studies were ordered and reviewed by myself.   -If available, previous medical record was reviewed for the patient to obtain additional history.  -Nursing and tech notes reviewed and data confirmed.    -Tracing on cardiac monit ACID, PLASMA - Normal   RAPID SARS-COV-2 BY PCR - Normal   RESPIRATORY FLU EXPAND PANEL + COVID-19 - Normal    Narrative:      This test is intended for the simultaneous qualitative detection and differentiation of nucleic acids from multiple viral and bact (BLOOD TYPE)   ANTIBODY SCREEN   BLOOD CULTURE   BLOOD CULTURE   URINE CULTURE, ROUTINE     CT BRAIN OR HEAD (09859)   Final Result    PROCEDURE:  CT BRAIN OR HEAD (58310)         COMPARISON:  MR JIE, MRI BRAIN (W+WO) (CPT=70553), 3/05/2021, 12:58 (CPT=71045)   Final Result    PROCEDURE:  XR CHEST AP PORTABLE  (CPT=71045)         TECHNIQUE:  AP chest radiograph was obtained.          COMPARISON:  [facility] , XR, XR CHEST AP PORTABLE  (CPT=71045), 6/02/2021,     10:19 PM.         INDICATIONS:  weakness pressor support. I did discuss with the family and they confirm he is DNR and comfort only and would like pressors discontinued and made him comfort care only. There was a punctate 3 to 4 mm high density focus on the CT calcification versus hemorrhage.

## 2022-01-11 ENCOUNTER — APPOINTMENT (OUTPATIENT)
Dept: CT IMAGING | Facility: HOSPITAL | Age: 62
DRG: 871 | End: 2022-01-11
Attending: INTERNAL MEDICINE
Payer: COMMERCIAL

## 2022-01-11 ENCOUNTER — APPOINTMENT (OUTPATIENT)
Dept: HEMATOLOGY/ONCOLOGY | Age: 62
End: 2022-01-11
Attending: INTERNAL MEDICINE
Payer: COMMERCIAL

## 2022-01-11 LAB
ANION GAP SERPL CALC-SCNC: 9 MMOL/L (ref 0–18)
ATRIAL RATE: 82 BPM
BASOPHILS # BLD AUTO: 0.01 X10(3) UL (ref 0–0.2)
BASOPHILS NFR BLD AUTO: 0.3 %
BUN BLD-MCNC: 25 MG/DL (ref 7–18)
CALCIUM BLD-MCNC: 8.1 MG/DL (ref 8.5–10.1)
CHLORIDE SERPL-SCNC: 110 MMOL/L (ref 98–112)
CO2 SERPL-SCNC: 24 MMOL/L (ref 21–32)
CREAT BLD-MCNC: 1.23 MG/DL
EOSINOPHIL # BLD AUTO: 0 X10(3) UL (ref 0–0.7)
EOSINOPHIL NFR BLD AUTO: 0 %
ERYTHROCYTE [DISTWIDTH] IN BLOOD BY AUTOMATED COUNT: 21.1 %
GLUCOSE BLD-MCNC: 75 MG/DL (ref 70–99)
GLUCOSE BLD-MCNC: 78 MG/DL (ref 70–99)
HCT VFR BLD AUTO: 24.6 %
HGB BLD-MCNC: 7.4 G/DL
IMM GRANULOCYTES # BLD AUTO: 0.05 X10(3) UL (ref 0–1)
IMM GRANULOCYTES NFR BLD: 1.6 %
IRON SATN MFR SERPL: 22 %
IRON SERPL-MCNC: 49 UG/DL
LYMPHOCYTES # BLD AUTO: 1.1 X10(3) UL (ref 1–4)
LYMPHOCYTES NFR BLD AUTO: 35 %
MCH RBC QN AUTO: 27.8 PG (ref 26–34)
MCHC RBC AUTO-ENTMCNC: 30.1 G/DL (ref 31–37)
MCV RBC AUTO: 92.5 FL
MONOCYTES # BLD AUTO: 0.54 X10(3) UL (ref 0.1–1)
MONOCYTES NFR BLD AUTO: 17.2 %
NEUTROPHILS # BLD AUTO: 1.44 X10 (3) UL (ref 1.5–7.7)
NEUTROPHILS # BLD AUTO: 1.44 X10(3) UL (ref 1.5–7.7)
NEUTROPHILS NFR BLD AUTO: 45.9 %
OSMOLALITY SERPL CALC.SUM OF ELEC: 299 MOSM/KG (ref 275–295)
P AXIS: 42 DEGREES
P-R INTERVAL: 152 MS
PLATELET # BLD AUTO: 143 10(3)UL (ref 150–450)
POTASSIUM SERPL-SCNC: 3.7 MMOL/L (ref 3.5–5.1)
Q-T INTERVAL: 392 MS
QRS DURATION: 90 MS
QTC CALCULATION (BEZET): 457 MS
R AXIS: 16 DEGREES
RBC # BLD AUTO: 2.66 X10(6)UL
SODIUM SERPL-SCNC: 143 MMOL/L (ref 136–145)
T AXIS: 50 DEGREES
TIBC SERPL-MCNC: 219 UG/DL (ref 240–450)
TRANSFERRIN SERPL-MCNC: 147 MG/DL (ref 200–360)
VENTRICULAR RATE: 82 BPM
WBC # BLD AUTO: 3.1 X10(3) UL (ref 4–11)

## 2022-01-11 PROCEDURE — 99255 IP/OBS CONSLTJ NEW/EST HI 80: CPT | Performed by: INTERNAL MEDICINE

## 2022-01-11 PROCEDURE — 99232 SBSQ HOSP IP/OBS MODERATE 35: CPT | Performed by: INTERNAL MEDICINE

## 2022-01-11 PROCEDURE — 70450 CT HEAD/BRAIN W/O DYE: CPT | Performed by: INTERNAL MEDICINE

## 2022-01-11 PROCEDURE — 71250 CT THORAX DX C-: CPT | Performed by: INTERNAL MEDICINE

## 2022-01-11 PROCEDURE — 74176 CT ABD & PELVIS W/O CONTRAST: CPT | Performed by: INTERNAL MEDICINE

## 2022-01-11 NOTE — BH PROGRESS NOTE
Went to unit to see the patient and he was lethargic. When asked the phq9 questions was not answering them. He was able to state he is at [facility]. Will defer the phq9 due to patients ams.

## 2022-01-11 NOTE — PALLIATIVE CARE NOTE
Palliative Care  Palliative Care Consult request from Dr. Marcus Alicea on 1/10/22 noted requesting discussion for goals of care.  Chart reviewed noting Dr. James Rodriguez order for CT C/A/P to confirm suspicion of spread of metastatic disease and need for facility duyen

## 2022-01-11 NOTE — CONSULTS
[facility]  Report of Consultation    Kevin Rosa Patient Status:  Inpatient    10/22/1960 MRN [id number]   [facility] 2NE-A Attending Karie Rosa MD   Hosp Day # 1 PCP Andra Whitlock MD     Reason for Consultation:    Progr cough.    History:  Past Medical History:   Diagnosis Date   • Anxiety     Dr. Maude Braga   • Back problem    • COPD [facility]chronic [facility]     no oxygen   • Depression     Dr. Maude Braga   • Essential hypertension, benign    • Exposure to m QUEtiapine (SEROQUEL) tab 100 mg, 100 mg, Oral, Nightly  •  sennosides (SENOKOT) tab 17.2 mg, 17.2 mg, Oral, BID  •  umeclidinium-vilanterol (ANORO ELLIPTA) 62.5-25 MCG/INH inhaler 1 puff, 1 puff, Inhalation, Daily  •  0.9% NaCl infusion, , Intravenous, Co Chloride 103 98 - 112 mmol/L    CO2 27.0 21.0 - 32.0 mmol/L    Anion Gap 8 0 - 18 mmol/L    BUN 33 (H) 7 - 18 mg/dL    Creatinine 1.83 (H) 0.70 - 1.30 mg/dL    Calcium, Total 9.0 8.5 - 10.1 mg/dL    Calculated Osmolality 294 275 - 295 mOsm/kg    GFR, Non-A Collection Time: 01/10/22  5:03 PM    Specimen: Nares;  Other   Result Value Ref Range    Rapid SARS-CoV-2 by PCR Not Detected Not Detected   Lactic Acid, Plasma    Collection Time: 01/10/22  5:03 PM   Result Value Ref Range    Lactic Acid 1.1 0.4 - 2.0 mmo Detected    Adenovirus PCR: Not Detected Not Detected    Coronavirus 229E PCR: Not Detected Not Detected    Coronavirus Hku1 PCR: Not Detected Not Detected    Coronavirus Nl63 PCR: Not Detected Not Detected    Coronavirus Oc43 PCR: Not Detected Not Detecte MCV 92.5 80.0 - 100.0 fL    MCH 27.8 26.0 - 34.0 pg    MCHC 30.1 (L) 31.0 - 37.0 g/dL    RDW 21.1 %    Neutrophil Absolute Prelim 1.44 (L) 1.50 - 7.70 x10 (3) uL    Neutrophil Absolute 1.44 (L) 1.50 - 7.70 x10(3) uL    Lymphocyte Absolute 1.10 1.00 - 4.00 Impression   CONCLUSION:     1. Punctate 3-4 mm high density focus within the left parietal cortex may represent calcification versus hemorrhage, correlate clinically.       Findings discussed with ED MD, Dr. Alf Rodriguez at the time of this dictation.         involves the left lower trachea, direct visualization recommended as clinically indicated.                            Impression   CONCLUSION:     1.  Diffuse degenerative change.  Details as above.           Dictated by (CST): Gail Beauchamp MD on 1/10/2022 at unable to take care of him at home. Will need social workers/ to assist in placement. If he has progressive disease on imaging he will not be a candidate for next line treatment due to poor PS.     6)  Anemia - severe - transfuse one unit if

## 2022-01-11 NOTE — H&P
[facility] HOSPITALIST  History and Physical     Jonoyanely Dawson Patient Status:  Inpatient    10/22/1960 MRN [id number]   [facility] 2NE-A Attending Fabi Pereira, DO   Hosp Day # 0 PCP Mik Avalos MD     Chief Complaint: Weakness    His SURGERY  1990    Hernia repair    • TONSILLECTOMY      as a child       Social History:  reports that he has been smoking cigarettes. He started smoking about 41 years ago. He has a 45.00 pack-year smoking history.  He has never used smokeless tobacco. He r (two) times daily. , Disp: , Rfl:   polyethylene glycol, PEG 3350, 17 g Oral Powd Pack, Take 17 g by mouth 2 (two) times a day., Disp: , Rfl:   Dutasteride 0.5 MG Oral Cap, Take 1 capsule (0.5 mg total) by mouth daily. , Disp: 90 capsule, Rfl: 3  bumetanide Labs:  Recent Labs   Lab 01/10/22  1703   WBC 3.5*   HGB 7.3*   MCV 89.7   .0   INR 1.03       Recent Labs   Lab 01/10/22  1703   *   BUN 33*   CREATSERUM 1.83*   GFRAA 45*   GFRNAA 39*   CA 9.0   ALB 2.8*      K 3.5      CO2 She would like to discuss Palliative options.  She is okay with antibiotics and IVF.       Quality:  · DVT Prophylaxis: SCDs  · CODE status: DNAR/Comfort  · Tirado: yes  · If COVID testing is negative, may discontinue isolation: yes     Plan of care discusse

## 2022-01-11 NOTE — ED QUICK NOTES
Orders for admission, patient is aware of plan and ready to go upstairs. Any questions, please call ED RN [phone] .      Patient Covid vaccination status: Fully vaccinated     COVID Test Ordered in ED: Respiratory Flu Expanded Panel + COVID-19    COVID Suspic

## 2022-01-11 NOTE — PLAN OF CARE
Admitted to the unit [address]  Pt A&Ox 4; lethargic. On 2L NC; SpO2 remaninig greater than 92% with no complaints of SOB. NSR on tele; no c/o cardiac symptoms.  Pt icontinent of bowel; pham in place,  temperature  - Assess for signs of decreased coronary artery perfusion - ex.  Angina  - Evaluate fluid balance, assess for edema, trend weights  Outcome: Progressing  Goal: Absence of cardiac arrhythmias or at baseline  Description: INTERVENTIONS:  - Contin of patient/family/discharge partner  - Complete POLST form as appropriate  - Assess patient's ability to be responsible for managing their own health  - Refer to Case Management Department for coordinating discharge planning if the patient needs post-hospi

## 2022-01-11 NOTE — PROGRESS NOTES
[facility]     Hospitalist Progress Note     Palma Jenkins Patient Status:  Inpatient    10/22/1960 MRN [id number]   [facility] 2NE-A Attending Corinne Early, MD   Hosp Day # 1 PCP Roxana Phelps MD     Chief Complaint: gen [employer] last 168 hours. Inflammatory Markers  No results for input(s): CRP, SHYAM, LDH, DDIMER in the last 168 hours. Recent Labs   Lab 01/10/22  1703   TROPHS 8       Imaging: Imaging data reviewed in Epic.     Medications:   • cefTRIAXone  1 g Intravenous Q24

## 2022-01-11 NOTE — SEPSIS REASSESSMENT
[facility]    Sepsis Reassessment Note    BP (!) 81/33   Pulse 85   Temp 97.4 °F (36.3 °C) (Oral)   Resp 16   Ht 167.6 cm (5' 6\")   Wt 79.8 kg   SpO2 95%   BMI 28.41 kg/m²      I completed the sepsis reassessment at 1800    Cardiac:  Regularity: Re

## 2022-01-12 ENCOUNTER — APPOINTMENT (OUTPATIENT)
Dept: MRI IMAGING | Facility: HOSPITAL | Age: 62
DRG: 871 | End: 2022-01-12
Attending: INTERNAL MEDICINE
Payer: COMMERCIAL

## 2022-01-12 PROBLEM — G89.3 NEOPLASM RELATED PAIN: Status: ACTIVE | Noted: 2022-01-12

## 2022-01-12 PROBLEM — Z71.89 GOALS OF CARE, COUNSELING/DISCUSSION: Status: ACTIVE | Noted: 2022-01-12

## 2022-01-12 PROBLEM — Z51.5 PALLIATIVE CARE ENCOUNTER: Status: ACTIVE | Noted: 2022-01-12

## 2022-01-12 LAB
ADENOVIRUS PCR:: NOT DETECTED
ANION GAP SERPL CALC-SCNC: 8 MMOL/L (ref 0–18)
ATRIAL RATE: 111 BPM
B PARAPERT DNA SPEC QL NAA+PROBE: NOT DETECTED
B PERT DNA SPEC QL NAA+PROBE: NOT DETECTED
BASOPHILS # BLD AUTO: 0.01 X10(3) UL (ref 0–0.2)
BASOPHILS NFR BLD AUTO: 0.2 %
BLOOD TYPE BARCODE: 600
BUN BLD-MCNC: 13 MG/DL (ref 7–18)
C PNEUM DNA SPEC QL NAA+PROBE: NOT DETECTED
CALCIUM BLD-MCNC: 7.8 MG/DL (ref 8.5–10.1)
CHLORIDE SERPL-SCNC: 113 MMOL/L (ref 98–112)
CO2 SERPL-SCNC: 22 MMOL/L (ref 21–32)
CORONAVIRUS 229E PCR:: NOT DETECTED
CORONAVIRUS HKU1 PCR:: NOT DETECTED
CORONAVIRUS NL63 PCR:: NOT DETECTED
CORONAVIRUS OC43 PCR:: NOT DETECTED
CREAT BLD-MCNC: 0.98 MG/DL
EOSINOPHIL # BLD AUTO: 0.01 X10(3) UL (ref 0–0.7)
EOSINOPHIL NFR BLD AUTO: 0.2 %
ERYTHROCYTE [DISTWIDTH] IN BLOOD BY AUTOMATED COUNT: 22.5 %
FLUAV RNA SPEC QL NAA+PROBE: NOT DETECTED
FLUBV RNA SPEC QL NAA+PROBE: NOT DETECTED
GLUCOSE BLD-MCNC: 99 MG/DL (ref 70–99)
HCT VFR BLD AUTO: 27.7 %
HGB BLD-MCNC: 8.5 G/DL
IMM GRANULOCYTES # BLD AUTO: 0.05 X10(3) UL (ref 0–1)
IMM GRANULOCYTES NFR BLD: 1 %
LYMPHOCYTES # BLD AUTO: 1.12 X10(3) UL (ref 1–4)
LYMPHOCYTES NFR BLD AUTO: 23 %
MCH RBC QN AUTO: 28.4 PG (ref 26–34)
MCHC RBC AUTO-ENTMCNC: 30.7 G/DL (ref 31–37)
MCV RBC AUTO: 92.6 FL
METAPNEUMOVIRUS PCR:: NOT DETECTED
MONOCYTES # BLD AUTO: 0.64 X10(3) UL (ref 0.1–1)
MONOCYTES NFR BLD AUTO: 13.2 %
MYCOPLASMA PNEUMONIA PCR:: NOT DETECTED
NEUTROPHILS # BLD AUTO: 3.03 X10 (3) UL (ref 1.5–7.7)
NEUTROPHILS # BLD AUTO: 3.03 X10(3) UL (ref 1.5–7.7)
NEUTROPHILS NFR BLD AUTO: 62.4 %
OSMOLALITY SERPL CALC.SUM OF ELEC: 296 MOSM/KG (ref 275–295)
PARAINFLUENZA 1 PCR:: NOT DETECTED
PARAINFLUENZA 2 PCR:: NOT DETECTED
PARAINFLUENZA 3 PCR:: NOT DETECTED
PARAINFLUENZA 4 PCR:: NOT DETECTED
PLATELET # BLD AUTO: 129 10(3)UL (ref 150–450)
PLATELET MORPHOLOGY: NORMAL
POTASSIUM SERPL-SCNC: 3.8 MMOL/L (ref 3.5–5.1)
Q-T INTERVAL: 322 MS
QRS DURATION: 80 MS
QTC CALCULATION (BEZET): 479 MS
R AXIS: 11 DEGREES
RBC # BLD AUTO: 2.99 X10(6)UL
RHINOVIRUS/ENTERO PCR:: NOT DETECTED
RSV RNA SPEC QL NAA+PROBE: NOT DETECTED
SARS-COV-2 RNA NPH QL NAA+NON-PROBE: NOT DETECTED
SODIUM SERPL-SCNC: 143 MMOL/L (ref 136–145)
T AXIS: 133 DEGREES
VENTRICULAR RATE: 133 BPM
WBC # BLD AUTO: 4.9 X10(3) UL (ref 4–11)

## 2022-01-12 PROCEDURE — 70553 MRI BRAIN STEM W/O & W/DYE: CPT | Performed by: INTERNAL MEDICINE

## 2022-01-12 PROCEDURE — 99253 IP/OBS CNSLTJ NEW/EST LOW 45: CPT | Performed by: CLINICAL NURSE SPECIALIST

## 2022-01-12 PROCEDURE — 99232 SBSQ HOSP IP/OBS MODERATE 35: CPT | Performed by: INTERNAL MEDICINE

## 2022-01-12 RX ORDER — METOPROLOL TARTRATE 100 MG/1
100 TABLET ORAL
Status: DISCONTINUED | OUTPATIENT
Start: 2022-01-12 | End: 2022-01-14

## 2022-01-12 RX ORDER — DILTIAZEM HYDROCHLORIDE 5 MG/ML
10 INJECTION INTRAVENOUS ONCE
Status: COMPLETED | OUTPATIENT
Start: 2022-01-12 | End: 2022-01-12

## 2022-01-12 RX ORDER — PREDNISONE 20 MG/1
40 TABLET ORAL DAILY
Status: DISCONTINUED | OUTPATIENT
Start: 2022-01-12 | End: 2022-01-12

## 2022-01-12 RX ORDER — ALBUTEROL SULFATE 2.5 MG/3ML
2.5 SOLUTION RESPIRATORY (INHALATION)
Status: DISCONTINUED | OUTPATIENT
Start: 2022-01-12 | End: 2022-01-14

## 2022-01-12 RX ORDER — GUAIFENESIN 600 MG
600 TABLET, EXTENDED RELEASE 12 HR ORAL 2 TIMES DAILY
Status: DISCONTINUED | OUTPATIENT
Start: 2022-01-12 | End: 2022-01-14

## 2022-01-12 RX ORDER — ENOXAPARIN SODIUM 100 MG/ML
40 INJECTION SUBCUTANEOUS DAILY
Status: DISCONTINUED | OUTPATIENT
Start: 2022-01-12 | End: 2022-01-12

## 2022-01-12 RX ORDER — POTASSIUM CHLORIDE 20 MEQ/1
40 TABLET, EXTENDED RELEASE ORAL ONCE
Status: COMPLETED | OUTPATIENT
Start: 2022-01-12 | End: 2022-01-12

## 2022-01-12 RX ORDER — MORPHINE SULFATE 15 MG/1
30 TABLET, FILM COATED, EXTENDED RELEASE ORAL EVERY 12 HOURS SCHEDULED
Status: DISCONTINUED | OUTPATIENT
Start: 2022-01-12 | End: 2022-01-14

## 2022-01-12 RX ORDER — ALBUTEROL SULFATE 2.5 MG/3ML
2.5 SOLUTION RESPIRATORY (INHALATION) EVERY 4 HOURS PRN
Status: DISCONTINUED | OUTPATIENT
Start: 2022-01-12 | End: 2022-01-12

## 2022-01-12 RX ORDER — DEXAMETHASONE 2 MG/1
2 TABLET ORAL 2 TIMES DAILY
Status: DISCONTINUED | OUTPATIENT
Start: 2022-01-12 | End: 2022-01-14

## 2022-01-12 NOTE — PROGRESS NOTES
Heme/Onc Progress Note    Patient Name: Trav Singleton   YOB: 1960   Medical Record Number: [id number]   CSN: [id number]   Attending Physician: Stephen Strong M.D. Subjective:  Much more awake and alert.   Has episode of A fib with RV sennosides (SENOKOT) tab 17.2 mg, 17.2 mg, Oral, BID  •  umeclidinium-vilanterol (ANORO ELLIPTA) 62.5-25 MCG/INH inhaler 1 puff, 1 puff, Inhalation, Daily  •  0.9% NaCl infusion, , Intravenous, Continuous  •  morphINE sulfate (PF) 2 MG/ML injection 1 mg, NM, PET/CT STANDARD BODY SCAN (ONCOLOGY) (CPT=78815), 3/08/2021, 12:13 PM.  JIE , CT, CT BRAIN OR HEAD (17749), 1/10/2022, 8:20 PM.       INDICATIONS:  follow up       TECHNIQUE:  Noncontrast CT scanning is performed through the brain.  Dose reduction te contrast administration, unenhanced multislice CT scanning is performed through the chest, abdomen, and pelvis.  Dose reduction techniques were used.  Dose information is transmitted to the ACR ([address] [place] [address] of Radiology)   [facility] Radiology Data study.  As seen, the appear to be relatively stable to the prior study.    ADRENALS:  No mass or enlargement.     AORTA:  No aneurysm or dissection.     RETROPERITONEUM:  The prior noted left para-aortic focus of retroperitoneal adenopathy has decreased in thickening of the bladder wall, incompletely evaluated on this noncontrast study.    1.       Dictated by (CST): Jeb Glover DO on 1/11/2022 at 2:43 PM       Finalized by (CST): Jeb Glover DO on 1/11/2022 at 3:06 PM              Impression and Plan:

## 2022-01-12 NOTE — CONSULTS
[address] 1937  [id number]  Hospital Day #2  Date of Consult: 01/12/22  Patient seen at: [facility]    Reason for Consultation:      Consult requested by Dr. Ward Douglas for evaluation of pal Care symptom needs assessed:      Symptoms(s): denies complaints  Dyspnea: denies  Cough: intermittent  Nausea: denies  Appetite: good   Pain: denies  Constipation: no  Last BM:   Bowel Movement       1/12/2022  1400             Stool Count Calculated for 600 mg, 600 mg, Oral, TID  HYDROcodone-acetaminophen (NORCO)  MG per tab 1-2 tablet, 1-2 tablet, Oral, Q6H PRN  mirtazapine (REMERON) tab 45 mg, 45 mg, Oral, Nightly  QUEtiapine (SEROQUEL) tab 100 mg, 100 mg, Oral, Nightly  sennosides (SENOKOT) tab 1 metastasis. A contrast-enhanced brain MRI is recommended for further assessment.    Dictated by (CST): Aren Camarena MD on 1/11/2022 at 7:17 AM     Finalized by (CST): Aren Camarena MD on 1/11/2022 at 7:23 AM       [address] [facility] ([id number]    Resul opacities obscure the majority of the prior noted subcentimeter sized pulmonary nodules. However the prior noted dominant 5 mm nodule within the periphery of the right upper lobe is again noted and is stable. 2. Mild bilateral pleural effusions.  3. The pr and effort on 2L NC  Neurologic: Alert and oriented to person, place and time    Psychiatric: Mood; anxious but clear headed  Skin: warm and dry.     Palliative Performance Scale : 40%    Palliative Performance Scale   % Ambulation Activity Level Self-Care mother. He was aware that there was no further cancer tx that would be offered/appropriate for him due to his disease progression and decreased performance status. We discussed the concern regarding trajectory of disease process and current wishes.  We HCPOA:  Document on file Yes [] No []. Requested for file [] Bozena Ferrera states he has at home.    Healthcare Agent Appointed: Yes  Healthcare Agent's Name: [id number] Agent's Phone Number: [phone]    Spiritual needs addressed:  Defer face contact and physical exam; >50% was spent counseling and coordinating care. Thank you for inviting Palliative Care Service to participate in the care of Radha Donnelly and family. We will continue to follow pending hospice transition.

## 2022-01-12 NOTE — PAYOR COMM NOTE
--------------  ADMISSION REVIEW     Payor: July REINOSO  Subscriber #:  [id number]  Authorization Number: PENDING    Admit date: 1/10/22  Admit time: 10:58 PM       REVIEW DOCUMENTATION:     ED Provider Notes      ED Provider Notes signed b cancer   • Muscle weakness    • Neuropathy    • Other abnormal glucose     has filter, blood clot in kidney   • Personal history of antineoplastic chemotherapy     had chemo 12/5/2021   • Problems with swallowing    • Visual impairment     glasses abdominal tenderness. Musculoskeletal:         General: Swelling present. No tenderness. Normal range of motion. Cervical back: Normal range of motion and neck supple. Right lower leg: Edema present. Left lower leg: Edema present.    Skin: components within normal limits   TROPONIN I HIGH SENSITIVITY - Normal   PROTHROMBIN TIME (PT) - Normal   LACTIC ACID, PLASMA - Normal   RAPID SARS-COV-2 BY PCR - Normal   RESPIRATORY FLU EXPAND PANEL + COVID-19 - Normal    Narrative:      This test is inte result                 Please view results for these tests on the individual orders.    PREPARE RBC   ABORH (BLOOD TYPE)   ANTIBODY SCREEN   BLOOD CULTURE   BLOOD CULTURE   URINE CULTURE, ROUTINE                   MDM           Patient was brought back to t Abnormal; Notable for the following components:    Procalcitonin 0.23 (*)     All other components within normal limits    Narrative:     Resulted by: batch: TNIH, K, CO2, CL, NA, MG, ALB, TBIL, ALT, AST, ALP, CA, CREA, BUN, GLUC,    CBC W/ DIFFERENTIAL - following orders were created for panel order Type and screen.   Procedure                               Abnormality         Status                     ---------                               -----------         ------                     ABORH (Blood Type) evidence for fracture or osseous abnormality.                               =====    CONCLUSION:      1. Punctate 3-4 mm high density focus within the left parietal cortex may     represent calcification versus hemorrhage, correlate clinically.          Fin Intravenous Stopped 1/10/22 1855)   sodium chloride 0.9% IV bolus 2,394 mL (0 mL Intravenous Stopped 1/10/22 1918)         Patient started on septic fluid bolus. Started on broad-spectrum antibiotics.   Work-up demonstrating findings suggestive of septic s secondary to UTI [facility]) A41.9, N39.0 1/10/2022 Unknown                         Signed by Pratima Cardoza MD on 1/10/2022  9:22 PM            H&P - H&P Note      H&P signed by Jeannie Tavares DO at 1/10/2022 11:27 PM     Author: Jeannie Tavares DO Service: [facility] • Former cigarette smoker    • High blood pressure    • Insomnia     Dr. Ulysses Groves   • Metastatic lung cancer (metastasis from lung to other site) [facility])     metastatic lung cancer   • Muscle weakness    • Neuropathy    • Other abnormal glucose     has filte 0  HYDROcodone-acetaminophen  MG Oral Tab, Take 1-2 tablets by mouth every 6 (six) hours as needed for Pain., Disp: 240 tablet, Rfl: 0  potassium chloride 10 MEQ Oral Tab CR, Take 1 tablet (10 mEq total) by mouth 2 (two) times daily. , Disp: 180 table to auscultation bilaterally. No wheezes. No rhonchi. Cardiovascular: S1, S2. Regular rate and rhythm. No murmurs, rubs or gallops. Equal pulses. Chest and Back: No tenderness or deformity. Abdomen: Soft, nontender, nondistended. Positive bowel sounds. given goals  - hold all blood thinning agents     # Atrial fibrillation  - Eliquis on hold    #COPD    # Essential hypertension  - all antihypertensives on hold     # CKD    # Anxiety, depression    # Chronic Anemia       I discussed with patient's mother Date Action Dose Route User    1/12/2022 0841 Given 600 mg Oral Mary Delgadillo, RN    1/11/2022 2015 Given 600 mg Oral Carol Clancy RN    1/11/2022 1732 Given 600 mg Oral Teresa Mccarty, RN      Gadoterate Meglumine (DOTAREM) 10 MMOL/20ML injection 30 Tarun Alejandro, RCP      umeclidinium-vilanterol (ANORO ELLIPTA) 62.5-25 MCG/INH inhaler 1 puff     Date Action Dose Route User    1/12/2022 6576 Given 1 puff Inhalation Chloé Alejandro RCP          Vitals (last day)     Date/Time Temp Puls anticoagulation stopped. Required indwelling pham for obstructive symptoms due to BPH. Started second line chemo with docetaxel on December 6th and had ramucirumab added on Decembrer 30th. On that date he was weaker and we established a DNAR status.   A measures only status        1)  Progressive weakness in a patient with stage IV squamous cell lung cancer - SP second line chemo with docetaxel and ramucirumab. Clearly weaker. May have UTI - has indwelling pham due to BPH and on empiric antibiotics.   H 138 143   K 3.5 3.7    110   CO2 27.0 24.0          Assessment & Plan:          # Septic Shock   - source appears to be urine   - Antibiotics  f/u urine and blood cultures     # Metastatic lung cancer with mets to bone and liver  - Consult Oncology a Hard to  extent of cancer in lungs due to this. Has some shrinkage of retroperitoneal nodes and renal mets. Has multiple lytic bone lesions. These are difficult to compare.   Also has a high density lesion in the vertex of the left side of the brain 01/11/22  0548 01/12/22  0800   * 75 99   BUN 33* 25* 13   CREATSERUM 1.83* 1.23 0.98   GFRAA 45* 73 96   GFRNAA 39* 63 83   CA 9.0 8.1* 7.8*   ALB 2.8*  --   --     143 143   K 3.5 3.7 3.8    110 113*   CO2 27.0 24.0 22.0   ALKPHO 70  -

## 2022-01-12 NOTE — PLAN OF CARE
Patient alert and oriented; vital signs stable; cardiac monitor showing atrial fibrillation, cardizem drip rate decreased to 5 mg/hour per titration for order; lung sounds diminished, on 3 liters of oxygen via nasal cannula, did desat to the about 85-88% o color and temperature  - Assess for signs of decreased coronary artery perfusion - ex.  Angina  - Evaluate fluid balance, assess for edema, trend weights  Outcome: Progressing  Goal: Absence of cardiac arrhythmias or at baseline  Description: INTERVENTIONS: preferences of patient/family/discharge partner  - Complete POLST form as appropriate  - Assess patient's ability to be responsible for managing their own health  - Refer to Case Management Department for coordinating discharge planning if the patient need

## 2022-01-12 NOTE — HOSPICE RN NOTE
Pt insurance is not in network with . Pt is on board with hospice and ready to go home. Pt will need to be re-referred. [employer] and [employer] are in network.

## 2022-01-12 NOTE — PROGRESS NOTES
Pt alert and oriented  Converted to AF at 0343 with HR sustaining 130-150's  Confirmed with EKG  Dr Carmina Bruno notified with orders    Stage 2 to sacral area noted and excoriation noted to L butt,Covered with mipelex.

## 2022-01-12 NOTE — PROGRESS NOTES
[facility]     Hospitalist Progress Note     Trav Singleton Patient Status:  Inpatient    10/22/1960 MRN [id number]   [facility] 2NE-A Attending Fredo Damon MD   Hosp Day # 2 PCP Ofe Rosa MD     Chief Complaint: gen Roslyn Barragan PCT 0.23*       Cardiac  No results for input(s): TROP, PBNP in the last 168 hours. Creatinine Kinase  No results for input(s): CK in the last 168 hours. Inflammatory Markers  No results for input(s): CRP, SHYAM, LDH, DDIMER in the last 168 hours. no  Estimated date of discharge: tbd  Discharge is dependent on: progress  At this point Mr. Mac Hernandez is expected to be discharge to: tbd, [facility] vs hospice

## 2022-01-12 NOTE — CONSULTS
[facility] Pulmonary, Critical Care and Sleep    Lobo Mclean Patient Status:  Inpatient    10/22/1960 MRN [id number]   Location 2609/260-A PCP Melvin Can MD     Date of Admission: 1/10/2022  History of Present Illness: Pt is a 62year old male con Rfl: 5  HYDROcodone-acetaminophen  MG Oral Tab, Take 1-2 tablets by mouth every 6 (six) hours as needed for Pain., Disp: 240 tablet, Rfl: 0  QUEtiapine 100 MG Oral Tab, Take 1 tablet (100 mg total) by mouth nightly., Disp: 90 tablet, Rfl: 3  metoprol 1-0.05 % External Cream, Apply 1 Application topically 2 (two) times daily as needed. , Disp: 45 g, Rfl: 3      Inpt meds:  • guaiFENesin ER  600 mg Oral BID   • metoprolol tartrate  100 mg Oral 2x Daily(Beta Blocker)   • Umeclidinium Bromide  1 puff Inhala 4. 9   HGB 7.3* 7.4* 8.5*   HCT 23.4* 24.6* 27.7*   .0 143.0* 129.0*     Recent Labs   Lab 01/10/22  1703 01/11/22  0548 01/12/22  0800   * 75 99   BUN 33* 25* 13   CREATSERUM 1.83* 1.23 0.98   GFRAA 45* 73 96   GFRNAA 39* 63 83   CA 9.0 8.1* thickening of the bladder wall, incompletely evaluated on this noncontrast study. Chest images personally reviewed. Assessment/Plan     1.  Dyspnea and hypoxia secondary to suspected infection with underlying metastatic lung cancer vs inflammato

## 2022-01-13 PROBLEM — C79.31 SECONDARY CANCER OF BRAIN (HCC): Status: ACTIVE | Noted: 2022-01-13

## 2022-01-13 LAB
ANION GAP SERPL CALC-SCNC: 6 MMOL/L (ref 0–18)
BASOPHILS # BLD AUTO: 0 X10(3) UL (ref 0–0.2)
BASOPHILS NFR BLD AUTO: 0 %
BUN BLD-MCNC: 14 MG/DL (ref 7–18)
C DIFF TOX B STL QL: NEGATIVE
CALCIUM BLD-MCNC: 7.6 MG/DL (ref 8.5–10.1)
CHLORIDE SERPL-SCNC: 114 MMOL/L (ref 98–112)
CO2 SERPL-SCNC: 22 MMOL/L (ref 21–32)
CREAT BLD-MCNC: 0.7 MG/DL
EOSINOPHIL # BLD AUTO: 0 X10(3) UL (ref 0–0.7)
EOSINOPHIL NFR BLD AUTO: 0 %
ERYTHROCYTE [DISTWIDTH] IN BLOOD BY AUTOMATED COUNT: 21.7 %
GLUCOSE BLD-MCNC: 137 MG/DL (ref 70–99)
HCT VFR BLD AUTO: 27.1 %
HGB BLD-MCNC: 8.2 G/DL
IMM GRANULOCYTES # BLD AUTO: 0.1 X10(3) UL (ref 0–1)
IMM GRANULOCYTES NFR BLD: 3.5 %
LYMPHOCYTES # BLD AUTO: 0.51 X10(3) UL (ref 1–4)
LYMPHOCYTES NFR BLD AUTO: 18 %
MCH RBC QN AUTO: 28.2 PG (ref 26–34)
MCHC RBC AUTO-ENTMCNC: 30.3 G/DL (ref 31–37)
MCV RBC AUTO: 93.1 FL
MONOCYTES # BLD AUTO: 0.08 X10(3) UL (ref 0.1–1)
MONOCYTES NFR BLD AUTO: 2.8 %
NEUTROPHILS # BLD AUTO: 2.15 X10 (3) UL (ref 1.5–7.7)
NEUTROPHILS # BLD AUTO: 2.15 X10(3) UL (ref 1.5–7.7)
NEUTROPHILS NFR BLD AUTO: 75.7 %
OSMOLALITY SERPL CALC.SUM OF ELEC: 297 MOSM/KG (ref 275–295)
PLATELET # BLD AUTO: 122 10(3)UL (ref 150–450)
POTASSIUM SERPL-SCNC: 4.9 MMOL/L (ref 3.5–5.1)
POTASSIUM SERPL-SCNC: 4.9 MMOL/L (ref 3.5–5.1)
RBC # BLD AUTO: 2.91 X10(6)UL
SODIUM SERPL-SCNC: 142 MMOL/L (ref 136–145)
WBC # BLD AUTO: 2.8 X10(3) UL (ref 4–11)

## 2022-01-13 PROCEDURE — 99232 SBSQ HOSP IP/OBS MODERATE 35: CPT | Performed by: INTERNAL MEDICINE

## 2022-01-13 NOTE — CM/SW NOTE
SW received call from [facility] that [facility] is not in network w/ pt's insurance. Re-routed referral to [facility] [place], and [facility]. Called Sarmad Patiño at [facility] to verify if they are in network [employer]  Messages sent in Aidin to all age

## 2022-01-13 NOTE — PROGRESS NOTES
[facility]     Hospitalist Progress Note     Trinh Dobson Patient Status:  Inpatient    10/22/1960 MRN [id number]   [facility] 2NE-A Attending Bear Ledesma MD   Hosp Day # 3 PCP Cecilio Vivar MD     Chief Complaint: gen 01/10/22  1703   PTP 13.5   INR 1.03            COVID-19 Lab Results    COVID-19  Lab Results   Component Value Date    COVID19 Not Detected 01/12/2022    COVID19 Not Detected 01/10/2022    COVID19 Not Detected 01/10/2022       Pro-Calcitonin  Recent Labs monhaRDT, NP     Supplementary Documentation:     Quality:  · DVT Prophylaxis: SCD  · CODE status: DNR/C  · Tirado:  Yes   · Central line: none  · If COVID testing is negative, may discontinue isolation: yes     Will the patient be referred to [facility] on discha

## 2022-01-13 NOTE — PLAN OF CARE
Problem: GENITOURINARY - ADULT  Tirado catheter in place for urinary retention  BPH  Enlarged scrotum  Goal: Absence of urinary retention  Description: INTERVENTIONS:  - Assess patient’s ability to void and empty bladder  - Monitor intake/output and perfo

## 2022-01-13 NOTE — CM/SW NOTE
Shelley Has from [facility] at bedside to complete hospice intake paperwork. Per Noa Distance to arrange for transport home on Friday.      GYPSY Silva, [facility]   / Discharge Planner  [id number]

## 2022-01-13 NOTE — PLAN OF CARE
Problem: PAIN - ADULT  Denies pain, complains of generalized soreness with turning and repositioning, Scheduled  Morphine PO  Hospice consult in progress  Goal: Verbalizes/displays adequate comfort level or patient's stated pain goal  Description: INTERV measures for life threatening arrhythmias  - Monitor electrolytes and administer replacement therapy as ordered  Outcome: Progressing     Problem: RISK FOR INFECTION - ADULT  Afebrile  IV antibiotic / Ceftriaxone daily for possible PNA  C-diff culture sent their own health  - Refer to Case Management Department for coordinating discharge planning if the patient needs post-hospital services based on physician/LIP order or complex needs related to functional status, cognitive ability or social support system

## 2022-01-13 NOTE — CM/SW NOTE
JACOB spoke with Wesly Clark at [facility]. She is going to come to hospital to work on hospice consents w/ pt this afternoon. JACOB notified Enzo Garcia, charge RN and RN.  Spoke with pt's mother, Ayo Rueda, they are coordinating delivery with Ayo Rueda for del

## 2022-01-13 NOTE — PROGRESS NOTES
[facility]    - chart reviewed. Pt has transitioned to hospice care. Will sign off. Please call with questions.     Marta Salazar MD

## 2022-01-13 NOTE — PHYSICAL THERAPY NOTE
PT order placed by RN on 1/12, chart reviewed. POC has progressed with plans for home with hospice. Will dc from acute PT as not consistent with end of life care.   Will defer further equipment recommendations to hospice team.  Please re-consult if POC ch

## 2022-01-13 NOTE — OCCUPATIONAL THERAPY NOTE
OT order received, chart reviewed. POC has progressed with plans for home with hospice. Will dc from IP OT as not consistent with end of life care.  Will defer further equipment recommendations to hospice team. Please re-consult if POC changes and pt has po

## 2022-01-13 NOTE — PROGRESS NOTES
Heme/Onc Progress Note    Patient Name: Jerman Wasserman   YOB: 1960   Medical Record Number: [id number]   CSN: [id number]   Attending Physician: Edy Quiñones M.D. Subjective:  Feels a little better.   Hasn't been out of bed - per [employer] BID  •  umeclidinium-vilanterol (ANORO ELLIPTA) 62.5-25 MCG/INH inhaler 1 puff, 1 puff, Inhalation, Daily  •  0.9% NaCl infusion, , Intravenous, Continuous  •  morphINE sulfate (PF) 2 MG/ML injection 1 mg, 1 mg, Intravenous, Q2H PRN **OR** morphINE sulfate 4.9 4.0 - 11.0 x10(3) uL    RBC 2.99 (L) 4.30 - 5.70 x10(6)uL    HGB 8.5 (L) 13.0 - 17.5 g/dL    HCT 27.7 (L) 39.0 - 53.0 %    .0 (L) 150.0 - 450.0 10(3)uL    MCV 92.6 80.0 - 100.0 fL    MCH 28.4 26.0 - 34.0 pg    MCHC 30.7 (L) 31.0 - 37.0 g/dL    R Not Detected    Bordetella Pertussis PCR Not Detected Not Detected    Bordetella Parapertussis PCR Not Detected Not Detected    Chlamydia pneumonia PCR: Not Detected Not Detected    Mycoplasma pneumonia PCR: Not Detected Not Detected   Basic Metabolic Pane  lesion near left vertex on CT       TECHNIQUE:  MRI of the brain was performed with multi-planar T1, T2-weighted images with FLAIR sequences and diffusion weighted images without and with infusion.       PATIENT STATED HISTORY:(As transcribed by [employer] measuring approximately 0.7 x 0.8 cm with minimal surrounding vasogenic edema.       3.  Minimal chronic microvascular ischemic changes in the cerebral white matter.       Please see above for further details.           Dictated by (CST): Khoa Barr MD happy to remain physician of record while he enters hospice.    6)  Anemia - severe - transfuse one unit if less than 7. Iron adequate. Recheck CBC.    7)  Afib with RVR - per primary service.     In my judgement one more night here is reasonable.

## 2022-01-14 VITALS
HEART RATE: 71 BPM | BODY MASS INDEX: 27.42 KG/M2 | SYSTOLIC BLOOD PRESSURE: 115 MMHG | DIASTOLIC BLOOD PRESSURE: 66 MMHG | TEMPERATURE: 98 F | RESPIRATION RATE: 20 BRPM | HEIGHT: 66 IN | OXYGEN SATURATION: 95 % | WEIGHT: 170.63 LBS

## 2022-01-14 PROCEDURE — 99232 SBSQ HOSP IP/OBS MODERATE 35: CPT | Performed by: INTERNAL MEDICINE

## 2022-01-14 PROCEDURE — 99239 HOSP IP/OBS DSCHRG MGMT >30: CPT | Performed by: INTERNAL MEDICINE

## 2022-01-14 NOTE — CM/SW NOTE
01/14/22 1011   Discharge disposition   Expected discharge disposition Home or Self   Post Acute Care Provider  [facility]   Additional Home Care/Hospice Provider [facility]  ([address])   Discharge transportation [address] [facility]

## 2022-01-14 NOTE — PROGRESS NOTES
Heme/Onc Progress Note    Patient Name: Kevin Rosa   YOB: 1960   Medical Record Number: [id number]   CSN: [id number]   Attending Physician: Adamaris Hahn M.D. Subjective: Had diarrhea yesterday - C Diff neg.   Plans in place to sennosides (SENOKOT) tab 17.2 mg, 17.2 mg, Oral, BID  •  umeclidinium-vilanterol (ANORO ELLIPTA) 62.5-25 MCG/INH inhaler 1 puff, 1 puff, Inhalation, Daily  •  morphINE sulfate (PF) 2 MG/ML injection 1 mg, 1 mg, Intravenous, Q2H PRN **OR** morphINE sulfate in multiple lobes.  Hard to  extent of cancer in lungs due to this.  Has some shrinkage of retroperitoneal nodes and renal mets.  Has multiple lytic bone lesions.  These are difficult to compare.   Also has a high density lesion in the vertex of the le

## 2022-01-14 NOTE — PLAN OF CARE
Received patient this morning alert and oriented x 4. Resting in bed. Plan of care updated with patient. Home today , ambulance  for today at 1 pm.  Denies any complaints. Social Work assisting with discharge planning.

## 2022-01-14 NOTE — PROGRESS NOTES
[facility]     Hospitalist Progress Note     David Michelle Patient Status:  Inpatient    10/22/1960 MRN [id number]   [facility] 2NE-A Attending Evelyne Hannon MD   Hosp Day # 4 PCP Boubacar Sanderson MD     Chief Complaint: gen Lab 01/10/22  1703   PTP 13.5   INR 1.03            COVID-19 Lab Results    COVID-19  Lab Results   Component Value Date    COVID19 Not Detected 01/12/2022    COVID19 Not Detected 01/10/2022    COVID19 Not Detected 01/10/2022       Pro-Calcitonin  Recent · Central line: none  · If COVID testing is negative, may discontinue isolation: yes     Will the patient be referred to TCC on discharge?: no  Estimated date of discharge: tday  Discharge is dependent on: progress  At this point Mr. Ayden Cheatham is expect

## 2022-01-14 NOTE — PLAN OF CARE
Problem: PAIN - ADULT  Goal: Verbalizes/displays adequate comfort level or patient's stated pain goal  Description: INTERVENTIONS:  - Encourage pt to monitor pain and request assistance  - Assess pain using appropriate pain scale  - Administer analgesics INTERVENTIONS  - Monitor WBC  - Administer growth factors as ordered  - Implement neutropenic guidelines  Outcome: Adequate for Discharge     Problem: SAFETY ADULT - FALL  Goal: Free from fall injury  Description: INTERVENTIONS:  - Assess pt frequently for strategies  - Use visual cues when possible  - Listen attentively, be patient, do not interrupt  - Minimize distractions  - Allow time for understanding and response  - Establish method for patient to ask for assistance (call light)  - Provide an interpret assistive devices  - Assist with transfers and ambulation using safe patient handling equipment as needed  - Ensure adequate protection for wounds/incisions during mobilization  - Obtain PT/OT consults as needed  - Advance activity as appropriate  - Commun

## 2022-01-14 NOTE — PLAN OF CARE
Assumed care of patient around 1. Alert and oriented x4. Denies any chest pain, lightheadedness or shortness of breath. On 2L NC, O2 sat 95%. NSR on tele. Voids per pham, good output. Fall precautions in place, bed alarm on. Call light within reach. on assessment  - Modify environment to reduce risk of injury  - Provide assistive devices as appropriate  - Consider OT/PT consult to assist with strengthening/mobility  - Encourage toileting schedule  Outcome: Progressing     Problem: DISCHARGE PLANNING patient's medication profile  - Implement strategies to promote bladder emptying  Outcome: Progressing

## 2022-01-15 NOTE — DISCHARGE SUMMARY
[facility] [place] HOSPITALIST  DISCHARGE SUMMARY     Palma Jenkins Patient Status:  Inpatient    10/22/1960 MRN [id number]   [facility] 2NE-A Attending No att. providers found   Hosp Day # 4 PCP Roxana Phelps MD     Date of Admission: 1/10/2022  D unfortunately for him.-Infectious work-up likely with possible pneumonia hard to tell due to his cancer. Urine likely contaminated chronic Tirado. .  Patient has met with [address] will be going home not on any anticoagulation or antibiotic pain medici adenopathy. 4. Interval development of new lytic metastases of the thoracolumbar spine as described above.  No evidence of acute pathologic fractures.    5. Bilateral renal masses are suboptimally evaluated on this noncontrast exam. Dieudonne Pool as seen on th stopped  • Likely will  need will additional help at home as his elderly mother is going to try to provide care  • No anticoagulation  • Negative for COVID, respiratory viral panel negative, blood cultures negative urine culture was probable contamination by mouth nightly. Quantity: 90 tablet  Refills: 3     morphINE ER 30 MG Tbcr  Commonly known as: MS CONTIN      Take 1 tablet (30 mg total) by mouth every 8 (eight) hours.    Quantity: 90 tablet  Refills: 0     ondansetron 8 MG tablet  Commonly known as: [facility] A. Once you arrive, please register at the [address] [place] [address] on the second floor.   **Important Info for [employer]** Since the safety and protection of patients, staff, physicians and community is an ab afterward, unless patient assistance is required. -Staff will be available to escort patients inside as needed. Family/friends will be notified when your loved one is ready for pick-up. Thank you for your understanding.                     Vital signs:  Te

## 2022-01-17 ENCOUNTER — PATIENT OUTREACH (OUTPATIENT)
Dept: CASE MANAGEMENT | Age: 62
End: 2022-01-17

## 2022-01-17 NOTE — PROGRESS NOTES
Initial Post Discharge Follow Up   Discharge Date: 1/14/22  Contact Date: 1/17/2022    Consent Verification:  Assessment Completed With: Caregiver: Zheng Cox received per patient?  written  HIPAA Verified?   Yes    Discharge Dx:     Septic shoc

## 2022-01-17 NOTE — PAYOR COMM NOTE
PLEASE FAX DETERMINATION    DISCHARGE REVIEW    Payor: [employer]  Subscriber #:  [id number]  Authorization Number: PENDING    Admit date: 1/10/22  Admit time:  10:58 PM  Discharge Date: 1/14/2022  1:39 PM     Admitting Physician: Ward Douglas, regularly over the last couple of weeks, he has not been eating much, he has chronic pain related to his metastatic disease and has been using oral morphine. He was brought to the emergency department due to fatigue and weakness.   He was profoundly hypote prior PET-CT.     Given the clinical history of malignancy, these findings are concerning for a punctate hemorrhagic metastasis.  A contrast-enhanced brain MRI is recommended for further assessment. CT chest abdomen      CONCLUSION:     1.  Interval de intracranial pathology.     1. No acute intracranial abnormality identified.     2.  The questioned abnormality on recent CT corresponds with a necrotic enhancing metastatic lesion along the paramedian left parietal lobe measuring up to 1.2 x 1.1 cm with mi chemo   Quantity: 12 tablet  Refills: 11     Dutasteride 0.5 MG Caps  Commonly known as: AVODART      Take 1 capsule (0.5 mg total) by mouth daily.    Quantity: 90 capsule  Refills: 3     gabapentin 300 MG Caps  Commonly known as: NEURONTIN      Take 2 caps Tabs  Commonly known as: BUMEX               [place] reviewed[place]ed    Follow-up appointment:   Ranulfo Barron MD  [address] [address]  [address] [facility] [phone]      As needed    Appointments for Next 30 Days 1/14/2022 - 2/13/2022 [address]. Please park in the [address], [address] and enter through the [address] [facility].. Once you arrive, please register at the [address] [facility] on the second floor.   **Important Info for [employer] [employer]* 2:04 PM         REVIEWER COMMENTS

## 2022-01-20 ENCOUNTER — OFFICE VISIT (OUTPATIENT)
Dept: HEMATOLOGY/ONCOLOGY | Age: 62
End: 2022-01-20
Attending: INTERNAL MEDICINE
Payer: COMMERCIAL

## 2022-01-20 ENCOUNTER — APPOINTMENT (OUTPATIENT)
Dept: HEMATOLOGY/ONCOLOGY | Age: 62
End: 2022-01-20
Attending: INTERNAL MEDICINE
Payer: COMMERCIAL

## 2022-01-20 DIAGNOSIS — C34.92 PRIMARY MALIGNANT NEOPLASM OF LEFT LUNG METASTATIC TO OTHER SITE (HCC): Primary | ICD-10-CM

## 2022-01-20 DIAGNOSIS — C79.51 SECONDARY CANCER OF BONE (HCC): ICD-10-CM

## 2023-02-28 NOTE — TELEPHONE ENCOUNTER
Health Maintenance Due   Topic Date Due   • Pneumococcal Vaccine 65+ (2 - PPSV23 if available, else PCV20) 12/04/2008   • Colorectal Cancer Screen-  02/23/2022   • Osteoporosis Screening  Never done   • COVID-19 Vaccine (5 - Booster for Pfizer series) 10/13/2022       Patient is due for topics as listed above but is not proceeding with Immunization(s) COVID-19 and Pneumococcal, Colorectal Cancer Screening: Colonoscopy and Osteoporosis screening at this time.    Per pt has blood in urine. Please call thank you.

## 2023-07-17 ENCOUNTER — NURSE ONLY (OUTPATIENT)
Dept: HEMATOLOGY/ONCOLOGY | Facility: HOSPITAL | Age: 63
End: 2023-07-17

## (undated) NOTE — LETTER
01/15/20        Christa [id number]      Dear Dora Houston,    [address] [facility] records indicate that you have outstanding lab work and or testing that was ordered for you and has not yet been completed: Fasting lab work   *fast for at

## (undated) NOTE — LETTER
12/09/20        Christa [id number]      Dear Michael Gatica,    [address] [facility] records indicate that you have outstanding lab work and or testing that was ordered for you and has not yet been completed:  Orders Placed This Encounter

## (undated) NOTE — LETTER
Date: 12/29/2020    Patient Name: Sheryl Mcgee          To Whom it may concern: The above patient was seen at the [facility] for treatment of a medical condition.     This patient should be excused from attending work until further eval

## (undated) NOTE — LETTER
07/25/18        Christa [address]      Dear Justine Smith,    [address] [facility] records indicate that you have outstanding lab work and or testing that was ordered for you and has not yet been completed:          Hemoglobin A1C      Jose L Musa

## (undated) NOTE — LETTER
FAX REGARDING HISTORY AND PHYSICALS  Patient Name: Juan Diego Rowe CSN: [id number] MRN: [id number]     CT BIOPSY RENAL   Consent Procedure:   Anesthesia Type: MAC    The patient listed above is coming in for a surgical/procedural case.   An H&P is needed wit

## (undated) NOTE — IP AVS SNAPSHOT
[address]            (For Outpatient Use Only) Initial Admit Date: 1/10/2022   Inpt/Obs Admit Date: Inpt: 1/10/22 / Obs: N/A   Discharge Date:    Tessy Jay:  [de-identified]   MRN: [de-identified]   CSN: [id number]   CEID: [id number] Name:  Gaby Maldonado :    Subscriber ID:  Pt Rel to Subscriber:    Hospital Account Financial Class: Managed Care    2022

## (undated) NOTE — IP AVS SNAPSHOT
Patient Demographics     Address  [address] [facility] [address] Phone  [phone] [facility])  [phone] (Mobile) *Preferred* E-mail Address  [email or web address]      Emergency Contact(s)     Name Relation Home Work [address] [place] [address] known as: NEURONTIN  Next dose due: 1/14/2022 2 pm      Take 2 capsules (600 mg total) by mouth 3 (three) times daily.    Jak Galvan MD         HYDROcodone-acetaminophen  MG Tabs  Commonly known as: NORCO      Take 1-2 tablets by mouth every 6 HYDROcodone-acetaminophen (NORCO)  MG per tab 1-2 tablet 01/14/22 0635 Given      [id number] QUEtiapine (SEROQUEL) tab 100 mg 01/13/22 2105 Given      [id number] albuterol (VENTOLIN) (2.5 MG/3ML) 0.083% nebulizer solution 2.5 mg 01/13/22 1952 Given (All)     Procedure Component Value Units Date/Time    Blood Culture [[id number]] Collected: 01/10/22 1703    Order Status: Completed Lab Status: Preliminary result Updated: 01/13/22 1800    Specimen: Blood,peripheral      Blood Culture Result No Growth 3 D of respiratory viral infection consistent with COVID-19 by their healthcare provider. Test performed using the [employer] Connect Respiratory Panel 2.1 (RP2.1) assay on the [employer] 2.0 System, [facility], [address] [place] [address].     This test is b Syndrome Coronavirus 2 (SARS-CoV-2) in nasopharyngeal swab from individuals suspected of respiratory viral infection consistent with COVID-19 by their healthcare provider.     Test performed using the [employer] Respiratory Panel 2.1 (RP2.1) assay on the [facility] over the last couple of weeks, he has not been eating much, he has chronic pain related to his metastatic disease and has been using oral morphine. He was brought to the emergency department due to fatigue and weakness.   He was profoundly hypotensive on a Allergies:   Pcn [Penicillins]           Comment:Allergy as a child    Medications:  No current facility-administered medications on file prior to encounter.   prochlorperazine (COMPAZINE) 10 mg tablet, Take 1 tablet (10 mg total) by mouth every 6 (si 1 tablet (45 mg total) by mouth nightly., Disp: 90 tablet, Rfl: 3  ALBUTEROL SULFATE  (90 Base) MCG/ACT Inhalation Aero Soln, INHALE 2 PUFFS INTO THE LUNGS EVERY 4 HOURS AS NEEDED FOR WHEEZING OR SHORTNESS OF BREATH, Disp: 8.5 g, Rfl: 0  clotrimazol Detected 12/07/2021       Pro-Calcitonin  Recent Labs   Lab 01/10/22  1703   PCT 0.23*       Cardiac  No results for input(s): TROP, PBNP in the last 168 hours. Creatinine Kinase  No results for input(s): CK in the last 168 hours.     Inflammatory Marker PM Status: Signed    : Natalie Graham DO (Physician)    Related Notes: Addendum by Natalie Graham DO (Physician) filed at 1/10/2022 11:27 PM         [facility]  History and Physical     [place] Patient Status:  Inpatient    10/22/ Problems with swallowing    • Visual impairment     glasses        Past Surgical History:   Past Surgical History:   Procedure Laterality Date   • HERNIA SURGERY  1990    Hernia repair    • TONSILLECTOMY      as a child       Social History:  reports that tartrate 100 MG Oral Tab, Take 1 tablet (100 mg total) by mouth 2 (two) times daily. , Disp: 180 tablet, Rfl: 0  Senna 8.6 MG Oral Tab, Take 17.2 mg by mouth 2 (two) times daily. , Disp: , Rfl:   polyethylene glycol, PEG 3350, 17 g Oral Powd Pack, Take 17 g intact. Musculoskeletal: Moves all extremities. Extremities: No edema or cyanosis. Integument: No rashes or lesions.    Psychiatric: unable to assess      Diagnostic Data:      Labs:  Recent Labs   Lab 01/10/22  1703   WBC 3.5*   HGB 7.3*   MCV 89.7   PL start Morphine gtt for comfort. She would like to discuss Palliative options.  She is okay with antibiotics and IVF.       Quality:  · DVT Prophylaxis: SCDs  · CODE status: DNAR/Comfort  · Tirado: yes  · If COVID testing is negative, may discontinue isolatio diagnostic radiation     March   • Former cigarette smoker    • High blood pressure    • Insomnia     Dr. Aleksandra Melchor   • Metastatic lung cancer (metastasis from lung to other site) [facility])     metastatic lung cancer   • Muscle weakness    • Neuropathy    • Other Tab CR, Take 1 tablet (30 mg total) by mouth every 8 (eight) hours. , Disp: 90 tablet, Rfl: 0  potassium chloride 10 MEQ Oral Tab CR, Take 1 tablet (10 mEq total) by mouth 2 (two) times daily. , Disp: 180 tablet, Rfl: 1  Senna 8.6 MG Oral Tab, Take 17.2 mg b Grandparents,Family history of CAD    • Other (Blood Clots) Other         Aunt, Family history of problem with boold clots       REVIEW OF SYSTEMS:   A comprehensive 10 point review of systems was completed.   Pertinent positives and negatives noted 1/11/22:    1.  Interval development of a mild to moderate degree of patchy ground-glass opacities of the lungs, concerning for potential COVID-19 pneumonia.  The opacities obscure the majority of the prior noted subcentimeter sized pulmonary nodules. [facility] 1/13/2022  9:37 AM  Version 1 of 1    Author: Teresa Elise PT Service: Rehab Author Type: Physical Therapist    Filed: 1/13/2022  9:37 AM Date of Service: 1/13/2022  9:36 AM Status: Signed    : Teresa Elise PT (Physical Therapist)       PT or Term Goal: \"go home\"    Interventions:  - daily labs  - oncology to see  - palliative care  - See additional Care Plan goals for specific interventions   Patient/Family Short Term Goal     Interdisciplinary Adequate for Discharge    Description: Patient'